# Patient Record
Sex: MALE | Race: WHITE | NOT HISPANIC OR LATINO | Employment: FULL TIME | URBAN - NONMETROPOLITAN AREA
[De-identification: names, ages, dates, MRNs, and addresses within clinical notes are randomized per-mention and may not be internally consistent; named-entity substitution may affect disease eponyms.]

---

## 2017-04-03 ENCOUNTER — OFFICE VISIT (OUTPATIENT)
Dept: URGENT CARE | Facility: PHYSICIAN GROUP | Age: 47
End: 2017-04-03
Payer: MEDICAID

## 2017-04-03 VITALS
HEART RATE: 88 BPM | HEIGHT: 72 IN | WEIGHT: 228 LBS | RESPIRATION RATE: 20 BRPM | TEMPERATURE: 98.8 F | SYSTOLIC BLOOD PRESSURE: 98 MMHG | BODY MASS INDEX: 30.88 KG/M2 | OXYGEN SATURATION: 93 % | DIASTOLIC BLOOD PRESSURE: 54 MMHG

## 2017-04-03 DIAGNOSIS — R06.02 SOB (SHORTNESS OF BREATH): ICD-10-CM

## 2017-04-03 DIAGNOSIS — J31.0 OTHER RHINITIS: ICD-10-CM

## 2017-04-03 DIAGNOSIS — J44.1 COPD EXACERBATION (HCC): ICD-10-CM

## 2017-04-03 DIAGNOSIS — J06.9 URI WITH COUGH AND CONGESTION: ICD-10-CM

## 2017-04-03 PROCEDURE — 99214 OFFICE O/P EST MOD 30 MIN: CPT | Performed by: PHYSICIAN ASSISTANT

## 2017-04-03 RX ORDER — DOXYCYCLINE HYCLATE 100 MG
100 TABLET ORAL 2 TIMES DAILY
Qty: 20 TAB | Refills: 0 | Status: SHIPPED | OUTPATIENT
Start: 2017-04-03 | End: 2017-05-09

## 2017-04-03 RX ORDER — METHYLPREDNISOLONE 4 MG/1
TABLET ORAL
Qty: 21 TAB | Refills: 0 | Status: SHIPPED | OUTPATIENT
Start: 2017-04-03 | End: 2017-05-22

## 2017-04-03 RX ORDER — ALBUTEROL SULFATE 2.5 MG/3ML
2.5 SOLUTION RESPIRATORY (INHALATION) EVERY 4 HOURS PRN
Qty: 75 ML | Refills: 0 | Status: SHIPPED | OUTPATIENT
Start: 2017-04-03 | End: 2017-05-09

## 2017-04-03 RX ORDER — CODEINE PHOSPHATE AND GUAIFENESIN 10; 100 MG/5ML; MG/5ML
5 SOLUTION ORAL NIGHTLY PRN
Qty: 120 ML | Refills: 0 | Status: SHIPPED | OUTPATIENT
Start: 2017-04-03 | End: 2017-05-09

## 2017-04-03 NOTE — MR AVS SNAPSHOT
"        Dayton Luna Sonya   4/3/2017 11:25 AM   Office Visit   MRN: [id number]    Department:  [facility]   Dept Phone:  [phone]    Description:  Male : 1970   Provider:  Maria Fernanda Gandara PA-C           Reason for Visit     Sore Throat fever, headache, cough, congestion X 1 month      Allergies as of 4/3/2017     No Known Allergies      You were diagnosed with     URI with cough and congestion   [5150760]       COPD exacerbation (CMS-[facility])   [[id number]]       SOB (shortness of breath)   [236603]         Vital Signs     Blood Pressure Pulse Temperature Respirations Height Weight    98/54 mmHg 88 37.1 °C (98.8 °F) 20 1.829 m (6' 0.01\") 103.42 kg (228 lb)    Body Mass Index Oxygen Saturation Smoking Status             30.92 kg/m2 93% Current Every Day Smoker         Basic Information     Date Of Birth Sex Race Ethnicity Preferred Language    1970 Male White Non- English      Health Maintenance        Date Due Completion Dates    IMM DTaP/Tdap/Td Vaccine (1 - Tdap) 1987 ---            Current Immunizations     No immunizations on file.      Below and/or attached are the medications your provider expects you to take. Review all of your home medications and newly ordered medications with your provider and/or pharmacist. Follow medication instructions as directed by your provider and/or pharmacist. Please keep your medication list with you and share with your provider. Update the information when medications are discontinued, doses are changed, or new medications (including over-the-counter products) are added; and carry medication information at all times in the event of emergency situations     Allergies:  No Known Allergies          Medications  Valid as of: 2017 - 12:01 PM    Generic Name Brand Name Tablet Size Instructions for use    Albuterol Sulfate (Nebu Soln) PROVENTIL 2.5mg/3ml 3 mL by Nebulization route every four hours as needed for Shortness of Breath.       " Albuterol Sulfate (Aero Soln) albuterol 108 (90 BASE) MCG/ACT Inhale 2 Puffs by mouth every four hours as needed for Shortness of Breath.        Albuterol Sulfate (Nebu Soln) PROVENTIL 2.5mg/3ml 3 mL by Nebulization route every four hours as needed for Shortness of Breath.        Albuterol Sulfate (Aero Soln) albuterol 108 (90 BASE) MCG/ACT Inhale 2 Puffs by mouth every 6 hours as needed for Shortness of Breath.        Albuterol Sulfate (Nebu Soln) PROVENTIL 2.5mg/3ml 3 mL by Nebulization route every four hours as needed for Shortness of Breath.        Doxycycline Hyclate (Tab) VIBRAMYCIN 100 MG Take 1 Tab by mouth 2 times a day.        Fluticasone Propionate (Suspension) FLONASE 50 MCG/ACT Spray 1 Spray in nose 2 times a day.        Guaifenesin-Codeine (Solution) ROBITUSSIN -10 mg/5mL Take 5 mL by mouth at bedtime as needed for Cough.        Guaifenesin-Codeine (Solution) ROBITUSSIN -10 mg/5mL Take 5 mL by mouth at bedtime as needed for Cough.        Ipratropium Bromide (Solution) ATROVENT 0.02 % 1 mL by Nebulization route 2 times a day.        MethylPREDNISolone (Tablet Therapy Pack) MEDROL DOSEPAK 4 MG Use as package directs        .                 Medicines prescribed today were sent to:     [facility] [id number] [place] [address] [address] [place] [address] AT [facility] [address]    [address] [facility] [address]    Phone: [phone] Fax: [phone]    Open 24 Hours?: No      Medication refill instructions:       If your prescription bottle indicates you have medication refills left, it is not necessary to call your provider’s office. Please contact your pharmacy and they will refill your medication.    If your prescription bottle indicates you do not have any refills left, you may request refills at any time through one of the following ways: The online [employer] system (except Urgent Care), by calling your provider’s office, or by asking your pharmacy to contact your provider’s  office with a refill request. Medication refills are processed only during regular business hours and may not be available until the next business day. Your provider may request additional information or to have a follow-up visit with you prior to refilling your medication.   *Please Note: Medication refills are assigned a new Rx number when refilled electronically. Your pharmacy may indicate that no refills were authorized even though a new prescription for the same medication is available at the pharmacy. Please request the medicine by name with the pharmacy before contacting your provider for a refill.        Instructions    Call [facility]  [phone]          IT MOVES IT Access Code: [email or web address]  Expires: 5/3/2017 12:01 PM    Your email address is not on file at [facility].  Email Addresses are required for you to sign up for IT MOVES IT, please contact [phone] to verify your personal information and to provide your email address prior to attempting to register for IT MOVES IT.    [place] C[place] [address] [address]    IT MOVES IT  A secure, online tool to manage your health information     [facility]’s IT MOVES IT® is a secure, online tool that connects you to your personalized health information from the privacy of your home -- day or night - making it very easy for you to manage your healthcare. Once the activation process is completed, you can even access your medical information using the IT MOVES IT chris, which is available for free in the Apple Chris store or Google Play store.     To learn more about IT MOVES IT, visit [email or web address]    There are two levels of access available (as shown below):   My Chart Features  [facility] Primary Care Doctor [facility]  Specialists [facility]  Urgent  Care Non-[facility] Primary Care Doctor   Email your healthcare team securely and privately 24/7 X X X    Manage appointments: schedule your next appointment; view details of past/upcoming  appointments X      Request prescription refills. X      View recent personal medical records, including lab and immunizations X X X X   View health record, including health history, allergies, medications X X X X   Read reports about your outpatient visits, procedures, consult and ER notes X X X X   See your discharge summary, which is a recap of your hospital and/or ER visit that includes your diagnosis, lab results, and care plan X X  X     How to register for [employer]:  Once your e-mail address has been verified, follow the following steps to sign up for [employer].     1. Go to  [email or web address]  2. Click on the Sign Up Now box, which takes you to the New Member Sign Up page. You will need to provide the following information:  a. Enter your [employer] Access Code exactly as it appears at the top of this page. (You will not need to use this code after you’ve completed the sign-up process. If you do not sign up before the expiration date, you must request a new code.)   b. Enter your date of birth.   c. Enter your home email address.   d. Click Submit, and follow the next screen’s instructions.  3. Create a [employer] ID. This will be your [employer] login ID and cannot be changed, so think of one that is secure and easy to remember.  4. Create a [employer] password. You can change your password at any time.  5. Enter your Password Reset Question and Answer. This can be used at a later time if you forget your password.   6. Enter your e-mail address. This allows you to receive e-mail notifications when new information is available in [employer].  7. Click Sign Up. You can now view your health information.    For assistance activating your [employer] account, call [phone]         Quit Tobacco Information     Do you want to quit using tobacco?    Quitting tobacco decreases risks of cancer, heart and lung disease, increases life expectancy, improves sense of taste and smell, and increases spending money, among other  benefits.    If you are thinking about quitting, we can help.  • [facility] Quit Tobacco Program: 876-845-5996  o Program occurs weekly for four weeks and includes pharmacist consultation on products to support quitting smoking or chewing tobacco. A provider referral is needed for pharmacist consultation.  • Tobacco Users Help Hotline: 1-800-QUIT-NOW (262-4951) or https://[place].quitlogix.org/  o Free, confidential telephone and online coaching for [place] residents. Sessions are designed on a schedule that is convenient for you. Eligible clients receive free nicotine replacement therapy.  • Nationally: www.smokefree.gov  o Information and professional assistance to support both immediate and long-term needs as you become, and remain, a non-smoker. Smokefree.gov allows you to choose the help that best fits your needs.

## 2017-04-03 NOTE — PROGRESS NOTES
Chief Complaint   Patient presents with   • Sore Throat     fever, headache, cough, congestion X 1 month       HISTORY OF PRESENT ILLNESS: Patient is a 47 y.o. male who presents today for evaluation of a cough that started approximately one month ago. Patient reports persistent shortness of breath, green sputum production, green drainage from his nose. He reports a subjective fever. He has nebulizer machine at home but he is out of medication. He reports a history of COPD. He has had difficulty sleeping tonight because of a cough. He complains of associated headache. He has been using over-the-counter Flonase with minimal relief of his symptoms.    There are no active problems to display for this patient.      Allergies:Review of patient's allergies indicates no known allergies.    Current Outpatient Prescriptions Ordered in [facility]   Medication Sig Dispense Refill   • guaifenesin-codeine (ROBITUSSIN AC) Solution oral solution Take 5 mL by mouth at bedtime as needed for Cough. 120 mL 0   • albuterol (PROVENTIL) 2.5mg/3ml Nebu Soln solution for nebulization 3 mL by Nebulization route every four hours as needed for Shortness of Breath. 75 mL 0   • doxycycline (VIBRAMYCIN) 100 MG Tab Take 1 Tab by mouth 2 times a day. 20 Tab 0   • MethylPREDNISolone (MEDROL DOSEPAK) 4 MG Tablet Therapy Pack Use as package directs 21 Tab 0   • albuterol (PROVENTIL) 2.5mg/3ml Nebu Soln solution for nebulization 3 mL by Nebulization route every four hours as needed for Shortness of Breath. 75 mL 0   • fluticasone (FLONASE) 50 MCG/ACT nasal spray Spray 1 Spray in nose 2 times a day. 1 Bottle 0   • ipratropium (ATROVENT) 0.02 % Solution 1 mL by Nebulization route 2 times a day. 60 Vial 0   • guaifenesin-codeine (ROBITUSSIN AC) Solution oral solution Take 5 mL by mouth at bedtime as needed for Cough. 120 mL 0   • albuterol (VENTOLIN OR PROVENTIL) 108 (90 BASE) MCG/ACT Aero Soln inhalation aerosol Inhale 2 Puffs by mouth every 6 hours as needed  "for Shortness of Breath. 8.5 g 0   • albuterol (PROVENTIL) 2.5mg/3ml NEBU solution for nebulization 3 mL by Nebulization route every four hours as needed for Shortness of Breath. 75 mL 0   • albuterol (VENTOLIN OR PROVENTIL) 108 (90 BASE) MCG/ACT AERS inhalation aerosol Inhale 2 Puffs by mouth every four hours as needed for Shortness of Breath. 1 Inhaler 1     No current Epic-ordered facility-administered medications on file.       Past Medical History   Diagnosis Date   • Chronic airway obstruction, not elsewhere classified (CMS-HCC)    • Asthma        Social History   Substance Use Topics   • Smoking status: Current Every Day Smoker -- 0.50 packs/day   • Smokeless tobacco: None   • Alcohol Use: No       No family status information on file.   History reviewed. No pertinent family history.    ROS:   Review of Systems   Constitutional: Negative for weight loss and malaise/fatigue.   HENT: Negative for ear pain, nosebleeds, sore throat and neck pain.    Eyes: Negative for blurred vision.   Respiratory: Positive for cough, sputum production, shortness of breath and wheezing.    Cardiovascular: Negative for chest pain, palpitations, orthopnea and leg swelling.   Gastrointestinal: Negative for heartburn, nausea, vomiting and abdominal pain.   Genitourinary: Negative for dysuria, urgency and frequency.       Exam:  Blood pressure 98/54, pulse 88, temperature 37.1 °C (98.8 °F), resp. rate 20, height 1.829 m (6' 0.01\"), weight 103.42 kg (228 lb), SpO2 93 %.  General: Normal appearing. No distress.  HEENT: Conjunctiva clear, lids without ptosis, ears normal shape and contour, canals are clear bilaterally, tympanic membranes are benign, nasal mucosa edematous bilaterally, oropharynx is without erythema, edema or exudates.  Pulmonary: Clear to ausculation and percussion.  Normal effort. No rales, ronchi, or wheezing.   Cardiovascular: Regular rate and rhythm without murmur.   Neurologic: Grossly nonfocal.  Lymph: No cervical " lymphadenopathy noted.  Skin: No obvious lesions.  Psych: Normal mood. Alert and oriented x3. Judgment and insight is normal.    Declines nebulizer treatment    Assessment/Plan:  Take all medication as directed. Discussed appropriate over-the-counter symptomatic medication, and when to return to clinic. Follow-up for worsening or persistent symptoms. Establish and follow up with a primary care provider.  1. URI with cough and congestion  guaifenesin-codeine (ROBITUSSIN AC) Solution oral solution    doxycycline (VIBRAMYCIN) 100 MG Tab   2. COPD exacerbation (CMS-[facility])  albuterol (PROVENTIL) 2.5mg/3ml Nebu Soln solution for nebulization    MethylPREDNISolone (MEDROL DOSEPAK) 4 MG Tablet Therapy Pack   3. SOB (shortness of breath)  albuterol (PROVENTIL) 2.5mg/3ml Nebu Soln solution for nebulization    MethylPREDNISolone (MEDROL DOSEPAK) 4 MG Tablet Therapy Pack   4. Other rhinitis

## 2017-05-09 ENCOUNTER — OFFICE VISIT (OUTPATIENT)
Dept: MEDICAL GROUP | Facility: CLINIC | Age: 47
End: 2017-05-09
Payer: MEDICAID

## 2017-05-09 VITALS
SYSTOLIC BLOOD PRESSURE: 108 MMHG | BODY MASS INDEX: 29.52 KG/M2 | RESPIRATION RATE: 20 BRPM | HEIGHT: 74 IN | TEMPERATURE: 98.6 F | HEART RATE: 114 BPM | DIASTOLIC BLOOD PRESSURE: 64 MMHG | WEIGHT: 230 LBS | OXYGEN SATURATION: 92 %

## 2017-05-09 DIAGNOSIS — R06.02 SOB (SHORTNESS OF BREATH): ICD-10-CM

## 2017-05-09 DIAGNOSIS — J45.909 UNCOMPLICATED ASTHMA, UNSPECIFIED ASTHMA SEVERITY: ICD-10-CM

## 2017-05-09 DIAGNOSIS — M21.941: ICD-10-CM

## 2017-05-09 DIAGNOSIS — Z72.0 TOBACCO ABUSE: ICD-10-CM

## 2017-05-09 DIAGNOSIS — Z12.11 SPECIAL SCREENING FOR MALIGNANT NEOPLASM OF COLON: ICD-10-CM

## 2017-05-09 PROBLEM — M21.949 HAND DEFORMITIES: Status: ACTIVE | Noted: 2017-05-09

## 2017-05-09 PROCEDURE — 99213 OFFICE O/P EST LOW 20 MIN: CPT | Performed by: PHYSICIAN ASSISTANT

## 2017-05-09 PROCEDURE — 99406 BEHAV CHNG SMOKING 3-10 MIN: CPT | Performed by: PHYSICIAN ASSISTANT

## 2017-05-09 RX ORDER — NICOTINE 21-14-7MG
1 KIT TRANSDERMAL DAILY
Qty: 2 KIT | Refills: 0 | Status: SHIPPED | OUTPATIENT
Start: 2017-05-09 | End: 2018-01-08

## 2017-05-09 RX ORDER — BUPROPION HYDROCHLORIDE 100 MG/1
TABLET ORAL
Qty: 30 TAB | Refills: 2 | Status: SHIPPED | OUTPATIENT
Start: 2017-05-09 | End: 2017-05-22 | Stop reason: SDUPTHER

## 2017-05-09 ASSESSMENT — PAIN SCALES - GENERAL: PAINLEVEL: 10=SEVERE PAIN

## 2017-05-09 ASSESSMENT — PATIENT HEALTH QUESTIONNAIRE - PHQ9: CLINICAL INTERPRETATION OF PHQ2 SCORE: 0

## 2017-05-09 NOTE — ASSESSMENT & PLAN NOTE
Patient is currently using his albuterol inhaler 10 or more times daily when he does not have access to his albuterol nebulizer which he is using 5 times daily if he has access and isn't working. He is coughing and waking himself up multiple times per night. He has trouble breathing such that he is finding it diffcult to work. He continues to smoke.
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
## 2017-05-09 NOTE — ASSESSMENT & PLAN NOTE
Patient has been smoking a lot for a very long time but is ready to quit due to his declining breathing. He would like help with this today.

## 2017-05-09 NOTE — ASSESSMENT & PLAN NOTE
The area on his palm above his tendons of his ring and middle fingers of his right hand are hardened and somewhat contracted. He doesn't recall burning himself though he does use this as his dominant hand.

## 2017-05-09 NOTE — MR AVS SNAPSHOT
"Dayton Hassan   2017 3:20 PM   Office Visit   MRN: [id number]    Department:  [facility] Phone:  [phone]    Description:  Male : 1970   Provider:  Jaclyn Harrington PA-C           Reason for Visit     Other new to you    Medication Refill           Allergies as of 2017     No Known Allergies      You were diagnosed with     Tobacco abuse   [139493]       Uncomplicated asthma, unspecified asthma severity   [1701047]       Special screening for malignant neoplasm of colon   [886430]       Hand deformities, right   [[id number]]         Vital Signs     Blood Pressure Pulse Temperature Respirations Height Weight    108/64 mmHg 114 37 °C (98.6 °F) 20 1.88 m (6' 2.02\") 104.327 kg (230 lb)    Body Mass Index Oxygen Saturation Smoking Status             29.52 kg/m2 92% Current Every Day Smoker         Basic Information     Date Of Birth Sex Race Ethnicity Preferred Language    1970 Male White Non- English      Problem List              ICD-10-CM Priority Class Noted - Resolved    Uncomplicated asthma J45.909   2017 - Present    Tobacco abuse Z72.0   2017 - Present    Hand deformities M21.949   2017 - Present      Health Maintenance        Date Due Completion Dates    IMM DTaP/Tdap/Td Vaccine (1 - Tdap) 1987 ---            Current Immunizations     No immunizations on file.      Below and/or attached are the medications your provider expects you to take. Review all of your home medications and newly ordered medications with your provider and/or pharmacist. Follow medication instructions as directed by your provider and/or pharmacist. Please keep your medication list with you and share with your provider. Update the information when medications are discontinued, doses are changed, or new medications (including over-the-counter products) are added; and carry medication information at all times in the event of emergency situations     Allergies:  No Known " Allergies          Medications  Valid as of: May 09, 2017 -  4:00 PM    Generic Name Brand Name Tablet Size Instructions for use    Albuterol Sulfate (Aero Soln) albuterol 108 (90 BASE) MCG/ACT Inhale 2 Puffs by mouth every four hours as needed for Shortness of Breath.        Albuterol Sulfate (Nebu Soln) PROVENTIL 2.5mg/3ml 3 mL by Nebulization route every four hours as needed for Shortness of Breath.        BuPROPion HCl (Tab) WELLBUTRIN 100 MG 50mg PO BID for 2 weeks and then 100mg PO BID thereafter        Fluticasone Propionate (Suspension) FLONASE 50 MCG/ACT Spray 1 Spray in nose 2 times a day.        Ipratropium Bromide (Solution) ATROVENT 0.02 % 1 mL by Nebulization route 2 times a day.        MethylPREDNISolone (Tablet Therapy Pack) MEDROL DOSEPAK 4 MG Use as package directs        Nicotine (Kit) Nicotine 21-14-7 MG/24HR Apply 1 Patch to skin as directed every day.        .                 Medicines prescribed today were sent to:     [facility] - [address] [facility] [address]    Phone: [phone] Fax: [phone]    Open 24 Hours?: No      Medication refill instructions:       If your prescription bottle indicates you have medication refills left, it is not necessary to call your provider’s office. Please contact your pharmacy and they will refill your medication.    If your prescription bottle indicates you do not have any refills left, you may request refills at any time through one of the following ways: The online TrackIF system (except Urgent Care), by calling your provider’s office, or by asking your pharmacy to contact your provider’s office with a refill request. Medication refills are processed only during regular business hours and may not be available until the next business day. Your provider may request additional information or to have a follow-up visit with you prior to refilling your medication.   *Please Note:  Medication refills are assigned a new Rx number when refilled electronically. Your pharmacy may indicate that no refills were authorized even though a new prescription for the same medication is available at the pharmacy. Please request the medicine by name with the pharmacy before contacting your provider for a refill.        Your To Do List     Future Labs/Procedures Complete By Expires    CT-VIRTUAL COLONOSCOPY-SCREEN  As directed 5/9/2018    DX-HAND 3+ RIGHT  As directed 5/9/2018      Referral     A referral request has been sent to our patient care coordination department. Please allow 3-5 business days for us to process this request and contact you either by phone or mail. If you do not hear from us by the 5th business day, please call us at [phone].           Money On Mobile Access Code: [email or web address]  Expires: 6/8/2017  4:00 PM    Your email address is not on file at [facility].  Email Addresses are required for you to sign up for Money On Mobile, please contact [phone] to verify your personal information and to provide your email address prior to attempting to register for Money On Mobile.    Dayton Luna Sell  [address] [place] [address]    Money On Mobile  A secure, online tool to manage your health information     [facility]’s Money On Mobile® is a secure, online tool that connects you to your personalized health information from the privacy of your home -- day or night - making it very easy for you to manage your healthcare. Once the activation process is completed, you can even access your medical information using the Money On Mobile chris, which is available for free in the Apple Chris store or Google Play store.     To learn more about Money On Mobile, visit [email or web address]    There are two levels of access available (as shown below):   My Chart Features  [facility]  Specialists [facility]  Urgent  Care Non-[facility] Doctor   Email your healthcare team securely and privately 24/7 X X X     Manage appointments: schedule your next appointment; view details of past/upcoming appointments X      Request prescription refills. X      View recent personal medical records, including lab and immunizations X X X X   View health record, including health history, allergies, medications X X X X   Read reports about your outpatient visits, procedures, consult and ER notes X X X X   See your discharge summary, which is a recap of your hospital and/or ER visit that includes your diagnosis, lab results, and care plan X X  X     How to register for Responsys:  Once your e-mail address has been verified, follow the following steps to sign up for Responsys.     1. Go to  [email or web address]  2. Click on the Sign Up Now box, which takes you to the New Member Sign Up page. You will need to provide the following information:  a. Enter your Responsys Access Code exactly as it appears at the top of this page. (You will not need to use this code after you’ve completed the sign-up process. If you do not sign up before the expiration date, you must request a new code.)   b. Enter your date of birth.   c. Enter your home email address.   d. Click Submit, and follow the next screen’s instructions.  3. Create a Responsys ID. This will be your Responsys login ID and cannot be changed, so think of one that is secure and easy to remember.  4. Create a Responsys password. You can change your password at any time.  5. Enter your Password Reset Question and Answer. This can be used at a later time if you forget your password.   6. Enter your e-mail address. This allows you to receive e-mail notifications when new information is available in Responsys.  7. Click Sign Up. You can now view your health information.    For assistance activating your Responsys account, call [phone]         Quit Tobacco Information     Do you want to quit using tobacco?    Quitting tobacco decreases risks of cancer, heart and lung disease, increases life  expectancy, improves sense of taste and smell, and increases spending money, among other benefits.    If you are thinking about quitting, we can help.  • [facility] Quit Tobacco Program: 712.123.7731  o Program occurs weekly for four weeks and includes pharmacist consultation on products to support quitting smoking or chewing tobacco. A provider referral is needed for pharmacist consultation.  • Tobacco Users Help Hotline: 9-800-QUIT-NOW (558-4307) or https://[place].quitlogix.org/  o Free, confidential telephone and online coaching for [place] residents. Sessions are designed on a schedule that is convenient for you. Eligible clients receive free nicotine replacement therapy.  • Nationally: www.smokefree.gov  o Information and professional assistance to support both immediate and long-term needs as you become, and remain, a non-smoker. Smokefree.gov allows you to choose the help that best fits your needs.

## 2017-05-10 DIAGNOSIS — F17.200 SMOKING: ICD-10-CM

## 2017-05-10 DIAGNOSIS — J06.9 VIRAL UPPER RESPIRATORY TRACT INFECTION: ICD-10-CM

## 2017-05-10 RX ORDER — IPRATROPIUM BROMIDE AND ALBUTEROL SULFATE 2.5; .5 MG/3ML; MG/3ML
3 SOLUTION RESPIRATORY (INHALATION) EVERY 6 HOURS PRN
Qty: 60 VIAL | Refills: 3 | Status: SHIPPED | OUTPATIENT
Start: 2017-05-10 | End: 2017-12-28 | Stop reason: SDUPTHER

## 2017-05-10 RX ORDER — ALBUTEROL SULFATE 2.5 MG/3ML
2.5 SOLUTION RESPIRATORY (INHALATION) EVERY 4 HOURS PRN
Qty: 75 ML | Refills: 0 | OUTPATIENT
Start: 2017-05-10

## 2017-05-10 RX ORDER — ALBUTEROL SULFATE 90 UG/1
2 AEROSOL, METERED RESPIRATORY (INHALATION) EVERY 4 HOURS PRN
Qty: 1 INHALER | Refills: 3 | Status: SHIPPED | OUTPATIENT
Start: 2017-05-10 | End: 2017-08-28

## 2017-05-15 ENCOUNTER — APPOINTMENT (OUTPATIENT)
Dept: RADIOLOGY | Facility: IMAGING CENTER | Age: 47
End: 2017-05-15
Attending: PHYSICIAN ASSISTANT
Payer: MEDICAID

## 2017-05-15 ENCOUNTER — TELEPHONE (OUTPATIENT)
Dept: MEDICAL GROUP | Facility: PHYSICIAN GROUP | Age: 47
End: 2017-05-15

## 2017-05-15 ENCOUNTER — NON-PROVIDER VISIT (OUTPATIENT)
Dept: MEDICAL GROUP | Facility: CLINIC | Age: 47
End: 2017-05-15
Payer: MEDICAID

## 2017-05-15 ENCOUNTER — NON-PROVIDER VISIT (OUTPATIENT)
Dept: URGENT CARE | Facility: PHYSICIAN GROUP | Age: 47
End: 2017-05-15
Payer: MEDICAID

## 2017-05-15 DIAGNOSIS — M21.941: ICD-10-CM

## 2017-05-15 DIAGNOSIS — Z01.89 ROUTINE LAB DRAW: ICD-10-CM

## 2017-05-15 PROCEDURE — 99000 SPECIMEN HANDLING OFFICE-LAB: CPT | Performed by: PHYSICIAN ASSISTANT

## 2017-05-15 PROCEDURE — 36415 COLL VENOUS BLD VENIPUNCTURE: CPT | Performed by: PHYSICIAN ASSISTANT

## 2017-05-15 PROCEDURE — 73130 X-RAY EXAM OF HAND: CPT | Mod: RT | Performed by: EMERGENCY MEDICINE

## 2017-05-15 NOTE — TELEPHONE ENCOUNTER
----- Message from Jaclyn Harrington PA-C sent at 5/15/2017 10:13 AM PDT -----  I reviewed xray.  Aside from an old injury, everything is within normal limits and looks good. Return for follow up as scheduled.

## 2017-05-16 ENCOUNTER — TELEPHONE (OUTPATIENT)
Dept: MEDICAL GROUP | Facility: CLINIC | Age: 47
End: 2017-05-16

## 2017-05-16 DIAGNOSIS — Z13.6 SCREENING FOR CARDIOVASCULAR CONDITION: ICD-10-CM

## 2017-05-16 DIAGNOSIS — Z72.0 TOBACCO ABUSE: ICD-10-CM

## 2017-05-16 DIAGNOSIS — R53.83 LETHARGY: ICD-10-CM

## 2017-05-16 NOTE — TELEPHONE ENCOUNTER
Pt came into do his labs, but there were no lab orders. He is coming in on 5/22/17 to see you about xray results. Did you want him to get labs done?

## 2017-05-18 DIAGNOSIS — J45.50 UNCOMPLICATED SEVERE PERSISTENT ASTHMA: ICD-10-CM

## 2017-05-18 NOTE — TELEPHONE ENCOUNTER
1. Caller Name: pt.                                         Call Back Number: [phone] (home)         Patient approves a detailed voicemail message: N\A    2. SPECIFIC Action To Be Taken: Labs    5. Is appointment scheduled for requested order/referral: yes - 5/22/17    Patient informed they will receive a return phone call from the office ONLY if there are any questions before processing their request. Advised to call back if they haven't received a call from the referral department in 5 days.  Pt. Contacted office in regards to this. No labs in chart. Informed a call back once Frida ocampo

## 2017-05-19 ENCOUNTER — HOSPITAL ENCOUNTER (OUTPATIENT)
Facility: MEDICAL CENTER | Age: 47
End: 2017-05-19
Attending: PHYSICIAN ASSISTANT
Payer: MEDICAID

## 2017-05-19 ENCOUNTER — NON-PROVIDER VISIT (OUTPATIENT)
Dept: MEDICAL GROUP | Facility: CLINIC | Age: 47
End: 2017-05-19
Payer: MEDICAID

## 2017-05-19 DIAGNOSIS — Z13.6 SCREENING FOR CARDIOVASCULAR CONDITION: ICD-10-CM

## 2017-05-19 DIAGNOSIS — Z13.220 LIPID SCREENING: ICD-10-CM

## 2017-05-19 DIAGNOSIS — J45.909 UNCOMPLICATED ASTHMA, UNSPECIFIED ASTHMA SEVERITY: ICD-10-CM

## 2017-05-19 DIAGNOSIS — R53.83 LETHARGY: ICD-10-CM

## 2017-05-19 LAB
ALBUMIN SERPL BCP-MCNC: 4.3 G/DL (ref 3.2–4.9)
ALBUMIN/GLOB SERPL: 1.3 G/DL
ALP SERPL-CCNC: 66 U/L (ref 30–99)
ALT SERPL-CCNC: 30 U/L (ref 2–50)
ANION GAP SERPL CALC-SCNC: 3 MMOL/L (ref 0–11.9)
AST SERPL-CCNC: 21 U/L (ref 12–45)
BILIRUB SERPL-MCNC: 0.4 MG/DL (ref 0.1–1.5)
BUN SERPL-MCNC: 15 MG/DL (ref 8–22)
CALCIUM SERPL-MCNC: 9.3 MG/DL (ref 8.5–10.5)
CHLORIDE SERPL-SCNC: 108 MMOL/L (ref 96–112)
CHOLEST SERPL-MCNC: 213 MG/DL (ref 100–199)
CO2 SERPL-SCNC: 29 MMOL/L (ref 20–33)
CREAT SERPL-MCNC: 1.12 MG/DL (ref 0.5–1.4)
ERYTHROCYTE [DISTWIDTH] IN BLOOD BY AUTOMATED COUNT: 41.7 FL (ref 35.9–50)
GFR SERPL CREATININE-BSD FRML MDRD: >60 ML/MIN/1.73 M 2
GLOBULIN SER CALC-MCNC: 3.3 G/DL (ref 1.9–3.5)
GLUCOSE SERPL-MCNC: 91 MG/DL (ref 65–99)
HCT VFR BLD AUTO: 47.5 % (ref 42–52)
HDLC SERPL-MCNC: 27 MG/DL
HGB BLD-MCNC: 15.2 G/DL (ref 14–18)
LDLC SERPL CALC-MCNC: 147 MG/DL
MCH RBC QN AUTO: 28.5 PG (ref 27–33)
MCHC RBC AUTO-ENTMCNC: 32 G/DL (ref 33.7–35.3)
MCV RBC AUTO: 89 FL (ref 81.4–97.8)
PLATELET # BLD AUTO: 251 K/UL (ref 164–446)
PMV BLD AUTO: 11 FL (ref 9–12.9)
POTASSIUM SERPL-SCNC: 3.9 MMOL/L (ref 3.6–5.5)
PROT SERPL-MCNC: 7.6 G/DL (ref 6–8.2)
RBC # BLD AUTO: 5.34 M/UL (ref 4.7–6.1)
SODIUM SERPL-SCNC: 140 MMOL/L (ref 135–145)
TRIGL SERPL-MCNC: 196 MG/DL (ref 0–149)
WBC # BLD AUTO: 6.8 K/UL (ref 4.8–10.8)

## 2017-05-19 PROCEDURE — 36415 COLL VENOUS BLD VENIPUNCTURE: CPT | Performed by: NURSE PRACTITIONER

## 2017-05-19 PROCEDURE — 99000 SPECIMEN HANDLING OFFICE-LAB: CPT | Performed by: NURSE PRACTITIONER

## 2017-05-19 PROCEDURE — 85027 COMPLETE CBC AUTOMATED: CPT

## 2017-05-19 PROCEDURE — 80053 COMPREHEN METABOLIC PANEL: CPT

## 2017-05-19 PROCEDURE — 80061 LIPID PANEL: CPT

## 2017-05-22 ENCOUNTER — OFFICE VISIT (OUTPATIENT)
Dept: MEDICAL GROUP | Facility: CLINIC | Age: 47
End: 2017-05-22
Payer: MEDICAID

## 2017-05-22 VITALS
BODY MASS INDEX: 29.52 KG/M2 | HEIGHT: 74 IN | TEMPERATURE: 96.3 F | DIASTOLIC BLOOD PRESSURE: 72 MMHG | SYSTOLIC BLOOD PRESSURE: 110 MMHG | OXYGEN SATURATION: 92 % | HEART RATE: 94 BPM | WEIGHT: 230 LBS

## 2017-05-22 DIAGNOSIS — J30.89 ENVIRONMENTAL AND SEASONAL ALLERGIES: ICD-10-CM

## 2017-05-22 DIAGNOSIS — Z72.0 TOBACCO ABUSE: ICD-10-CM

## 2017-05-22 DIAGNOSIS — E78.2 MIXED HYPERLIPIDEMIA: ICD-10-CM

## 2017-05-22 DIAGNOSIS — M72.0 DUPUYTREN'S CONTRACTURE OF RIGHT HAND: ICD-10-CM

## 2017-05-22 DIAGNOSIS — J45.50 SEVERE PERSISTENT ASTHMA, UNCOMPLICATED: ICD-10-CM

## 2017-05-22 PROBLEM — M21.949 HAND DEFORMITIES: Status: RESOLVED | Noted: 2017-05-09 | Resolved: 2017-05-22

## 2017-05-22 PROCEDURE — 99214 OFFICE O/P EST MOD 30 MIN: CPT | Performed by: PHYSICIAN ASSISTANT

## 2017-05-22 RX ORDER — CETIRIZINE HYDROCHLORIDE 10 MG/1
10 TABLET ORAL DAILY
Qty: 30 TAB | Refills: 2 | Status: SHIPPED | OUTPATIENT
Start: 2017-05-22 | End: 2018-01-08 | Stop reason: SDUPTHER

## 2017-05-22 RX ORDER — BUPROPION HYDROCHLORIDE 100 MG/1
TABLET ORAL
Qty: 60 TAB | Refills: 2 | Status: SHIPPED | OUTPATIENT
Start: 2017-05-22 | End: 2017-05-22 | Stop reason: SDUPTHER

## 2017-05-22 RX ORDER — BUPROPION HYDROCHLORIDE 100 MG/1
100 TABLET ORAL 2 TIMES DAILY
Qty: 60 TAB | Refills: 2 | Status: SHIPPED | OUTPATIENT
Start: 2017-05-22 | End: 2018-01-08 | Stop reason: SDUPTHER

## 2017-05-22 RX ORDER — LOVASTATIN 10 MG/1
20 TABLET ORAL
Qty: 30 TAB | Refills: 1 | Status: SHIPPED | OUTPATIENT
Start: 2017-05-22 | End: 2017-07-17 | Stop reason: SDUPTHER

## 2017-05-22 NOTE — ASSESSMENT & PLAN NOTE
Patient states his father has hyperlipidemia and that he has a personal history of hyperlipidemia but does not recall which statin medication he was treated with. While in snf he was taking fish oil pills but has not taken anything for this in quite some time.

## 2017-05-22 NOTE — ASSESSMENT & PLAN NOTE
Patient has cut down from 1 ppd to 5 cigarettes per day while using the nicotine patch and wellbutrin over the past 2 weeks. He would like to stick with 21mg patches for another 2 weeks before starting the 14 mg patches.

## 2017-05-22 NOTE — PROGRESS NOTES
Chief Complaint   Patient presents with   • Follow-Up     x-ray right hand, labs   • Nasal Congestion     past 1 week,        HISTORY OF PRESENT ILLNESS: Patient is a 47 y.o. male established patient who presents today for evaluation and management of:    Dupuytren's contracture of right hand  Patient noticed this bunching of skin around the palm of his right hand a couple of months ago. He states it is itchy and occasionally painful.     Tobacco abuse  Patient has cut down from 1 ppd to 5 cigarettes per day while using the nicotine patch and wellbutrin over the past 2 weeks. He would like to stick with 21mg patches for another 2 weeks before starting the 14 mg patches.     Environmental and seasonal allergies  Patient has noted increased rhinorrhea with sneezing and postnasal drip. He states he has increased his exposure to hay recently. He has been trying to use his Flonase nasal spray but is unable to get the medication to stay in his nose.    Mixed hyperlipidemia  Patient states his father has hyperlipidemia and that he has a personal history of hyperlipidemia but does not recall which statin medication he was treated with. While in long term he was taking fish oil pills but has not taken anything for this in quite some time.    Severe persistent asthma, uncomplicated  Patient states that using Advair daily increase his cough so he stopped taking it.          Patient Active Problem List    Diagnosis Date Noted   • Environmental and seasonal allergies 05/22/2017   • Mixed hyperlipidemia 05/22/2017   • Dupuytren's contracture of right hand 05/22/2017   • Severe persistent asthma, uncomplicated 05/09/2017   • Tobacco abuse 05/09/2017       Allergies:Review of patient's allergies indicates no known allergies.    Current Outpatient Prescriptions   Medication Sig Dispense Refill   • cetirizine (ZYRTEC) 10 MG Tab Take 1 Tab by mouth every day. 30 Tab 2   • lovastatin (MEVACOR) 10 MG tablet Take 2 Tabs by mouth every  bedtime. 30 Tab 1   • buPROPion (WELLBUTRIN) 100 MG Tab Take 1 Tab by mouth 2 times a day. 50mg PO BID for 2 weeks and then 100mg PO BID thereafter 60 Tab 2   • ipratropium-albuterol (DUONEB) 0.5-2.5 (3) MG/3ML nebulizer solution 3 mL by Nebulization route every 6 hours as needed for Shortness of Breath. 60 Vial 3   • albuterol 108 (90 BASE) MCG/ACT Aero Soln inhalation aerosol Inhale 2 Puffs by mouth every four hours as needed for Shortness of Breath. 1 Inhaler 3   • Nicotine 21-14-7 MG/24HR Kit Apply 1 Patch to skin as directed every day. 2 Kit 0   • fluticasone-salmeterol (ADVAIR) 100-50 MCG/DOSE AEROSOL POWDER, BREATH ACTIVATED Inhale 1 Puff by mouth every 12 hours. 1 Inhaler 2   • ipratropium (ATROVENT) 0.02 % Solution 1 mL by Nebulization route 2 times a day. 60 Vial 0   • fluticasone (FLONASE) 50 MCG/ACT nasal spray Spray 1 Spray in nose 2 times a day. 1 Bottle 0     No current facility-administered medications for this visit.       Social History   Substance Use Topics   • Smoking status: Current Every Day Smoker -- 1.00 packs/day for 40 years     Types: Cigarettes   • Smokeless tobacco: Never Used      Comment: now smoking 5 cigarettes daily working on quitting   • Alcohol Use: No       No family status information on file.     Family History   Problem Relation Age of Onset   • Lung Cancer Father    • Diabetes Father    • Diabetes Maternal Grandfather    • Diabetes Paternal Grandfather    • Diabetes Paternal Grandmother    • Diabetes Maternal Grandmother    • Cancer Father      bone   • Hyperlipidemia Father    • Asthma Mother    • Other Mother      varicose veins       Review of Systems:   Constitutional: Negative for fever, chills, weight loss and malaise/fatigue.   HENT: See HPI above. Negative for ear pain, nosebleeds, sore throat and neck pain.    Respiratory: Positive for continued cough, sputum production, shortness of breath  and wheezing.    Cardiovascular: Negative for chest pain, palpitations and  "leg swelling.   Musculoskeletal: Positive for right hand pain.   Skin: Negative for rash. Positive for bunching of skin and itchiness on right hand  Neurological: Negative for dizziness, tingling, tremors, sensory change, focal weakness and headaches.   Endo/Heme/Allergies: See allergies in HPI above. Does not bruise/bleed easily.   Psychiatric/Behavioral: Patient is doing well at quitting smoking. He is not suicidal or homicidal and does not have insomnia.     Exam:  Blood pressure 110/72, pulse 94, temperature 35.7 °C (96.3 °F), height 1.88 m (6' 2.02\"), weight 104.327 kg (230 lb), SpO2 92 %.  Body mass index is 29.52 kg/(m^2).  General:  Tripoding, well developed, well nourished male who is short of breath at rest.  Head: is grossly normal.  Neck: Supple without masses. Thyroid is not visibly enlarged.  Pulmonary: Wheeze with crackles auscultated in all lung fields bilaterally. Shortness of breath at rest. Patient is tripoding and using accessory muscles to breathe.  Cardiovascular: Faintly audible over wheezes. Regular rate and rhythm without murmur.   Extremities: Clubbing of bilateral fingernails. No  cyanosis, or edema.    Medical decision-making and discussion:  1. Environmental and seasonal allergies  Patient is not currently able to use Flonase due to increase in rhinorrhea.  - cetirizine (ZYRTEC) 10 MG Tab; Take 1 Tab by mouth every day.  Dispense: 30 Tab; Refill: 2    2. Tobacco abuse  Patient is doing well at quitting tobacco use by using nicotine patches. He has started the second week of 21 mg patches. He'll begin 14 mg patches in 2 weeks for one month followed by 7 mg patches for one month.  - buPROPion (WELLBUTRIN) 100 MG Tab; 50mg PO BID for 2 weeks and then 100mg PO BID thereafter  Dispense: 60 Tab; Refill: 2    3. Mixed hyperlipidemia  Patient states his father has this issue to him that he has not been able to modify his cholesterol by diet alone. Patient states he does have a sweet tooth and " has been advised to attempt to curb this.  - lovastatin (MEVACOR) 10 MG tablet; Take 2 Tabs by mouth every bedtime.  Dispense: 30 Tab; Refill: 1  - LIPID PANEL    4. Dupuytren's contracture of right hand  Dr. Cortez was consulted regarding this matter and referral to hand surgery was decided to be the best option to alleviate the patient's symptoms.  - REFERRAL TO HAND SURGERY    5. Severe persistent asthma, uncomplicated  He has a current referral for pulmonology and will be seen next week.      Please note that this dictation was created using voice recognition software. I have made every reasonable attempt to correct obvious errors, but I expect that there are errors of grammar and possibly content that I did not discover before finalizing the note.      Return in about 6 weeks (around 7/3/2017) for hld.

## 2017-05-22 NOTE — ASSESSMENT & PLAN NOTE
Patient has noted increased rhinorrhea with sneezing and postnasal drip. He states he has increased his exposure to hay recently. He has been trying to use his Flonase nasal spray but is unable to get the medication to stay in his nose.

## 2017-05-22 NOTE — ASSESSMENT & PLAN NOTE
Patient noticed this bunching of skin around the palm of his right hand a couple of months ago. He states it is itchy and occasionally painful.

## 2017-05-24 NOTE — PROGRESS NOTES
Quick Note:    I reviewed colonoscopy screening. You have some swelling in your colon that is not currently concerning for cancer but should be watched closely. There is no need to follow up at this time but we should discuss this at your next visit.  ______

## 2017-05-26 ENCOUNTER — TELEPHONE (OUTPATIENT)
Dept: MEDICAL GROUP | Facility: CLINIC | Age: 47
End: 2017-05-26

## 2017-05-26 NOTE — TELEPHONE ENCOUNTER
----- Message from Jaclyn Harrington PA-C sent at 5/24/2017 10:19 AM PDT -----  I reviewed colonoscopy screening. You have some swelling in your colon that is not currently concerning for cancer but should be watched closely. There is no need to follow up at this time but we should discuss this at your next visit.

## 2017-07-17 DIAGNOSIS — E78.2 MIXED HYPERLIPIDEMIA: ICD-10-CM

## 2017-07-18 RX ORDER — LOVASTATIN 10 MG/1
20 TABLET ORAL
Qty: 30 TAB | Refills: 1 | Status: SHIPPED | OUTPATIENT
Start: 2017-07-18 | End: 2018-01-08 | Stop reason: SDUPTHER

## 2017-08-28 ENCOUNTER — OFFICE VISIT (OUTPATIENT)
Dept: URGENT CARE | Facility: PHYSICIAN GROUP | Age: 47
End: 2017-08-28
Payer: MEDICAID

## 2017-08-28 VITALS
OXYGEN SATURATION: 93 % | RESPIRATION RATE: 16 BRPM | TEMPERATURE: 98.5 F | WEIGHT: 237 LBS | DIASTOLIC BLOOD PRESSURE: 62 MMHG | BODY MASS INDEX: 30.42 KG/M2 | HEART RATE: 105 BPM | SYSTOLIC BLOOD PRESSURE: 120 MMHG | HEIGHT: 74 IN

## 2017-08-28 DIAGNOSIS — J44.1 COPD EXACERBATION (HCC): ICD-10-CM

## 2017-08-28 PROCEDURE — 99214 OFFICE O/P EST MOD 30 MIN: CPT | Performed by: PHYSICIAN ASSISTANT

## 2017-08-28 RX ORDER — ALBUTEROL SULFATE 90 UG/1
2 AEROSOL, METERED RESPIRATORY (INHALATION) EVERY 6 HOURS PRN
Qty: 8.5 G | Refills: 1 | Status: SHIPPED | OUTPATIENT
Start: 2017-08-28 | End: 2018-01-08 | Stop reason: SDUPTHER

## 2017-08-28 RX ORDER — AZITHROMYCIN 250 MG/1
TABLET, FILM COATED ORAL
Qty: 6 TAB | Refills: 0 | Status: SHIPPED | OUTPATIENT
Start: 2017-08-28 | End: 2018-01-08

## 2017-08-28 ASSESSMENT — ENCOUNTER SYMPTOMS
WHEEZING: 1
HEMOPTYSIS: 0
SHORTNESS OF BREATH: 1
SORE THROAT: 0
FEVER: 1
RHINORRHEA: 1
COUGH: 1
SPUTUM PRODUCTION: 1

## 2017-08-28 ASSESSMENT — COPD QUESTIONNAIRES: COPD: 1

## 2017-08-28 NOTE — PROGRESS NOTES
Subjective:      Dayton Hassan is a 47 y.o. male who presents with Cough (x1wk chest congestion, L eye redness, itching, pain)            Shortness of breath, wheezing, purulent sputum, intermittent fevers for about one week. Not improving. History of COPD and asthma. He would like antibiotics and refill of albuterol MDI.      Cough   This is a recurrent problem. The current episode started in the past 7 days. The problem has been waxing and waning. The cough is productive of sputum. Associated symptoms include a fever, rhinorrhea, shortness of breath and wheezing. Pertinent negatives include no chest pain, ear pain, hemoptysis or sore throat. Nothing aggravates the symptoms. He has tried a beta-agonist inhaler for the symptoms. The treatment provided mild relief. His past medical history is significant for asthma and COPD.       Review of Systems   Constitutional: Positive for fever.   HENT: Positive for congestion and rhinorrhea. Negative for ear pain and sore throat.    Respiratory: Positive for cough, sputum production, shortness of breath and wheezing. Negative for hemoptysis.    Cardiovascular: Negative for chest pain.     Allergies:Review of patient's allergies indicates no known allergies.    Current Outpatient Prescriptions Ordered in [facility]   Medication Sig Dispense Refill   • azithromycin (ZITHROMAX) 250 MG Tab Take 2 tablets on day one, then take one tablet daily for 4 days 6 Tab 0   • albuterol 108 (90 Base) MCG/ACT Aero Soln inhalation aerosol Inhale 2 Puffs by mouth every 6 hours as needed for Shortness of Breath. 8.5 g 1   • lovastatin (MEVACOR) 10 MG tablet Take 2 Tabs by mouth every bedtime. 30 Tab 1   • cetirizine (ZYRTEC) 10 MG Tab Take 1 Tab by mouth every day. 30 Tab 2   • buPROPion (WELLBUTRIN) 100 MG Tab Take 1 Tab by mouth 2 times a day. 50mg PO BID for 2 weeks and then 100mg PO BID thereafter 60 Tab 2   • fluticasone-salmeterol (ADVAIR) 100-50 MCG/DOSE AEROSOL POWDER, BREATH ACTIVATED  "Inhale 1 Puff by mouth every 12 hours. 1 Inhaler 2   • ipratropium-albuterol (DUONEB) 0.5-2.5 (3) MG/3ML nebulizer solution 3 mL by Nebulization route every 6 hours as needed for Shortness of Breath. 60 Vial 3   • Nicotine 21-14-7 MG/24HR Kit Apply 1 Patch to skin as directed every day. 2 Kit 0   • fluticasone (FLONASE) 50 MCG/ACT nasal spray Spray 1 Spray in nose 2 times a day. 1 Bottle 0   • ipratropium (ATROVENT) 0.02 % Solution 1 mL by Nebulization route 2 times a day. 60 Vial 0     No current Epic-ordered facility-administered medications on file.        Past Medical History:   Diagnosis Date   • Asthma    • Chronic airway obstruction, not elsewhere classified        Social History   Substance Use Topics   • Smoking status: Current Every Day Smoker     Packs/day: 1.00     Years: 40.00     Types: Cigarettes   • Smokeless tobacco: Never Used      Comment: now smoking 5 cigarettes daily working on quitting   • Alcohol use No       Family Status   Relation Status   • Father    • Maternal Grandfather    • Paternal Grandfather    • Paternal Grandmother    • Maternal Grandmother    • Mother      Family History   Problem Relation Age of Onset   • Lung Cancer Father    • Diabetes Father    • Cancer Father      bone   • Hyperlipidemia Father    • Diabetes Maternal Grandfather    • Diabetes Paternal Grandfather    • Diabetes Paternal Grandmother    • Diabetes Maternal Grandmother    • Asthma Mother    • Other Mother      varicose veins          Objective:     /62   Pulse (!) 105   Temp 36.9 °C (98.5 °F)   Resp 16   Ht 1.88 m (6' 2.02\")   Wt 107.5 kg (237 lb)   SpO2 93%   BMI 30.42 kg/m²      Physical Exam   Constitutional: He is oriented to person, place, and time. He appears well-developed and well-nourished. No distress.   Appears uncomfortable   HENT:   Head: Normocephalic and atraumatic.   Right Ear: External ear normal.   Left Ear: External ear normal.   Mouth/Throat: Oropharynx is clear and moist. "   Eyes: Conjunctivae are normal. Right eye exhibits no discharge. Left eye exhibits no discharge.   Neck: Normal range of motion. Neck supple.   Cardiovascular: Regular rhythm.    Tachycardic    Pulmonary/Chest: Effort normal. He has no wheezes. He has no rales.   Breath sounds mildly diminished throughout. Frequent deep cough   Neurological: He is alert and oriented to person, place, and time.   Skin: Skin is warm and dry. He is not diaphoretic.   Psychiatric: He has a normal mood and affect. His behavior is normal. Judgment and thought content normal.   Nursing note and vitals reviewed.              Assessment/Plan:     1. COPD exacerbation ([facility])      Likely exacerbated by recent URI. Shortness of breath, wheezing, purulent sputum. Start azithromycin. Refilled albuterol. Follow-up with PCP.       [facility] Patient Education given: COPD exacerbation, smoking cessation    Please note that this dictation was created using voice recognition software. I have made every reasonable attempt to correct obvious errors, but I expect that there are errors of grammar and possibly content that I did not discover before finalizing the note.

## 2017-08-28 NOTE — PATIENT INSTRUCTIONS
Chronic Obstructive Pulmonary Disease Exacerbation  Chronic obstructive pulmonary disease (COPD) is a common lung condition in which airflow from the lungs is limited. COPD is a general term that can be used to describe many different lung problems that limit airflow, including chronic bronchitis and emphysema. COPD exacerbations are episodes when breathing symptoms become much worse and require extra treatment. Without treatment, COPD exacerbations can be life threatening, and frequent COPD exacerbations can cause further damage to your lungs.  CAUSES  · Respiratory infections.  · Exposure to smoke.  · Exposure to air pollution, chemical fumes, or dust.  Sometimes there is no apparent cause or trigger.  RISK FACTORS  · Smoking cigarettes.  · Older age.  · Frequent prior COPD exacerbations.  SIGNS AND SYMPTOMS  · Increased coughing.  · Increased thick spit (sputum) production.  · Increased wheezing.  · Increased shortness of breath.  · Rapid breathing.  · Chest tightness.  DIAGNOSIS  Your medical history, a physical exam, and tests will help your health care provider make a diagnosis. Tests may include:  · A chest X-ray.  · Basic lab tests.  · Sputum testing.  · An arterial blood gas test.  TREATMENT  Depending on the severity of your COPD exacerbation, you may need to be admitted to a hospital for treatment. Some of the treatments commonly used to treat COPD exacerbations are:   · Antibiotic medicines.  · Bronchodilators. These are drugs that expand the air passages. They may be given with an inhaler or nebulizer. Spacer devices may be needed to help improve drug delivery.  · Corticosteroid medicines.  · Supplemental oxygen therapy.  · Airway clearing techniques, such as noninvasive ventilation (NIV) and positive expiratory pressure (PEP). These provide respiratory support through a mask or other noninvasive device.  HOME CARE INSTRUCTIONS  · Do not smoke. Quitting smoking is very important to prevent COPD from  getting worse and exacerbations from happening as often.  · Avoid exposure to all substances that irritate the airway, especially to tobacco smoke.  · If you were prescribed an antibiotic medicine, finish it all even if you start to feel better.  · Take all medicines as directed by your health care provider. It is important to use correct technique with inhaled medicines.  · Drink enough fluids to keep your urine clear or pale yellow (unless you have a medical condition that requires fluid restriction).  · Use a cool mist vaporizer. This makes it easier to clear your chest when you cough.  · If you have a home nebulizer and oxygen, continue to use them as directed.  · Maintain all necessary vaccinations to prevent infections.  · Exercise regularly.  · Eat a healthy diet.  · Keep all follow-up appointments as directed by your health care provider.  SEEK IMMEDIATE MEDICAL CARE IF:  · You have worsening shortness of breath.  · You have trouble talking.  · You have severe chest pain.  · You have blood in your sputum.  · You have a fever.  · You have weakness, vomit repeatedly, or faint.  · You feel confused.  · You continue to get worse.  MAKE SURE YOU:  · Understand these instructions.  · Will watch your condition.  · Will get help right away if you are not doing well or get worse.     This information is not intended to replace advice given to you by your health care provider. Make sure you discuss any questions you have with your health care provider.     Document Released: 10/14/2008 Document Revised: 01/08/2016 Document Reviewed: 08/22/2014  [employer] Patient Education ©2016 [employer]      Smoking Cessation, Tips for Success  If you are ready to quit smoking, congratulations! You have chosen to help yourself be healthier. Cigarettes bring nicotine, tar, carbon monoxide, and other irritants into your body. Your lungs, heart, and blood vessels will be able to work better without these poisons. There  "are many different ways to quit smoking. Nicotine gum, nicotine patches, a nicotine inhaler, or nicotine nasal spray can help with physical craving. Hypnosis, support groups, and medicines help break the habit of smoking.  WHAT THINGS CAN I DO TO MAKE QUITTING EASIER?   Here are some tips to help you quit for good:  · Pick a date when you will quit smoking completely. Tell all of your friends and family about your plan to quit on that date.  · Do not try to slowly cut down on the number of cigarettes you are smoking. Pick a quit date and quit smoking completely starting on that day.  · Throw away all cigarettes.    · Clean and remove all ashtrays from your home, work, and car.  · On a card, write down your reasons for quitting. Carry the card with you and read it when you get the urge to smoke.  · Cleanse your body of nicotine. Drink enough water and fluids to keep your urine clear or pale yellow. Do this after quitting to flush the nicotine from your body.  · Learn to predict your moods. Do not let a bad situation be your excuse to have a cigarette. Some situations in your life might tempt you into wanting a cigarette.  · Never have \"just one\" cigarette. It leads to wanting another and another. Remind yourself of your decision to quit.  · Change habits associated with smoking. If you smoked while driving or when feeling stressed, try other activities to replace smoking. Stand up when drinking your coffee. Brush your teeth after eating. Sit in a different chair when you read the paper. Avoid alcohol while trying to quit, and try to drink fewer caffeinated beverages. Alcohol and caffeine may urge you to smoke.  · Avoid foods and drinks that can trigger a desire to smoke, such as sugary or spicy foods and alcohol.  · Ask people who smoke not to smoke around you.  · Have something planned to do right after eating or having a cup of coffee. For example, plan to take a walk or exercise.  · Try a relaxation exercise to " "calm you down and decrease your stress. Remember, you may be tense and nervous for the first 2 weeks after you quit, but this will pass.  · Find new activities to keep your hands busy. Play with a pen, coin, or rubber band. Doodle or draw things on paper.  · Brush your teeth right after eating. This will help cut down on the craving for the taste of tobacco after meals. You can also try mouthwash.    · Use oral substitutes in place of cigarettes. Try using lemon drops, carrots, cinnamon sticks, or chewing gum. Keep them handy so they are available when you have the urge to smoke.  · When you have the urge to smoke, try deep breathing.  · Designate your home as a nonsmoking area.  · If you are a heavy smoker, ask your health care provider about a prescription for nicotine chewing gum. It can ease your withdrawal from nicotine.  · Reward yourself. Set aside the cigarette money you save and buy yourself something nice.  · Look for support from others. Join a support group or smoking cessation program. Ask someone at home or at work to help you with your plan to quit smoking.  · Always ask yourself, \"Do I need this cigarette or is this just a reflex?\" Tell yourself, \"Today, I choose not to smoke,\" or \"I do not want to smoke.\" You are reminding yourself of your decision to quit.  · Do not replace cigarette smoking with electronic cigarettes (commonly called e-cigarettes). The safety of e-cigarettes is unknown, and some may contain harmful chemicals.  · If you relapse, do not give up! Plan ahead and think about what you will do the next time you get the urge to smoke.  HOW WILL I FEEL WHEN I QUIT SMOKING?  You may have symptoms of withdrawal because your body is used to nicotine (the addictive substance in cigarettes). You may crave cigarettes, be irritable, feel very hungry, cough often, get headaches, or have difficulty concentrating. The withdrawal symptoms are only temporary. They are strongest when you first quit but " will go away within 10-14 days. When withdrawal symptoms occur, stay in control. Think about your reasons for quitting. Remind yourself that these are signs that your body is healing and getting used to being without cigarettes. Remember that withdrawal symptoms are easier to treat than the major diseases that smoking can cause.   Even after the withdrawal is over, expect periodic urges to smoke. However, these cravings are generally short lived and will go away whether you smoke or not. Do not smoke!  WHAT RESOURCES ARE AVAILABLE TO HELP ME QUIT SMOKING?  Your health care provider can direct you to community resources or hospitals for support, which may include:  · Group support.  · Education.  · Hypnosis.  · Therapy.     This information is not intended to replace advice given to you by your health care provider. Make sure you discuss any questions you have with your health care provider.     Document Released: 09/15/2005 Document Revised: 01/08/2016 Document Reviewed: 06/05/2014  [employer] Patient Education ©2016 [employer]

## 2017-12-28 RX ORDER — IPRATROPIUM BROMIDE AND ALBUTEROL SULFATE 2.5; .5 MG/3ML; MG/3ML
3 SOLUTION RESPIRATORY (INHALATION) EVERY 6 HOURS PRN
Qty: 60 VIAL | Refills: 3 | Status: SHIPPED | OUTPATIENT
Start: 2017-12-28 | End: 2018-01-08

## 2018-01-08 ENCOUNTER — OFFICE VISIT (OUTPATIENT)
Dept: URGENT CARE | Facility: PHYSICIAN GROUP | Age: 48
End: 2018-01-08
Payer: MEDICAID

## 2018-01-08 VITALS
DIASTOLIC BLOOD PRESSURE: 78 MMHG | OXYGEN SATURATION: 93 % | SYSTOLIC BLOOD PRESSURE: 122 MMHG | HEART RATE: 104 BPM | HEIGHT: 74 IN | WEIGHT: 250 LBS | RESPIRATION RATE: 16 BRPM | BODY MASS INDEX: 32.08 KG/M2 | TEMPERATURE: 98.5 F

## 2018-01-08 DIAGNOSIS — J06.9 URI WITH COUGH AND CONGESTION: ICD-10-CM

## 2018-01-08 DIAGNOSIS — J30.89 ENVIRONMENTAL AND SEASONAL ALLERGIES: ICD-10-CM

## 2018-01-08 DIAGNOSIS — Z72.0 TOBACCO ABUSE: ICD-10-CM

## 2018-01-08 DIAGNOSIS — E78.2 MIXED HYPERLIPIDEMIA: ICD-10-CM

## 2018-01-08 DIAGNOSIS — R06.02 SOB (SHORTNESS OF BREATH): ICD-10-CM

## 2018-01-08 DIAGNOSIS — J30.0 ACUTE VASOMOTOR RHINITIS: ICD-10-CM

## 2018-01-08 DIAGNOSIS — R06.2 WHEEZES: ICD-10-CM

## 2018-01-08 DIAGNOSIS — E66.9 OBESITY (BMI 30-39.9): ICD-10-CM

## 2018-01-08 PROCEDURE — 99214 OFFICE O/P EST MOD 30 MIN: CPT | Performed by: PHYSICIAN ASSISTANT

## 2018-01-08 RX ORDER — CETIRIZINE HYDROCHLORIDE 10 MG/1
10 TABLET ORAL DAILY
Qty: 30 TAB | Refills: 0 | Status: SHIPPED | OUTPATIENT
Start: 2018-01-08 | End: 2018-03-01 | Stop reason: SDUPTHER

## 2018-01-08 RX ORDER — METHYLPREDNISOLONE 4 MG/1
TABLET ORAL
Qty: 21 TAB | Refills: 0 | Status: SHIPPED | OUTPATIENT
Start: 2018-01-08 | End: 2018-08-29

## 2018-01-08 RX ORDER — ALBUTEROL SULFATE 90 UG/1
2 AEROSOL, METERED RESPIRATORY (INHALATION) EVERY 6 HOURS PRN
Qty: 8.5 G | Refills: 1 | Status: SHIPPED | OUTPATIENT
Start: 2018-01-08 | End: 2018-08-21 | Stop reason: SDUPTHER

## 2018-01-08 RX ORDER — ALBUTEROL SULFATE 2.5 MG/3ML
2.5 SOLUTION RESPIRATORY (INHALATION) EVERY 4 HOURS PRN
Qty: 75 BULLET | Refills: 0 | Status: SHIPPED | OUTPATIENT
Start: 2018-01-08 | End: 2018-07-16 | Stop reason: SDUPTHER

## 2018-01-08 RX ORDER — BUPROPION HYDROCHLORIDE 100 MG/1
100 TABLET ORAL 2 TIMES DAILY
Qty: 60 TAB | Refills: 0 | Status: SHIPPED | OUTPATIENT
Start: 2018-01-08 | End: 2018-03-01 | Stop reason: SDUPTHER

## 2018-01-08 RX ORDER — DOXYCYCLINE HYCLATE 100 MG
100 TABLET ORAL 2 TIMES DAILY
Qty: 20 TAB | Refills: 0 | Status: SHIPPED | OUTPATIENT
Start: 2018-01-08 | End: 2018-07-19

## 2018-01-08 RX ORDER — CODEINE PHOSPHATE AND GUAIFENESIN 10; 100 MG/5ML; MG/5ML
5 SOLUTION ORAL EVERY 12 HOURS PRN
Qty: 100 ML | Refills: 0 | Status: SHIPPED | OUTPATIENT
Start: 2018-01-08 | End: 2018-01-18

## 2018-01-08 RX ORDER — FLUTICASONE PROPIONATE 50 MCG
1 SPRAY, SUSPENSION (ML) NASAL 2 TIMES DAILY
Qty: 1 BOTTLE | Refills: 0 | Status: SHIPPED | OUTPATIENT
Start: 2018-01-08 | End: 2022-04-02

## 2018-01-08 RX ORDER — LOVASTATIN 10 MG/1
20 TABLET ORAL
Qty: 30 TAB | Refills: 0 | Status: SHIPPED | OUTPATIENT
Start: 2018-01-08 | End: 2018-03-01 | Stop reason: SDUPTHER

## 2018-01-08 NOTE — PROGRESS NOTES
Chief Complaint   Patient presents with   • Medication Refill     Albuterol       HISTORY OF PRESENT ILLNESS: Patient is a 47 y.o. male who presents today for the following:    Cough x 10-14 days  + sputum (green), chills/sweats, nasal congestion, ST, SOB, h/o asthma, not sleeping due to cough  Denies ear pain  Flu shot: no  OTC meds tried: cough drops    Needs med refills:  Out of lovastatin x 3 weeks  Missed PCP appt due to  school  Understands he needs to follow up with PCP     Patient Active Problem List    Diagnosis Date Noted   • Obesity (BMI 30-39.9) 01/08/2018   • Environmental and seasonal allergies 05/22/2017   • Mixed hyperlipidemia 05/22/2017   • Dupuytren's contracture of right hand 05/22/2017   • Severe persistent asthma, uncomplicated 05/09/2017   • Tobacco abuse 05/09/2017       Allergies:Patient has no known allergies.    Current Outpatient Prescriptions Ordered in [facility]   Medication Sig Dispense Refill   • albuterol 108 (90 Base) MCG/ACT Aero Soln inhalation aerosol Inhale 2 Puffs by mouth every 6 hours as needed for Shortness of Breath. 8.5 g 1   • lovastatin (MEVACOR) 10 MG tablet Take 2 Tabs by mouth every bedtime. 30 Tab 0   • cetirizine (ZYRTEC) 10 MG Tab Take 1 Tab by mouth every day. 30 Tab 0   • buPROPion (WELLBUTRIN) 100 MG Tab Take 1 Tab by mouth 2 times a day. 50mg PO BID for 2 weeks and then 100mg PO BID thereafter 60 Tab 0   • fluticasone-salmeterol (ADVAIR) 100-50 MCG/DOSE AEROSOL POWDER, BREATH ACTIVATED Inhale 1 Puff by mouth every 12 hours. 1 Inhaler 0   • fluticasone (FLONASE) 50 MCG/ACT nasal spray Spray 1 Spray in nose 2 times a day. 1 Bottle 0   • albuterol (PROVENTIL) 2.5mg/3ml Nebu Soln solution for nebulization 3 mL by Nebulization route every four hours as needed for Shortness of Breath. 75 Bullet 0   • MethylPREDNISolone (MEDROL DOSEPAK) 4 MG Tablet Therapy Pack Use as package directs 21 Tab 0   • doxycycline (VIBRAMYCIN) 100 MG Tab Take 1 Tab by mouth 2 times  "a day. 20 Tab 0   • guaifenesin-codeine (ROBITUSSIN AC) Solution oral solution Take 5 mL by mouth every 12 hours as needed for Cough for up to 10 days. 100 mL 0     No current Epic-ordered facility-administered medications on file.        Past Medical History:   Diagnosis Date   • Asthma    • Chronic airway obstruction, not elsewhere classified        Social History   Substance Use Topics   • Smoking status: Current Every Day Smoker     Packs/day: 1.00     Years: 40.00     Types: Cigarettes   • Smokeless tobacco: Never Used      Comment: now smoking 5 cigarettes daily working on quitting   • Alcohol use No       Family Status   Relation Status   • Father    • Maternal Grandfather    • Paternal Grandfather    • Paternal Grandmother    • Maternal Grandmother    • Mother      Family History   Problem Relation Age of Onset   • Lung Cancer Father    • Diabetes Father    • Cancer Father      bone   • Hyperlipidemia Father    • Diabetes Maternal Grandfather    • Diabetes Paternal Grandfather    • Diabetes Paternal Grandmother    • Diabetes Maternal Grandmother    • Asthma Mother    • Other Mother      varicose veins       ROS:    Review of Systems   Constitutional: Negative for fever, weight loss and malaise/fatigue.   HENT: Negative for ear pain, nosebleeds, and neck pain.    Eyes: Negative for blurred vision.   Respiratory:Positive for cough, sputum production, shortness of breath and wheezing.    Cardiovascular: Negative for chest pain, palpitations, orthopnea and leg swelling.   Gastrointestinal: Negative for heartburn, nausea, vomiting and abdominal pain.   Genitourinary: Negative for dysuria, urgency and frequency.       Exam:  Blood pressure 122/78, pulse (!) 104, temperature 36.9 °C (98.5 °F), resp. rate 16, height 1.88 m (6' 2.02\"), weight 113.4 kg (250 lb), SpO2 93 %.  General: Well developed, well nourished. No distress.  HEENT: Conjunctiva clear, lids without ptosis, PERRL/EOMI. Ears normal shape and contour, " canals are clear bilaterally, tympanic membranes are benign. Nasal mucosa benign. Oropharynx is without erythema, edema or exudates.   Pulmonary: Diffuse inspiratory and expiratory wheezes.  Cardiovascular: Regular rate and rhythm without murmur. No edema.   Neurologic: Grossly nonfocal.  Lymph: No cervical lymphadenopathy noted.  Skin: Warm, dry, good turgor. No rashes in visible areas.   Psych: Normal mood. Alert and oriented x3. Judgment and insight is normal.    Declines breathing treatment.    Assessment/Plan:  Take all medication as directed. Follow up for worsening or persistent symptoms.  1. URI with cough and congestion  doxycycline (VIBRAMYCIN) 100 MG Tab    guaifenesin-codeine (ROBITUSSIN AC) Solution oral solution    Use all medication as directed. Follow up for worsening or persistent symptoms.   2. Mixed hyperlipidemia  lovastatin (MEVACOR) 10 MG tablet    Refill lovastatin. Follow-up with primary care provider.   3. Environmental and seasonal allergies  cetirizine (ZYRTEC) 10 MG Tab   4. Tobacco abuse  buPROPion (WELLBUTRIN) 100 MG Tab    Patient is trying to quit smoking.   5. Acute vasomotor rhinitis  fluticasone (FLONASE) 50 MCG/ACT nasal spray    Continue Flonase as directed.   6. SOB (shortness of breath)  albuterol 108 (90 Base) MCG/ACT Aero Soln inhalation aerosol    albuterol (PROVENTIL) 2.5mg/3ml Nebu Soln solution for nebulization    MethylPREDNISolone (MEDROL DOSEPAK) 4 MG Tablet Therapy Pack    Use Medrol Dosepak as directed. Continue inhalers as directed.   7. Wheezes  albuterol 108 (90 Base) MCG/ACT Aero Soln inhalation aerosol    albuterol (PROVENTIL) 2.5mg/3ml Nebu Soln solution for nebulization    MethylPREDNISolone (MEDROL DOSEPAK) 4 MG Tablet Therapy Pack    Use Medrol Dosepak as directed. Continue inhalers as directed.   8. Obesity (BMI 30-39.9)  Patient identified as having weight management issue.  Appropriate orders and counseling given.

## 2018-03-01 DIAGNOSIS — J30.89 ENVIRONMENTAL AND SEASONAL ALLERGIES: ICD-10-CM

## 2018-03-01 DIAGNOSIS — E78.2 MIXED HYPERLIPIDEMIA: ICD-10-CM

## 2018-03-01 DIAGNOSIS — Z72.0 TOBACCO ABUSE: ICD-10-CM

## 2018-03-01 RX ORDER — CETIRIZINE HYDROCHLORIDE 10 MG/1
10 TABLET ORAL DAILY
Qty: 15 TAB | Refills: 0 | Status: SHIPPED | OUTPATIENT
Start: 2018-03-01 | End: 2018-07-19 | Stop reason: SDUPTHER

## 2018-03-01 RX ORDER — BUPROPION HYDROCHLORIDE 100 MG/1
100 TABLET ORAL 2 TIMES DAILY
Qty: 30 TAB | Refills: 0 | Status: SHIPPED | OUTPATIENT
Start: 2018-03-01 | End: 2018-07-19

## 2018-03-01 RX ORDER — LOVASTATIN 10 MG/1
20 TABLET ORAL
Qty: 30 TAB | Refills: 0 | Status: SHIPPED | OUTPATIENT
Start: 2018-03-01 | End: 2018-08-21

## 2018-03-01 NOTE — TELEPHONE ENCOUNTER
Called pt lvm that he needs to schedule appt before further refills.    Was the patient seen in the last year in this department? Yes     Does patient have an active prescription for medications requested? Yes     Received Request Via: Pharmacy

## 2018-05-14 ENCOUNTER — NON-PROVIDER VISIT (OUTPATIENT)
Dept: URGENT CARE | Facility: PHYSICIAN GROUP | Age: 48
End: 2018-05-14

## 2018-05-14 DIAGNOSIS — Z02.1 PRE-EMPLOYMENT DRUG SCREENING: ICD-10-CM

## 2018-05-14 PROCEDURE — 80305 DRUG TEST PRSMV DIR OPT OBS: CPT | Performed by: PHYSICIAN ASSISTANT

## 2018-06-17 ENCOUNTER — APPOINTMENT (OUTPATIENT)
Dept: RADIOLOGY | Facility: MEDICAL CENTER | Age: 48
DRG: 200 | End: 2018-06-17
Attending: SURGERY
Payer: OTHER MISCELLANEOUS

## 2018-06-17 ENCOUNTER — HOSPITAL ENCOUNTER (INPATIENT)
Facility: MEDICAL CENTER | Age: 48
LOS: 7 days | DRG: 200 | End: 2018-06-24
Attending: EMERGENCY MEDICINE | Admitting: SURGERY
Payer: OTHER MISCELLANEOUS

## 2018-06-17 ENCOUNTER — APPOINTMENT (OUTPATIENT)
Dept: RADIOLOGY | Facility: MEDICAL CENTER | Age: 48
DRG: 200 | End: 2018-06-17
Attending: INTERNAL MEDICINE
Payer: OTHER MISCELLANEOUS

## 2018-06-17 ENCOUNTER — APPOINTMENT (OUTPATIENT)
Dept: RADIOLOGY | Facility: MEDICAL CENTER | Age: 48
DRG: 200 | End: 2018-06-17
Attending: EMERGENCY MEDICINE
Payer: OTHER MISCELLANEOUS

## 2018-06-17 ENCOUNTER — NON-PROVIDER VISIT (OUTPATIENT)
Dept: OCCUPATIONAL MEDICINE | Facility: CLINIC | Age: 48
End: 2018-06-17
Payer: COMMERCIAL

## 2018-06-17 DIAGNOSIS — S29.9XXA CHEST INJURY, INITIAL ENCOUNTER: ICD-10-CM

## 2018-06-17 DIAGNOSIS — Z02.83 ENCOUNTER FOR DRUG SCREENING: ICD-10-CM

## 2018-06-17 DIAGNOSIS — S59.912A INJURY OF LEFT FOREARM, INITIAL ENCOUNTER: ICD-10-CM
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
## 2018-06-17 DIAGNOSIS — S22.49XA CLOSED FRACTURE OF MULTIPLE RIBS, UNSPECIFIED LATERALITY, INITIAL ENCOUNTER: ICD-10-CM

## 2018-06-17 PROBLEM — S27.0XXA TRAUMATIC PNEUMOTHORAX: Status: ACTIVE | Noted: 2018-06-17

## 2018-06-17 PROBLEM — E66.9 OBESITY (BMI 30-39.9): Status: ACTIVE | Noted: 2018-06-17

## 2018-06-17 PROBLEM — Z78.9 NO CONTRAINDICATION TO DEEP VEIN THROMBOSIS (DVT) PROPHYLAXIS: Status: ACTIVE | Noted: 2018-06-17

## 2018-06-17 PROBLEM — F17.200 CURRENTLY SMOKES TOBACCO: Status: ACTIVE | Noted: 2018-06-17

## 2018-06-17 PROBLEM — J44.9 COPD (CHRONIC OBSTRUCTIVE PULMONARY DISEASE) (HCC): Status: ACTIVE | Noted: 2018-06-17

## 2018-06-17 PROBLEM — S22.42XA CLOSED FRACTURE OF MULTIPLE RIBS OF LEFT SIDE: Status: ACTIVE | Noted: 2018-06-17
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
## 2018-06-17 PROBLEM — J45.909 MODERATE ASTHMA: Status: ACTIVE | Noted: 2018-06-17

## 2018-06-17 PROBLEM — S27.321A CONTUSION OF LEFT LUNG: Status: ACTIVE | Noted: 2018-06-17

## 2018-06-17 LAB
ABO GROUP BLD: NORMAL
ABO GROUP BLD: NORMAL
ALBUMIN SERPL BCP-MCNC: 4.5 G/DL (ref 3.2–4.9)
ALBUMIN/GLOB SERPL: 1.6 G/DL
ALP SERPL-CCNC: 57 U/L (ref 30–99)
ALT SERPL-CCNC: 31 U/L (ref 2–50)
ANION GAP SERPL CALC-SCNC: 7 MMOL/L (ref 0–11.9)
APTT PPP: 26.1 SEC (ref 24.7–36)
AST SERPL-CCNC: 22 U/L (ref 12–45)
BILIRUB SERPL-MCNC: 0.4 MG/DL (ref 0.1–1.5)
BLD GP AB SCN SERPL QL: NORMAL
BUN SERPL-MCNC: 9 MG/DL (ref 8–22)
CALCIUM SERPL-MCNC: 8.9 MG/DL (ref 8.5–10.5)
CHLORIDE SERPL-SCNC: 106 MMOL/L (ref 96–112)
CO2 SERPL-SCNC: 25 MMOL/L (ref 20–33)
CREAT SERPL-MCNC: 0.81 MG/DL (ref 0.5–1.4)
ERYTHROCYTE [DISTWIDTH] IN BLOOD BY AUTOMATED COUNT: 42.4 FL (ref 35.9–50)
ETHANOL BLD-MCNC: 0 G/DL
GLOBULIN SER CALC-MCNC: 2.8 G/DL (ref 1.9–3.5)
GLUCOSE SERPL-MCNC: 124 MG/DL (ref 65–99)
HCT VFR BLD AUTO: 44.4 % (ref 42–52)
HGB BLD-MCNC: 14.5 G/DL (ref 14–18)
INR PPP: 1.02 (ref 0.87–1.13)
MCH RBC QN AUTO: 29.2 PG (ref 27–33)
MCHC RBC AUTO-ENTMCNC: 32.7 G/DL (ref 33.7–35.3)
MCV RBC AUTO: 89.3 FL (ref 81.4–97.8)
PLATELET # BLD AUTO: 234 K/UL (ref 164–446)
PMV BLD AUTO: 10.3 FL (ref 9–12.9)
POTASSIUM SERPL-SCNC: 4 MMOL/L (ref 3.6–5.5)
PROT SERPL-MCNC: 7.3 G/DL (ref 6–8.2)
PROTHROMBIN TIME: 13.1 SEC (ref 12–14.6)
RBC # BLD AUTO: 4.97 M/UL (ref 4.7–6.1)
RH BLD: NORMAL
RH BLD: NORMAL
SODIUM SERPL-SCNC: 138 MMOL/L (ref 135–145)
WBC # BLD AUTO: 9.7 K/UL (ref 4.8–10.8)

## 2018-06-17 PROCEDURE — 71045 X-RAY EXAM CHEST 1 VIEW: CPT

## 2018-06-17 PROCEDURE — 71260 CT THORAX DX C+: CPT

## 2018-06-17 PROCEDURE — 700111 HCHG RX REV CODE 636 W/ 250 OVERRIDE (IP): Performed by: EMERGENCY MEDICINE

## 2018-06-17 PROCEDURE — 700101 HCHG RX REV CODE 250: Performed by: SURGERY

## 2018-06-17 PROCEDURE — 80307 DRUG TEST PRSMV CHEM ANLYZR: CPT

## 2018-06-17 PROCEDURE — 94640 AIRWAY INHALATION TREATMENT: CPT

## 2018-06-17 PROCEDURE — 96374 THER/PROPH/DIAG INJ IV PUSH: CPT

## 2018-06-17 PROCEDURE — 99285 EMERGENCY DEPT VISIT HI MDM: CPT

## 2018-06-17 PROCEDURE — 82075 ASSAY OF BREATH ETHANOL: CPT | Performed by: INTERNAL MEDICINE

## 2018-06-17 PROCEDURE — 72131 CT LUMBAR SPINE W/O DYE: CPT

## 2018-06-17 PROCEDURE — 73090 X-RAY EXAM OF FOREARM: CPT | Mod: LT

## 2018-06-17 PROCEDURE — 96375 TX/PRO/DX INJ NEW DRUG ADDON: CPT

## 2018-06-17 PROCEDURE — 86900 BLOOD TYPING SEROLOGIC ABO: CPT

## 2018-06-17 PROCEDURE — 72128 CT CHEST SPINE W/O DYE: CPT

## 2018-06-17 PROCEDURE — 86850 RBC ANTIBODY SCREEN: CPT

## 2018-06-17 PROCEDURE — 36415 COLL VENOUS BLD VENIPUNCTURE: CPT

## 2018-06-17 PROCEDURE — 85730 THROMBOPLASTIN TIME PARTIAL: CPT

## 2018-06-17 PROCEDURE — 94760 N-INVAS EAR/PLS OXIMETRY 1: CPT

## 2018-06-17 PROCEDURE — 700111 HCHG RX REV CODE 636 W/ 250 OVERRIDE (IP): Performed by: SURGERY

## 2018-06-17 PROCEDURE — 305948 HCHG GREEN TRAUMA ACT PRE-NOTIFY NO CC

## 2018-06-17 PROCEDURE — A9270 NON-COVERED ITEM OR SERVICE: HCPCS | Performed by: SURGERY

## 2018-06-17 PROCEDURE — 85610 PROTHROMBIN TIME: CPT

## 2018-06-17 PROCEDURE — 80305 DRUG TEST PRSMV DIR OPT OBS: CPT | Performed by: INTERNAL MEDICINE

## 2018-06-17 PROCEDURE — 80053 COMPREHEN METABOLIC PANEL: CPT

## 2018-06-17 PROCEDURE — 86901 BLOOD TYPING SEROLOGIC RH(D): CPT

## 2018-06-17 PROCEDURE — 700102 HCHG RX REV CODE 250 W/ 637 OVERRIDE(OP): Performed by: SURGERY

## 2018-06-17 PROCEDURE — 770006 HCHG ROOM/CARE - MED/SURG/GYN SEMI*

## 2018-06-17 PROCEDURE — 85027 COMPLETE CBC AUTOMATED: CPT

## 2018-06-17 PROCEDURE — 73060 X-RAY EXAM OF HUMERUS: CPT | Mod: LT

## 2018-06-17 PROCEDURE — 304561 HCHG STAT O2

## 2018-06-17 RX ORDER — LOVASTATIN 20 MG/1
20 TABLET ORAL NIGHTLY
Status: DISCONTINUED | OUTPATIENT
Start: 2018-06-17 | End: 2018-06-24 | Stop reason: HOSPADM

## 2018-06-17 RX ORDER — BUPROPION HYDROCHLORIDE 100 MG/1
100 TABLET ORAL 2 TIMES DAILY
COMMUNITY
End: 2018-07-02

## 2018-06-17 RX ORDER — KETOROLAC TROMETHAMINE 30 MG/ML
30 INJECTION, SOLUTION INTRAMUSCULAR; INTRAVENOUS EVERY 6 HOURS
Status: COMPLETED | OUTPATIENT
Start: 2018-06-17 | End: 2018-06-20

## 2018-06-17 RX ORDER — IPRATROPIUM BROMIDE AND ALBUTEROL SULFATE 2.5; .5 MG/3ML; MG/3ML
3 SOLUTION RESPIRATORY (INHALATION) EVERY 6 HOURS PRN
Status: ON HOLD | COMMUNITY
End: 2018-06-24

## 2018-06-17 RX ORDER — LOVASTATIN 20 MG/1
20 TABLET ORAL NIGHTLY
COMMUNITY
End: 2018-07-02

## 2018-06-17 RX ORDER — OXYCODONE HYDROCHLORIDE 10 MG/1
10 TABLET ORAL
Status: DISCONTINUED | OUTPATIENT
Start: 2018-06-17 | End: 2018-06-24 | Stop reason: HOSPADM

## 2018-06-17 RX ORDER — CETIRIZINE HYDROCHLORIDE 10 MG/1
10 TABLET ORAL DAILY
COMMUNITY
End: 2022-04-02

## 2018-06-17 RX ORDER — MORPHINE SULFATE 15 MG/1
15 TABLET, FILM COATED, EXTENDED RELEASE ORAL EVERY 12 HOURS
Status: DISCONTINUED | OUTPATIENT
Start: 2018-06-17 | End: 2018-06-22

## 2018-06-17 RX ORDER — DOCUSATE SODIUM 100 MG/1
100 CAPSULE, LIQUID FILLED ORAL 2 TIMES DAILY
Status: DISCONTINUED | OUTPATIENT
Start: 2018-06-17 | End: 2018-06-24 | Stop reason: HOSPADM

## 2018-06-17 RX ORDER — POLYETHYLENE GLYCOL 3350 17 G/17G
1 POWDER, FOR SOLUTION ORAL 2 TIMES DAILY
Status: DISCONTINUED | OUTPATIENT
Start: 2018-06-17 | End: 2018-06-24 | Stop reason: HOSPADM

## 2018-06-17 RX ORDER — AMOXICILLIN 250 MG
1 CAPSULE ORAL
Status: DISCONTINUED | OUTPATIENT
Start: 2018-06-17 | End: 2018-06-24 | Stop reason: HOSPADM

## 2018-06-17 RX ORDER — LIDOCAINE 50 MG/G
2 PATCH TOPICAL EVERY 24 HOURS
Status: DISCONTINUED | OUTPATIENT
Start: 2018-06-17 | End: 2018-06-24 | Stop reason: HOSPADM

## 2018-06-17 RX ORDER — OXYCODONE HYDROCHLORIDE 5 MG/1
5 TABLET ORAL
Status: DISCONTINUED | OUTPATIENT
Start: 2018-06-17 | End: 2018-06-24 | Stop reason: HOSPADM

## 2018-06-17 RX ORDER — MORPHINE SULFATE 4 MG/ML
INJECTION, SOLUTION INTRAMUSCULAR; INTRAVENOUS
Status: COMPLETED | OUTPATIENT
Start: 2018-06-17 | End: 2018-06-17

## 2018-06-17 RX ORDER — IPRATROPIUM BROMIDE AND ALBUTEROL SULFATE 2.5; .5 MG/3ML; MG/3ML
3 SOLUTION RESPIRATORY (INHALATION)
Status: COMPLETED | OUTPATIENT
Start: 2018-06-17 | End: 2018-06-17

## 2018-06-17 RX ORDER — AMOXICILLIN 250 MG
1 CAPSULE ORAL NIGHTLY
Status: DISCONTINUED | OUTPATIENT
Start: 2018-06-17 | End: 2018-06-24 | Stop reason: HOSPADM

## 2018-06-17 RX ORDER — ONDANSETRON 2 MG/ML
INJECTION INTRAMUSCULAR; INTRAVENOUS
Status: COMPLETED | OUTPATIENT
Start: 2018-06-17 | End: 2018-06-17

## 2018-06-17 RX ORDER — ACETAMINOPHEN 500 MG
1000 TABLET ORAL EVERY 6 HOURS
Status: DISCONTINUED | OUTPATIENT
Start: 2018-06-17 | End: 2018-06-21

## 2018-06-17 RX ORDER — IPRATROPIUM BROMIDE AND ALBUTEROL SULFATE 2.5; .5 MG/3ML; MG/3ML
3 SOLUTION RESPIRATORY (INHALATION)
Status: DISCONTINUED | OUTPATIENT
Start: 2018-06-17 | End: 2018-06-18

## 2018-06-17 RX ORDER — ALBUTEROL SULFATE 90 UG/1
2 AEROSOL, METERED RESPIRATORY (INHALATION) EVERY 4 HOURS PRN
Status: DISCONTINUED | OUTPATIENT
Start: 2018-06-17 | End: 2018-06-24 | Stop reason: HOSPADM

## 2018-06-17 RX ORDER — BISACODYL 10 MG
10 SUPPOSITORY, RECTAL RECTAL
Status: DISCONTINUED | OUTPATIENT
Start: 2018-06-17 | End: 2018-06-24 | Stop reason: HOSPADM

## 2018-06-17 RX ORDER — SODIUM CHLORIDE, SODIUM LACTATE, POTASSIUM CHLORIDE, CALCIUM CHLORIDE 600; 310; 30; 20 MG/100ML; MG/100ML; MG/100ML; MG/100ML
INJECTION, SOLUTION INTRAVENOUS CONTINUOUS
Status: DISCONTINUED | OUTPATIENT
Start: 2018-06-17 | End: 2018-06-18

## 2018-06-17 RX ORDER — BACITRACIN ZINC AND POLYMYXIN B SULFATE 500; 1000 [USP'U]/G; [USP'U]/G
OINTMENT TOPICAL 3 TIMES DAILY
Status: DISCONTINUED | OUTPATIENT
Start: 2018-06-17 | End: 2018-06-24 | Stop reason: HOSPADM

## 2018-06-17 RX ORDER — GABAPENTIN 100 MG/1
100 CAPSULE ORAL 3 TIMES DAILY
Status: DISCONTINUED | OUTPATIENT
Start: 2018-06-17 | End: 2018-06-18

## 2018-06-17 RX ORDER — ALBUTEROL SULFATE 90 UG/1
2 AEROSOL, METERED RESPIRATORY (INHALATION) EVERY 4 HOURS PRN
Status: ON HOLD | COMMUNITY
End: 2018-06-24

## 2018-06-17 RX ORDER — ENEMA 19; 7 G/133ML; G/133ML
1 ENEMA RECTAL
Status: DISCONTINUED | OUTPATIENT
Start: 2018-06-17 | End: 2018-06-24 | Stop reason: HOSPADM

## 2018-06-17 RX ORDER — ONDANSETRON 2 MG/ML
4 INJECTION INTRAMUSCULAR; INTRAVENOUS EVERY 4 HOURS PRN
Status: DISCONTINUED | OUTPATIENT
Start: 2018-06-17 | End: 2018-06-24 | Stop reason: HOSPADM

## 2018-06-17 RX ADMIN — ALBUTEROL SULFATE 2.5 MG: 2.5 SOLUTION RESPIRATORY (INHALATION) at 23:26

## 2018-06-17 RX ADMIN — MORPHINE SULFATE 15 MG: 15 TABLET, EXTENDED RELEASE ORAL at 20:36

## 2018-06-17 RX ADMIN — LOVASTATIN 20 MG: 20 TABLET ORAL at 20:35

## 2018-06-17 RX ADMIN — OXYCODONE HYDROCHLORIDE 10 MG: 10 TABLET ORAL at 20:36

## 2018-06-17 RX ADMIN — KETOROLAC TROMETHAMINE 30 MG: 30 INJECTION, SOLUTION INTRAMUSCULAR at 17:30

## 2018-06-17 RX ADMIN — GABAPENTIN 100 MG: 100 CAPSULE ORAL at 17:57

## 2018-06-17 RX ADMIN — Medication 1 EACH: at 18:05

## 2018-06-17 RX ADMIN — ONDANSETRON HYDROCHLORIDE 4 MG: 2 INJECTION, SOLUTION INTRAMUSCULAR; INTRAVENOUS at 14:33

## 2018-06-17 RX ADMIN — OXYCODONE HYDROCHLORIDE 10 MG: 10 TABLET ORAL at 17:30

## 2018-06-17 RX ADMIN — MORPHINE SULFATE 4 MG: 4 INJECTION INTRAVENOUS at 14:43

## 2018-06-17 RX ADMIN — GABAPENTIN 100 MG: 100 CAPSULE ORAL at 20:36

## 2018-06-17 RX ADMIN — MORPHINE SULFATE 4 MG: 4 INJECTION INTRAVENOUS at 14:33

## 2018-06-17 RX ADMIN — LIDOCAINE 2 PATCH: 50 PATCH TOPICAL at 18:04

## 2018-06-17 RX ADMIN — IPRATROPIUM BROMIDE AND ALBUTEROL SULFATE 3 ML: .5; 3 SOLUTION RESPIRATORY (INHALATION) at 17:04

## 2018-06-17 RX ADMIN — ACETAMINOPHEN 1000 MG: 500 TABLET ORAL at 17:57

## 2018-06-17 ASSESSMENT — PATIENT HEALTH QUESTIONNAIRE - PHQ9
SUM OF ALL RESPONSES TO PHQ9 QUESTIONS 1 AND 2: 0
1. LITTLE INTEREST OR PLEASURE IN DOING THINGS: NOT AT ALL
2. FEELING DOWN, DEPRESSED, IRRITABLE, OR HOPELESS: NOT AT ALL

## 2018-06-17 ASSESSMENT — COPD QUESTIONNAIRES
HAVE YOU SMOKED AT LEAST 100 CIGARETTES IN YOUR ENTIRE LIFE: YES
COPD SCREENING SCORE: 3
IN THE PAST 12 MONTHS DO YOU DO LESS THAN YOU USED TO BECAUSE OF YOUR BREATHING PROBLEMS: DISAGREE/UNSURE
HAVE YOU SMOKED AT LEAST 100 CIGARETTES IN YOUR ENTIRE LIFE: YES
DURING THE PAST 4 WEEKS HOW MUCH DID YOU FEEL SHORT OF BREATH: SOME OF THE TIME
DURING THE PAST 4 WEEKS HOW MUCH DID YOU FEEL SHORT OF BREATH: NONE/LITTLE OF THE TIME
DO YOU EVER COUGH UP ANY MUCUS OR PHLEGM?: NO/ONLY WITH OCCASIONAL COLDS OR INFECTIONS
DO YOU EVER COUGH UP ANY MUCUS OR PHLEGM?: YES, EVERY DAY
COPD SCREENING SCORE: 6

## 2018-06-17 ASSESSMENT — PAIN SCALES - GENERAL
PAINLEVEL_OUTOF10: 8
PAINLEVEL_OUTOF10: 8
PAINLEVEL_OUTOF10: 9
PAINLEVEL_OUTOF10: 10
PAINLEVEL_OUTOF10: 8

## 2018-06-17 ASSESSMENT — LIFESTYLE VARIABLES
EVER_SMOKED: YES
ALCOHOL_USE: NO
EVER_SMOKED: YES
DO YOU DRINK ALCOHOL: NO
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
## 2018-06-17 NOTE — ED NOTES
Med rec updated and complete.  Allergies reviewed.   Pt denies antibiotic use  In last   30 days. All AM doses taken.  Pt denies over the counter medications.
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
## 2018-06-18 ENCOUNTER — APPOINTMENT (OUTPATIENT)
Dept: RADIOLOGY | Facility: MEDICAL CENTER | Age: 48
DRG: 200 | End: 2018-06-18
Attending: SURGERY
Payer: OTHER MISCELLANEOUS

## 2018-06-18 ENCOUNTER — APPOINTMENT (OUTPATIENT)
Dept: RADIOLOGY | Facility: MEDICAL CENTER | Age: 48
DRG: 200 | End: 2018-06-18
Attending: INTERNAL MEDICINE
Payer: OTHER MISCELLANEOUS

## 2018-06-18 LAB
ALBUMIN SERPL BCP-MCNC: 4 G/DL (ref 3.2–4.9)
ALBUMIN/GLOB SERPL: 2.1 G/DL
ALP SERPL-CCNC: 52 U/L (ref 30–99)
ALT SERPL-CCNC: 26 U/L (ref 2–50)
ANION GAP SERPL CALC-SCNC: 9 MMOL/L (ref 0–11.9)
AST SERPL-CCNC: 20 U/L (ref 12–45)
BASOPHILS # BLD AUTO: 0.4 % (ref 0–1.8)
BASOPHILS # BLD: 0.03 K/UL (ref 0–0.12)
BILIRUB SERPL-MCNC: 0.6 MG/DL (ref 0.1–1.5)
BUN SERPL-MCNC: 14 MG/DL (ref 8–22)
CALCIUM SERPL-MCNC: 8.6 MG/DL (ref 8.5–10.5)
CHLORIDE SERPL-SCNC: 106 MMOL/L (ref 96–112)
CO2 SERPL-SCNC: 25 MMOL/L (ref 20–33)
CREAT SERPL-MCNC: 0.89 MG/DL (ref 0.5–1.4)
EOSINOPHIL # BLD AUTO: 0.19 K/UL (ref 0–0.51)
EOSINOPHIL NFR BLD: 2.2 % (ref 0–6.9)
ERYTHROCYTE [DISTWIDTH] IN BLOOD BY AUTOMATED COUNT: 43.2 FL (ref 35.9–50)
GLOBULIN SER CALC-MCNC: 1.9 G/DL (ref 1.9–3.5)
GLUCOSE SERPL-MCNC: 89 MG/DL (ref 65–99)
HCT VFR BLD AUTO: 39.7 % (ref 42–52)
HGB BLD-MCNC: 12.9 G/DL (ref 14–18)
IMM GRANULOCYTES # BLD AUTO: 0.08 K/UL (ref 0–0.11)
IMM GRANULOCYTES NFR BLD AUTO: 0.9 % (ref 0–0.9)
LYMPHOCYTES # BLD AUTO: 2.8 K/UL (ref 1–4.8)
LYMPHOCYTES NFR BLD: 32.8 % (ref 22–41)
MAGNESIUM SERPL-MCNC: 1.9 MG/DL (ref 1.5–2.5)
MCH RBC QN AUTO: 28.9 PG (ref 27–33)
MCHC RBC AUTO-ENTMCNC: 32.5 G/DL (ref 33.7–35.3)
MCV RBC AUTO: 89 FL (ref 81.4–97.8)
MONOCYTES # BLD AUTO: 1 K/UL (ref 0–0.85)
MONOCYTES NFR BLD AUTO: 11.7 % (ref 0–13.4)
NEUTROPHILS # BLD AUTO: 4.44 K/UL (ref 1.82–7.42)
NEUTROPHILS NFR BLD: 52 % (ref 44–72)
NRBC # BLD AUTO: 0 K/UL
NRBC BLD-RTO: 0 /100 WBC
PHOSPHATE SERPL-MCNC: 4.9 MG/DL (ref 2.5–4.5)
PLATELET # BLD AUTO: 207 K/UL (ref 164–446)
PMV BLD AUTO: 11 FL (ref 9–12.9)
POTASSIUM SERPL-SCNC: 3.9 MMOL/L (ref 3.6–5.5)
PROT SERPL-MCNC: 5.9 G/DL (ref 6–8.2)
RBC # BLD AUTO: 4.46 M/UL (ref 4.7–6.1)
SODIUM SERPL-SCNC: 140 MMOL/L (ref 135–145)
WBC # BLD AUTO: 8.5 K/UL (ref 4.8–10.8)

## 2018-06-18 PROCEDURE — 80053 COMPREHEN METABOLIC PANEL: CPT

## 2018-06-18 PROCEDURE — 71045 X-RAY EXAM CHEST 1 VIEW: CPT

## 2018-06-18 PROCEDURE — 700102 HCHG RX REV CODE 250 W/ 637 OVERRIDE(OP): Performed by: NURSE PRACTITIONER

## 2018-06-18 PROCEDURE — 770006 HCHG ROOM/CARE - MED/SURG/GYN SEMI*

## 2018-06-18 PROCEDURE — 85025 COMPLETE CBC W/AUTO DIFF WBC: CPT

## 2018-06-18 PROCEDURE — 94760 N-INVAS EAR/PLS OXIMETRY 1: CPT

## 2018-06-18 PROCEDURE — 700111 HCHG RX REV CODE 636 W/ 250 OVERRIDE (IP): Performed by: SURGERY

## 2018-06-18 PROCEDURE — 36415 COLL VENOUS BLD VENIPUNCTURE: CPT

## 2018-06-18 PROCEDURE — 84100 ASSAY OF PHOSPHORUS: CPT

## 2018-06-18 PROCEDURE — 83735 ASSAY OF MAGNESIUM: CPT

## 2018-06-18 PROCEDURE — A9270 NON-COVERED ITEM OR SERVICE: HCPCS | Performed by: NURSE PRACTITIONER

## 2018-06-18 PROCEDURE — 700112 HCHG RX REV CODE 229: Performed by: SURGERY

## 2018-06-18 PROCEDURE — 700101 HCHG RX REV CODE 250: Performed by: SURGERY

## 2018-06-18 PROCEDURE — 94640 AIRWAY INHALATION TREATMENT: CPT

## 2018-06-18 PROCEDURE — A9270 NON-COVERED ITEM OR SERVICE: HCPCS | Performed by: SURGERY

## 2018-06-18 PROCEDURE — 94668 MNPJ CHEST WALL SBSQ: CPT

## 2018-06-18 PROCEDURE — 700102 HCHG RX REV CODE 250 W/ 637 OVERRIDE(OP): Performed by: SURGERY

## 2018-06-18 PROCEDURE — 700101 HCHG RX REV CODE 250

## 2018-06-18 RX ORDER — METHOCARBAMOL 500 MG/1
500 TABLET, FILM COATED ORAL 4 TIMES DAILY
Status: DISCONTINUED | OUTPATIENT
Start: 2018-06-18 | End: 2018-06-20

## 2018-06-18 RX ORDER — GABAPENTIN 100 MG/1
200 CAPSULE ORAL 3 TIMES DAILY
Status: DISCONTINUED | OUTPATIENT
Start: 2018-06-18 | End: 2018-06-20

## 2018-06-18 RX ORDER — IPRATROPIUM BROMIDE AND ALBUTEROL SULFATE 2.5; .5 MG/3ML; MG/3ML
3 SOLUTION RESPIRATORY (INHALATION)
Status: DISCONTINUED | OUTPATIENT
Start: 2018-06-18 | End: 2018-06-24 | Stop reason: HOSPADM

## 2018-06-18 RX ADMIN — KETOROLAC TROMETHAMINE 30 MG: 30 INJECTION, SOLUTION INTRAMUSCULAR at 17:40

## 2018-06-18 RX ADMIN — KETOROLAC TROMETHAMINE 30 MG: 30 INJECTION, SOLUTION INTRAMUSCULAR at 12:28

## 2018-06-18 RX ADMIN — OXYCODONE HYDROCHLORIDE 10 MG: 10 TABLET ORAL at 15:09

## 2018-06-18 RX ADMIN — ALBUTEROL SULFATE 2.5 MG: 2.5 SOLUTION RESPIRATORY (INHALATION) at 07:52

## 2018-06-18 RX ADMIN — POLYETHYLENE GLYCOL 3350 1 PACKET: 17 POWDER, FOR SOLUTION ORAL at 21:26

## 2018-06-18 RX ADMIN — Medication 2.5 MG: at 14:59

## 2018-06-18 RX ADMIN — MORPHINE SULFATE 15 MG: 15 TABLET, EXTENDED RELEASE ORAL at 09:00

## 2018-06-18 RX ADMIN — METHOCARBAMOL 500 MG: 500 TABLET ORAL at 21:26

## 2018-06-18 RX ADMIN — DOCUSATE SODIUM AND SENNOSIDES 1 TABLET: 8.6; 5 TABLET, FILM COATED ORAL at 21:26

## 2018-06-18 RX ADMIN — ACETAMINOPHEN 1000 MG: 500 TABLET ORAL at 00:13

## 2018-06-18 RX ADMIN — KETOROLAC TROMETHAMINE 30 MG: 30 INJECTION, SOLUTION INTRAMUSCULAR at 05:39

## 2018-06-18 RX ADMIN — POLYETHYLENE GLYCOL 3350 1 PACKET: 17 POWDER, FOR SOLUTION ORAL at 09:02

## 2018-06-18 RX ADMIN — DOCUSATE SODIUM 100 MG: 100 CAPSULE ORAL at 09:01

## 2018-06-18 RX ADMIN — MORPHINE SULFATE 15 MG: 15 TABLET, EXTENDED RELEASE ORAL at 21:27

## 2018-06-18 RX ADMIN — Medication 1 EACH: at 09:02

## 2018-06-18 RX ADMIN — MAGNESIUM HYDROXIDE 30 ML: 400 SUSPENSION ORAL at 09:01

## 2018-06-18 RX ADMIN — GABAPENTIN 100 MG: 100 CAPSULE ORAL at 09:01

## 2018-06-18 RX ADMIN — OXYCODONE HYDROCHLORIDE 10 MG: 10 TABLET ORAL at 05:39

## 2018-06-18 RX ADMIN — ACETAMINOPHEN 1000 MG: 500 TABLET ORAL at 05:39

## 2018-06-18 RX ADMIN — OXYCODONE HYDROCHLORIDE 10 MG: 10 TABLET ORAL at 18:13

## 2018-06-18 RX ADMIN — METHOCARBAMOL 500 MG: 500 TABLET ORAL at 11:16

## 2018-06-18 RX ADMIN — ALBUTEROL SULFATE 2.5 MG: 2.5 SOLUTION RESPIRATORY (INHALATION) at 03:10

## 2018-06-18 RX ADMIN — ACETAMINOPHEN 1000 MG: 500 TABLET ORAL at 17:40

## 2018-06-18 RX ADMIN — LIDOCAINE 2 PATCH: 50 PATCH TOPICAL at 18:52

## 2018-06-18 RX ADMIN — ENOXAPARIN SODIUM 30 MG: 100 INJECTION SUBCUTANEOUS at 09:01

## 2018-06-18 RX ADMIN — METHOCARBAMOL 500 MG: 500 TABLET ORAL at 15:01

## 2018-06-18 RX ADMIN — KETOROLAC TROMETHAMINE 30 MG: 30 INJECTION, SOLUTION INTRAMUSCULAR at 00:13

## 2018-06-18 RX ADMIN — ALBUTEROL SULFATE 2 PUFF: 90 AEROSOL, METERED RESPIRATORY (INHALATION) at 22:24

## 2018-06-18 RX ADMIN — ENOXAPARIN SODIUM 30 MG: 100 INJECTION SUBCUTANEOUS at 21:29

## 2018-06-18 RX ADMIN — DOCUSATE SODIUM 100 MG: 100 CAPSULE ORAL at 21:26

## 2018-06-18 RX ADMIN — METHOCARBAMOL 500 MG: 500 TABLET ORAL at 18:09

## 2018-06-18 RX ADMIN — OXYCODONE HYDROCHLORIDE 10 MG: 10 TABLET ORAL at 00:13

## 2018-06-18 RX ADMIN — ACETAMINOPHEN 1000 MG: 500 TABLET ORAL at 12:28

## 2018-06-18 RX ADMIN — LOVASTATIN 20 MG: 20 TABLET ORAL at 21:27

## 2018-06-18 RX ADMIN — ALBUTEROL SULFATE 2.5 MG: 2.5 SOLUTION RESPIRATORY (INHALATION) at 14:59

## 2018-06-18 RX ADMIN — GABAPENTIN 200 MG: 100 CAPSULE ORAL at 21:26

## 2018-06-18 RX ADMIN — OXYCODONE HYDROCHLORIDE 5 MG: 5 TABLET ORAL at 21:26

## 2018-06-18 RX ADMIN — IPRATROPIUM BROMIDE AND ALBUTEROL SULFATE 3 ML: .5; 3 SOLUTION RESPIRATORY (INHALATION) at 15:46

## 2018-06-18 RX ADMIN — GABAPENTIN 200 MG: 100 CAPSULE ORAL at 15:07

## 2018-06-18 ASSESSMENT — ENCOUNTER SYMPTOMS
SPUTUM PRODUCTION: 1
BLURRED VISION: 0
WHEEZING: 0
DOUBLE VISION: 0
BACK PAIN: 0
HEADACHES: 0
TINGLING: 0
NECK PAIN: 0
VOMITING: 0
SHORTNESS OF BREATH: 1
PALPITATIONS: 0
ABDOMINAL PAIN: 0
HEMOPTYSIS: 0
SPEECH CHANGE: 0
NAUSEA: 0
MYALGIAS: 1
FEVER: 0
FOCAL WEAKNESS: 0
DIZZINESS: 0
SENSORY CHANGE: 0
COUGH: 1
CHILLS: 0
ROS GI COMMENTS: BM PRIOR TO ARRIVAL

## 2018-06-18 ASSESSMENT — PAIN SCALES - GENERAL
PAINLEVEL_OUTOF10: 7
PAINLEVEL_OUTOF10: 7
PAINLEVEL_OUTOF10: 8
PAINLEVEL_OUTOF10: 5
PAINLEVEL_OUTOF10: 5
PAINLEVEL_OUTOF10: 8
PAINLEVEL_OUTOF10: 10
PAINLEVEL_OUTOF10: 7
PAINLEVEL_OUTOF10: 7

## 2018-06-18 ASSESSMENT — LIFESTYLE VARIABLES: SUBSTANCE_ABUSE: 0

## 2018-06-18 NOTE — PROGRESS NOTES
Chest X-Ray results called in to Giuseppe WYATT. Starting patient on aggressive pulmonary hygiene. Flutter valve, IS use and ambulation.

## 2018-06-18 NOTE — CARE PLAN
Problem: Communication  Goal: The ability to communicate needs accurately and effectively will improve  Outcome: PROGRESSING AS EXPECTED  Pt able to effectively communicate all needs    Problem: Safety  Goal: Will remain free from injury  Outcome: PROGRESSING AS EXPECTED  Pt utilizes call light appropriately.

## 2018-06-18 NOTE — PROGRESS NOTES
"RT was assessing pt and pt was found to have diminished O2 Sats, crackles and elevated O2 needs. VS assessed.   /72   Pulse 85   Temp 36.3 °C (97.3 °F)   Resp (!) 24   Ht 1.88 m (6' 2.02\")   Wt 109.9 kg (242 lb 4.6 oz)   SpO2 90%   BMI 31.09 kg/m²    Pt on 15L O2 by mask.   Stat Chest X-Ray ordered and Trauma team, Bret, notified.   Medicated patient for pain and will await X-ray results.     "

## 2018-06-18 NOTE — RESPIRATORY CARE
Around 1500Pt had increased WOB, Tachypnea, SOB and insp/exp. wheezing. That got worse after albuterol neb and pep therapy.  O2 demands went up requiring 15 lpm oxymask to keep sats at 90%, from .5LNC.  Chest xray ordered,  Duo neb TX given also.        At 1555 Pt is now on 4LNC with Spo2 at 91%.    1700 Pt still requiring 4LNC to maintain Spo2 at 91%.

## 2018-06-18 NOTE — PROGRESS NOTES
Pt aox4. No visible signs of distress. VSS.  Pt reports pain in left ribs, medicated per MAR.  No complaints of SOB.  Tolerating regular diet without n/v.  Bed locked and in low position.  Call light within reach and able to make al needs be known.  Will continue to monitor.

## 2018-06-18 NOTE — PROGRESS NOTES
Report received from NOC shift RN.   A/O X 4. 1L O2 by cannula. O2 Sat at 87%. Turned O2 up to 2L O2.   Pt Hypotensive overnight.   Labs reviewed.   Patient reports 5/10 pain level from left flank.  Breathing is shallow.   BS hypoactive X 4.   -BM. -flatus.   +void.   Patient has not ambulated d/t pain.   PIV on left AC running LR at 50mL/hr.   Educated patient on IS use, mobility and pneumonia.   Call light and family at bedside.

## 2018-06-18 NOTE — PROGRESS NOTES
"Contacted by nursing for increased oxygen demands.  Pt seen and examined     (S)   - Ambulated outside this AM  - 10/10 rib fracture pain and muscle spasms this afternoon.   - Feels wheezy on right side    (O)   Blood pressure (!) 122/7, pulse 81, temperature 36.3 °C (97.3 °F), resp. rate 20, height 1.88 m (6' 2.02\"), weight 109.9 kg (242 lb 4.6 oz), SpO2 91 %.    - Constitutional: Sitting up in bed. Talking in complete sentences. Appears in pain.     - Respiratory: Decreased oxygen requirements from 15 liter mask to 4 liters nasal cannula post pain  and muscle spasm medication administration. Diminished breath sounds right lower lobe and audible inspiratory and expiratory wheezes in right upper lobe.    - Cardiovascular: Normal heart rate. Blood pressure stable.    (A)    - Smoker with history of COPD and Asthma  - Multiple rib fractures  - Traumatic pneumothorax on admission imaging only. Not visualized on followup CXR.  - On Lovenox since admission and patient is ambulatory.  - Pt fell behind on multimodal pain and muscle spasm medications.    (P)  - Stat CXR  - Continue current multimodal pain/muscle spasm regimen   - Pt counseled regarding pain management   - Add flutter valve  - Continue aggressive pulmonary hygiene for traumatic injuries and history of asthma and COPD  - Pt reviewed with respiratory therapist and bedside RN   - Improving at this time. Continue care on dougherty.    Patient data reviewed and discussed.  Agree with assessment and plan.  TED    "

## 2018-06-18 NOTE — PROGRESS NOTES
Pt arrived to the floor via gurney, ambulated to bed with steady gait and SBA. Pt is on 3L NC with O2 saturations at 96%, pt has chest pain to the L chest wall at a 10/10, Lidocaine patches applied, pillow given for splinting purposes. IVF's running at bedside, water given, pt tolerating at this time, denies any N/V. Admit complete, POC discussed, all questions and concerns have been discussed. Bed is in locked/lowest position call light and personal items within reach. Pt educated on the use of the call, pt calls appropriately.    2 RN skin check complete, small abrasions noted to BLE, and redness/abrasions/bruising to L head. All other skin intact.
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
## 2018-06-19 ENCOUNTER — APPOINTMENT (OUTPATIENT)
Dept: RADIOLOGY | Facility: MEDICAL CENTER | Age: 48
DRG: 200 | End: 2018-06-19
Attending: SURGERY
Payer: OTHER MISCELLANEOUS

## 2018-06-19 LAB
ANION GAP SERPL CALC-SCNC: 7 MMOL/L (ref 0–11.9)
BASOPHILS # BLD AUTO: 0.2 % (ref 0–1.8)
BASOPHILS # BLD: 0.02 K/UL (ref 0–0.12)
BUN SERPL-MCNC: 15 MG/DL (ref 8–22)
CALCIUM SERPL-MCNC: 8.7 MG/DL (ref 8.5–10.5)
CHLORIDE SERPL-SCNC: 105 MMOL/L (ref 96–112)
CO2 SERPL-SCNC: 24 MMOL/L (ref 20–33)
CREAT SERPL-MCNC: 1 MG/DL (ref 0.5–1.4)
EOSINOPHIL # BLD AUTO: 0.13 K/UL (ref 0–0.51)
EOSINOPHIL NFR BLD: 1.5 % (ref 0–6.9)
ERYTHROCYTE [DISTWIDTH] IN BLOOD BY AUTOMATED COUNT: 43.2 FL (ref 35.9–50)
GLUCOSE SERPL-MCNC: 160 MG/DL (ref 65–99)
HCT VFR BLD AUTO: 38.7 % (ref 42–52)
HGB BLD-MCNC: 12.6 G/DL (ref 14–18)
IMM GRANULOCYTES # BLD AUTO: 0.08 K/UL (ref 0–0.11)
IMM GRANULOCYTES NFR BLD AUTO: 0.9 % (ref 0–0.9)
LYMPHOCYTES # BLD AUTO: 1.54 K/UL (ref 1–4.8)
LYMPHOCYTES NFR BLD: 18 % (ref 22–41)
MCH RBC QN AUTO: 29.2 PG (ref 27–33)
MCHC RBC AUTO-ENTMCNC: 32.6 G/DL (ref 33.7–35.3)
MCV RBC AUTO: 89.8 FL (ref 81.4–97.8)
MONOCYTES # BLD AUTO: 0.77 K/UL (ref 0–0.85)
MONOCYTES NFR BLD AUTO: 9 % (ref 0–13.4)
NEUTROPHILS # BLD AUTO: 6.03 K/UL (ref 1.82–7.42)
NEUTROPHILS NFR BLD: 70.4 % (ref 44–72)
NRBC # BLD AUTO: 0 K/UL
NRBC BLD-RTO: 0 /100 WBC
PLATELET # BLD AUTO: 175 K/UL (ref 164–446)
PMV BLD AUTO: 10.5 FL (ref 9–12.9)
POTASSIUM SERPL-SCNC: 4 MMOL/L (ref 3.6–5.5)
RBC # BLD AUTO: 4.31 M/UL (ref 4.7–6.1)
SODIUM SERPL-SCNC: 136 MMOL/L (ref 135–145)
WBC # BLD AUTO: 8.6 K/UL (ref 4.8–10.8)

## 2018-06-19 PROCEDURE — A9270 NON-COVERED ITEM OR SERVICE: HCPCS | Performed by: NURSE PRACTITIONER

## 2018-06-19 PROCEDURE — 700101 HCHG RX REV CODE 250: Performed by: SURGERY

## 2018-06-19 PROCEDURE — 700102 HCHG RX REV CODE 250 W/ 637 OVERRIDE(OP): Performed by: SURGERY

## 2018-06-19 PROCEDURE — A9270 NON-COVERED ITEM OR SERVICE: HCPCS | Performed by: SURGERY

## 2018-06-19 PROCEDURE — 71045 X-RAY EXAM CHEST 1 VIEW: CPT

## 2018-06-19 PROCEDURE — 770006 HCHG ROOM/CARE - MED/SURG/GYN SEMI*

## 2018-06-19 PROCEDURE — 80048 BASIC METABOLIC PNL TOTAL CA: CPT

## 2018-06-19 PROCEDURE — 94640 AIRWAY INHALATION TREATMENT: CPT

## 2018-06-19 PROCEDURE — 36415 COLL VENOUS BLD VENIPUNCTURE: CPT

## 2018-06-19 PROCEDURE — 700111 HCHG RX REV CODE 636 W/ 250 OVERRIDE (IP): Performed by: SURGERY

## 2018-06-19 PROCEDURE — 700102 HCHG RX REV CODE 250 W/ 637 OVERRIDE(OP): Performed by: NURSE PRACTITIONER

## 2018-06-19 PROCEDURE — 700112 HCHG RX REV CODE 229: Performed by: SURGERY

## 2018-06-19 PROCEDURE — 85025 COMPLETE CBC W/AUTO DIFF WBC: CPT

## 2018-06-19 RX ADMIN — MORPHINE SULFATE 15 MG: 15 TABLET, EXTENDED RELEASE ORAL at 09:03

## 2018-06-19 RX ADMIN — METHOCARBAMOL 500 MG: 500 TABLET ORAL at 20:36

## 2018-06-19 RX ADMIN — MAGNESIUM HYDROXIDE 30 ML: 400 SUSPENSION ORAL at 09:02

## 2018-06-19 RX ADMIN — ACETAMINOPHEN 1000 MG: 500 TABLET ORAL at 06:10

## 2018-06-19 RX ADMIN — ACETAMINOPHEN 1000 MG: 500 TABLET ORAL at 17:18

## 2018-06-19 RX ADMIN — METHOCARBAMOL 500 MG: 500 TABLET ORAL at 09:02

## 2018-06-19 RX ADMIN — METHOCARBAMOL 500 MG: 500 TABLET ORAL at 12:04

## 2018-06-19 RX ADMIN — LIDOCAINE 2 PATCH: 50 PATCH TOPICAL at 17:56

## 2018-06-19 RX ADMIN — POLYETHYLENE GLYCOL 3350 1 PACKET: 17 POWDER, FOR SOLUTION ORAL at 09:02

## 2018-06-19 RX ADMIN — METHOCARBAMOL 500 MG: 500 TABLET ORAL at 16:53

## 2018-06-19 RX ADMIN — OXYCODONE HYDROCHLORIDE 10 MG: 10 TABLET ORAL at 20:36

## 2018-06-19 RX ADMIN — OXYCODONE HYDROCHLORIDE 10 MG: 10 TABLET ORAL at 15:22

## 2018-06-19 RX ADMIN — KETOROLAC TROMETHAMINE 30 MG: 30 INJECTION, SOLUTION INTRAMUSCULAR at 17:19

## 2018-06-19 RX ADMIN — IPRATROPIUM BROMIDE AND ALBUTEROL SULFATE 3 ML: .5; 3 SOLUTION RESPIRATORY (INHALATION) at 00:50

## 2018-06-19 RX ADMIN — OXYCODONE HYDROCHLORIDE 10 MG: 10 TABLET ORAL at 06:10

## 2018-06-19 RX ADMIN — MORPHINE SULFATE 15 MG: 15 TABLET, EXTENDED RELEASE ORAL at 20:36

## 2018-06-19 RX ADMIN — KETOROLAC TROMETHAMINE 30 MG: 30 INJECTION, SOLUTION INTRAMUSCULAR at 00:32

## 2018-06-19 RX ADMIN — POLYETHYLENE GLYCOL 3350 1 PACKET: 17 POWDER, FOR SOLUTION ORAL at 20:36

## 2018-06-19 RX ADMIN — ALBUTEROL SULFATE 2 PUFF: 90 AEROSOL, METERED RESPIRATORY (INHALATION) at 16:53

## 2018-06-19 RX ADMIN — GABAPENTIN 200 MG: 100 CAPSULE ORAL at 15:22

## 2018-06-19 RX ADMIN — ACETAMINOPHEN 1000 MG: 500 TABLET ORAL at 00:32

## 2018-06-19 RX ADMIN — OXYCODONE HYDROCHLORIDE 10 MG: 10 TABLET ORAL at 11:21

## 2018-06-19 RX ADMIN — ACETAMINOPHEN 1000 MG: 500 TABLET ORAL at 12:04

## 2018-06-19 RX ADMIN — DOCUSATE SODIUM 100 MG: 100 CAPSULE ORAL at 09:03

## 2018-06-19 RX ADMIN — DOCUSATE SODIUM AND SENNOSIDES 1 TABLET: 8.6; 5 TABLET, FILM COATED ORAL at 20:36

## 2018-06-19 RX ADMIN — OXYCODONE HYDROCHLORIDE 10 MG: 10 TABLET ORAL at 00:36

## 2018-06-19 RX ADMIN — IPRATROPIUM BROMIDE AND ALBUTEROL SULFATE 3 ML: .5; 3 SOLUTION RESPIRATORY (INHALATION) at 16:59

## 2018-06-19 RX ADMIN — ENOXAPARIN SODIUM 30 MG: 100 INJECTION SUBCUTANEOUS at 09:02

## 2018-06-19 RX ADMIN — GABAPENTIN 200 MG: 100 CAPSULE ORAL at 20:35

## 2018-06-19 RX ADMIN — DOCUSATE SODIUM 100 MG: 100 CAPSULE ORAL at 20:36

## 2018-06-19 RX ADMIN — KETOROLAC TROMETHAMINE 30 MG: 30 INJECTION, SOLUTION INTRAMUSCULAR at 11:21

## 2018-06-19 RX ADMIN — ENOXAPARIN SODIUM 30 MG: 100 INJECTION SUBCUTANEOUS at 20:36

## 2018-06-19 RX ADMIN — KETOROLAC TROMETHAMINE 30 MG: 30 INJECTION, SOLUTION INTRAMUSCULAR at 06:10

## 2018-06-19 RX ADMIN — LOVASTATIN 20 MG: 20 TABLET ORAL at 20:36

## 2018-06-19 RX ADMIN — GABAPENTIN 200 MG: 100 CAPSULE ORAL at 09:03

## 2018-06-19 ASSESSMENT — ENCOUNTER SYMPTOMS
DOUBLE VISION: 0
HEADACHES: 0
BACK PAIN: 0
SENSORY CHANGE: 0
VOMITING: 0
COUGH: 1
NECK PAIN: 0
FOCAL WEAKNESS: 0
NAUSEA: 0
SPUTUM PRODUCTION: 0
FEVER: 0
SHORTNESS OF BREATH: 1
SPEECH CHANGE: 0
CHILLS: 0
ABDOMINAL PAIN: 0

## 2018-06-19 ASSESSMENT — PAIN SCALES - GENERAL
PAINLEVEL_OUTOF10: 3
PAINLEVEL_OUTOF10: 5
PAINLEVEL_OUTOF10: 9
PAINLEVEL_OUTOF10: 5
PAINLEVEL_OUTOF10: 5
PAINLEVEL_OUTOF10: 3
PAINLEVEL_OUTOF10: 5
PAINLEVEL_OUTOF10: 6
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
## 2018-06-19 NOTE — RESPIRATORY CARE
COPD EDUCATION by COPD CLINICAL EDUCATOR  6/19/2018 at 6:51 AM by Sravanthi Alanis     Patient reviewed by COPD education team. Patient does not qualify for COPD program.

## 2018-06-19 NOTE — PROGRESS NOTES
Pt aox4. No visible signs of distress. VSS.  Pt reporting elevated pain in L flank, medicated per MAR.  One episode of SOB associated with pain, respiratory called, symptoms relieved.  No complaints of N/V.  Bed locked and in low position.  Hourly rounding in place.  Call light within reach and able to make all needs be known.   Will continue to monitor.

## 2018-06-19 NOTE — CARE PLAN
Problem: Pain Management  Goal: Pain level will decrease to patient's comfort goal  Outcome: PROGRESSING AS EXPECTED  Scheduled/ PRN pain medication on MAR. Pain well controlled with oral pain meds per pt. Ice and heat packs offered. Extra pillows in use.     Problem: Respiratory:  Goal: Respiratory status will improve  Outcome: PROGRESSING AS EXPECTED   in use. IS within reach. Max pull of 2000. Pt out of bed for meals. Encouraging deep breathing and coughing.

## 2018-06-19 NOTE — PROGRESS NOTES
Pt A&O x 4.  Reporting 5/10 pain. Pt medicated for pain per MAR.   Pt up self to bathroom. +void. +BM. Tolerating regular diet.  Expiratory wheezes heard upon auscultation.  Encouraging use of IS. Pt with max pull of 2000.   Pt on RA sating 91% at this time.  Right flank/chest bruising noted. Two small abrasions noted on knees.  POC discussed with pt. All needs addressed at this time.

## 2018-06-19 NOTE — PROGRESS NOTES
"Pt requesting nebulizer treatment. RT notified. Pt upset stating, \"I've been waiting almost an hour.\"  RT paged again. Albuterol inhaler provided until nebulizer can be given. RT to come see pt.   "

## 2018-06-20 ENCOUNTER — APPOINTMENT (OUTPATIENT)
Dept: RADIOLOGY | Facility: MEDICAL CENTER | Age: 48
DRG: 200 | End: 2018-06-20
Attending: SURGERY
Payer: OTHER MISCELLANEOUS

## 2018-06-20 LAB
ANION GAP SERPL CALC-SCNC: 7 MMOL/L (ref 0–11.9)
BASOPHILS # BLD AUTO: 0.1 % (ref 0–1.8)
BASOPHILS # BLD: 0.01 K/UL (ref 0–0.12)
BUN SERPL-MCNC: 15 MG/DL (ref 8–22)
CALCIUM SERPL-MCNC: 9.1 MG/DL (ref 8.5–10.5)
CHLORIDE SERPL-SCNC: 102 MMOL/L (ref 96–112)
CO2 SERPL-SCNC: 27 MMOL/L (ref 20–33)
CREAT SERPL-MCNC: 1.01 MG/DL (ref 0.5–1.4)
EOSINOPHIL # BLD AUTO: 0.09 K/UL (ref 0–0.51)
EOSINOPHIL NFR BLD: 1.1 % (ref 0–6.9)
ERYTHROCYTE [DISTWIDTH] IN BLOOD BY AUTOMATED COUNT: 42.5 FL (ref 35.9–50)
GLUCOSE SERPL-MCNC: 129 MG/DL (ref 65–99)
HCT VFR BLD AUTO: 40 % (ref 42–52)
HGB BLD-MCNC: 13.1 G/DL (ref 14–18)
IMM GRANULOCYTES # BLD AUTO: 0.17 K/UL (ref 0–0.11)
IMM GRANULOCYTES NFR BLD AUTO: 2 % (ref 0–0.9)
LYMPHOCYTES # BLD AUTO: 1.18 K/UL (ref 1–4.8)
LYMPHOCYTES NFR BLD: 13.8 % (ref 22–41)
MCH RBC QN AUTO: 29.2 PG (ref 27–33)
MCHC RBC AUTO-ENTMCNC: 32.8 G/DL (ref 33.7–35.3)
MCV RBC AUTO: 89.3 FL (ref 81.4–97.8)
MONOCYTES # BLD AUTO: 0.9 K/UL (ref 0–0.85)
MONOCYTES NFR BLD AUTO: 10.6 % (ref 0–13.4)
NEUTROPHILS # BLD AUTO: 6.18 K/UL (ref 1.82–7.42)
NEUTROPHILS NFR BLD: 72.4 % (ref 44–72)
NRBC # BLD AUTO: 0 K/UL
NRBC BLD-RTO: 0 /100 WBC
PLATELET # BLD AUTO: 194 K/UL (ref 164–446)
PMV BLD AUTO: 10.9 FL (ref 9–12.9)
POTASSIUM SERPL-SCNC: 4.5 MMOL/L (ref 3.6–5.5)
RBC # BLD AUTO: 4.48 M/UL (ref 4.7–6.1)
SODIUM SERPL-SCNC: 136 MMOL/L (ref 135–145)
WBC # BLD AUTO: 8.5 K/UL (ref 4.8–10.8)

## 2018-06-20 PROCEDURE — 700102 HCHG RX REV CODE 250 W/ 637 OVERRIDE(OP): Performed by: NURSE PRACTITIONER

## 2018-06-20 PROCEDURE — 94640 AIRWAY INHALATION TREATMENT: CPT

## 2018-06-20 PROCEDURE — A9270 NON-COVERED ITEM OR SERVICE: HCPCS | Performed by: NURSE PRACTITIONER

## 2018-06-20 PROCEDURE — 770006 HCHG ROOM/CARE - MED/SURG/GYN SEMI*

## 2018-06-20 PROCEDURE — A9270 NON-COVERED ITEM OR SERVICE: HCPCS | Performed by: SURGERY

## 2018-06-20 PROCEDURE — 80048 BASIC METABOLIC PNL TOTAL CA: CPT

## 2018-06-20 PROCEDURE — 700102 HCHG RX REV CODE 250 W/ 637 OVERRIDE(OP): Performed by: SURGERY

## 2018-06-20 PROCEDURE — 700101 HCHG RX REV CODE 250: Performed by: SURGERY

## 2018-06-20 PROCEDURE — 71045 X-RAY EXAM CHEST 1 VIEW: CPT

## 2018-06-20 PROCEDURE — 700111 HCHG RX REV CODE 636 W/ 250 OVERRIDE (IP): Performed by: SURGERY

## 2018-06-20 PROCEDURE — 36415 COLL VENOUS BLD VENIPUNCTURE: CPT

## 2018-06-20 PROCEDURE — 85025 COMPLETE CBC W/AUTO DIFF WBC: CPT

## 2018-06-20 RX ORDER — GABAPENTIN 300 MG/1
300 CAPSULE ORAL 3 TIMES DAILY
Status: DISCONTINUED | OUTPATIENT
Start: 2018-06-20 | End: 2018-06-24 | Stop reason: HOSPADM

## 2018-06-20 RX ORDER — METHOCARBAMOL 750 MG/1
750 TABLET, FILM COATED ORAL 4 TIMES DAILY
Status: DISCONTINUED | OUTPATIENT
Start: 2018-06-20 | End: 2018-06-24 | Stop reason: HOSPADM

## 2018-06-20 RX ADMIN — LOVASTATIN 20 MG: 20 TABLET ORAL at 21:13

## 2018-06-20 RX ADMIN — OXYCODONE HYDROCHLORIDE 10 MG: 10 TABLET ORAL at 12:51

## 2018-06-20 RX ADMIN — OXYCODONE HYDROCHLORIDE 10 MG: 10 TABLET ORAL at 21:14

## 2018-06-20 RX ADMIN — IPRATROPIUM BROMIDE AND ALBUTEROL SULFATE 3 ML: .5; 3 SOLUTION RESPIRATORY (INHALATION) at 21:08

## 2018-06-20 RX ADMIN — ACETAMINOPHEN 1000 MG: 500 TABLET ORAL at 05:53

## 2018-06-20 RX ADMIN — GABAPENTIN 300 MG: 300 CAPSULE ORAL at 14:28

## 2018-06-20 RX ADMIN — GABAPENTIN 200 MG: 100 CAPSULE ORAL at 08:34

## 2018-06-20 RX ADMIN — ACETAMINOPHEN 1000 MG: 500 TABLET ORAL at 00:52

## 2018-06-20 RX ADMIN — KETOROLAC TROMETHAMINE 30 MG: 30 INJECTION, SOLUTION INTRAMUSCULAR at 05:53

## 2018-06-20 RX ADMIN — METHOCARBAMOL 750 MG: 750 TABLET ORAL at 15:56

## 2018-06-20 RX ADMIN — OXYCODONE HYDROCHLORIDE 10 MG: 10 TABLET ORAL at 05:53

## 2018-06-20 RX ADMIN — OXYCODONE HYDROCHLORIDE 10 MG: 10 TABLET ORAL at 00:53

## 2018-06-20 RX ADMIN — METHOCARBAMOL 750 MG: 750 TABLET ORAL at 21:13

## 2018-06-20 RX ADMIN — LIDOCAINE 2 PATCH: 50 PATCH TOPICAL at 17:02

## 2018-06-20 RX ADMIN — METHOCARBAMOL 500 MG: 500 TABLET ORAL at 08:36

## 2018-06-20 RX ADMIN — MORPHINE SULFATE 15 MG: 15 TABLET, EXTENDED RELEASE ORAL at 21:13

## 2018-06-20 RX ADMIN — ACETAMINOPHEN 1000 MG: 500 TABLET ORAL at 17:01

## 2018-06-20 RX ADMIN — ACETAMINOPHEN 1000 MG: 500 TABLET ORAL at 11:20

## 2018-06-20 RX ADMIN — ALBUTEROL SULFATE 2 PUFF: 90 AEROSOL, METERED RESPIRATORY (INHALATION) at 20:45

## 2018-06-20 RX ADMIN — ENOXAPARIN SODIUM 30 MG: 100 INJECTION SUBCUTANEOUS at 08:35

## 2018-06-20 RX ADMIN — IPRATROPIUM BROMIDE AND ALBUTEROL SULFATE 3 ML: .5; 3 SOLUTION RESPIRATORY (INHALATION) at 01:47

## 2018-06-20 RX ADMIN — KETOROLAC TROMETHAMINE 30 MG: 30 INJECTION, SOLUTION INTRAMUSCULAR at 11:20

## 2018-06-20 RX ADMIN — KETOROLAC TROMETHAMINE 30 MG: 30 INJECTION, SOLUTION INTRAMUSCULAR at 00:52

## 2018-06-20 RX ADMIN — METHOCARBAMOL 750 MG: 750 TABLET ORAL at 12:51

## 2018-06-20 RX ADMIN — MORPHINE SULFATE 15 MG: 15 TABLET, EXTENDED RELEASE ORAL at 08:34

## 2018-06-20 RX ADMIN — GABAPENTIN 300 MG: 300 CAPSULE ORAL at 21:14

## 2018-06-20 RX ADMIN — Medication 1 EACH: at 08:34

## 2018-06-20 RX ADMIN — ENOXAPARIN SODIUM 30 MG: 100 INJECTION SUBCUTANEOUS at 21:14

## 2018-06-20 ASSESSMENT — ENCOUNTER SYMPTOMS
SHORTNESS OF BREATH: 1
GASTROINTESTINAL NEGATIVE: 1
MYALGIAS: 1
NEUROLOGICAL NEGATIVE: 1
ROS GI COMMENTS: BM 6/19
CARDIOVASCULAR NEGATIVE: 1
SPUTUM PRODUCTION: 0
PSYCHIATRIC NEGATIVE: 1
COUGH: 1
CONSTITUTIONAL NEGATIVE: 1
EYES NEGATIVE: 1

## 2018-06-20 ASSESSMENT — PAIN SCALES - GENERAL
PAINLEVEL_OUTOF10: 5
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
## 2018-06-20 NOTE — PROGRESS NOTES
Pt aox4. No visible signs of distress. VSS.  Pt tolerating medication regimen without any signs or symptoms of adverse reactions.  Pt reporting pain in left flank, medicated per MAR.  Expiratory wheezes throughout. Respiratory notified.  No complaints of n/v.  Bed locked and in low position.  Call light within reach and able to make all needs be known.  Will continue to monitor.

## 2018-06-20 NOTE — PROGRESS NOTES
Pt is A&O x's 4, VSS, pt on RA with O2 saturations WNL, pt pulling 2000 on IS. Pt has chest pain to the L chest wall at an 8/10,pillow given for splinting purposes. Pt having frequent loose BM's, stool softeners held, pt tolerating diet at this time, denies N/V.  POC discussed, all questions and concerns have been discussed. Bed is in locked/lowest position call light and personal items within reach. Pt educated on the use of the call, pt calls appropriately.

## 2018-06-20 NOTE — CARE PLAN
Problem: Pain Management  Goal: Pain level will decrease to patient's comfort goal  Outcome: PROGRESSING AS EXPECTED  Scheduled/ PRN pain medication on MAR. Pain well controlled with oral pain meds per pt. Ice and heat packs offered. Extra pillows for splinting utilized      Problem: Respiratory:  Goal: Respiratory status will improve  Outcome: PROGRESSING AS EXPECTED  Pt utilizing IS, pull of 2000. Pt out of bed for meals, ambulating frequently,deep breathing and ambulation encouraged.

## 2018-06-21 ENCOUNTER — APPOINTMENT (OUTPATIENT)
Dept: RADIOLOGY | Facility: MEDICAL CENTER | Age: 48
DRG: 200 | End: 2018-06-21
Attending: SURGERY
Payer: OTHER MISCELLANEOUS

## 2018-06-21 LAB
ANION GAP SERPL CALC-SCNC: 7 MMOL/L (ref 0–11.9)
BASOPHILS # BLD AUTO: 0 % (ref 0–1.8)
BASOPHILS # BLD: 0 K/UL (ref 0–0.12)
BUN SERPL-MCNC: 15 MG/DL (ref 8–22)
CALCIUM SERPL-MCNC: 8.6 MG/DL (ref 8.5–10.5)
CHLORIDE SERPL-SCNC: 104 MMOL/L (ref 96–112)
CO2 SERPL-SCNC: 25 MMOL/L (ref 20–33)
CREAT SERPL-MCNC: 1.12 MG/DL (ref 0.5–1.4)
EOSINOPHIL # BLD AUTO: 0 K/UL (ref 0–0.51)
EOSINOPHIL NFR BLD: 0 % (ref 0–6.9)
ERYTHROCYTE [DISTWIDTH] IN BLOOD BY AUTOMATED COUNT: 42.4 FL (ref 35.9–50)
GLUCOSE SERPL-MCNC: 118 MG/DL (ref 65–99)
HCT VFR BLD AUTO: 38.5 % (ref 42–52)
HGB BLD-MCNC: 12.4 G/DL (ref 14–18)
LYMPHOCYTES # BLD AUTO: 0.84 K/UL (ref 1–4.8)
LYMPHOCYTES NFR BLD: 17.4 % (ref 22–41)
MAGNESIUM SERPL-MCNC: 1.9 MG/DL (ref 1.5–2.5)
MANUAL DIFF BLD: NORMAL
MCH RBC QN AUTO: 28.8 PG (ref 27–33)
MCHC RBC AUTO-ENTMCNC: 32.2 G/DL (ref 33.7–35.3)
MCV RBC AUTO: 89.5 FL (ref 81.4–97.8)
MONOCYTES # BLD AUTO: 0.54 K/UL (ref 0–0.85)
MONOCYTES NFR BLD AUTO: 11.3 % (ref 0–13.4)
MORPHOLOGY BLD-IMP: NORMAL
NEUTROPHILS # BLD AUTO: 3.42 K/UL (ref 1.82–7.42)
NEUTROPHILS NFR BLD: 71.3 % (ref 44–72)
NRBC # BLD AUTO: 0 K/UL
NRBC BLD-RTO: 0 /100 WBC
PHOSPHATE SERPL-MCNC: 2.6 MG/DL (ref 2.5–4.5)
PLATELET # BLD AUTO: 174 K/UL (ref 164–446)
PLATELET BLD QL SMEAR: NORMAL
PMV BLD AUTO: 10.5 FL (ref 9–12.9)
POTASSIUM SERPL-SCNC: 4.7 MMOL/L (ref 3.6–5.5)
RBC # BLD AUTO: 4.3 M/UL (ref 4.7–6.1)
RBC BLD AUTO: NORMAL
SODIUM SERPL-SCNC: 136 MMOL/L (ref 135–145)
WBC # BLD AUTO: 4.8 K/UL (ref 4.8–10.8)

## 2018-06-21 PROCEDURE — 85027 COMPLETE CBC AUTOMATED: CPT

## 2018-06-21 PROCEDURE — A9270 NON-COVERED ITEM OR SERVICE: HCPCS | Performed by: SURGERY

## 2018-06-21 PROCEDURE — 80048 BASIC METABOLIC PNL TOTAL CA: CPT

## 2018-06-21 PROCEDURE — 700101 HCHG RX REV CODE 250: Performed by: SURGERY

## 2018-06-21 PROCEDURE — 84100 ASSAY OF PHOSPHORUS: CPT

## 2018-06-21 PROCEDURE — 94640 AIRWAY INHALATION TREATMENT: CPT

## 2018-06-21 PROCEDURE — 770006 HCHG ROOM/CARE - MED/SURG/GYN SEMI*

## 2018-06-21 PROCEDURE — 700111 HCHG RX REV CODE 636 W/ 250 OVERRIDE (IP): Performed by: SURGERY

## 2018-06-21 PROCEDURE — 700102 HCHG RX REV CODE 250 W/ 637 OVERRIDE(OP): Performed by: SURGERY

## 2018-06-21 PROCEDURE — A9270 NON-COVERED ITEM OR SERVICE: HCPCS | Performed by: NURSE PRACTITIONER

## 2018-06-21 PROCEDURE — 85007 BL SMEAR W/DIFF WBC COUNT: CPT

## 2018-06-21 PROCEDURE — 83735 ASSAY OF MAGNESIUM: CPT

## 2018-06-21 PROCEDURE — 36415 COLL VENOUS BLD VENIPUNCTURE: CPT

## 2018-06-21 PROCEDURE — 71045 X-RAY EXAM CHEST 1 VIEW: CPT

## 2018-06-21 PROCEDURE — 700102 HCHG RX REV CODE 250 W/ 637 OVERRIDE(OP): Performed by: NURSE PRACTITIONER

## 2018-06-21 RX ORDER — BUTALBITAL, ACETAMINOPHEN AND CAFFEINE 50; 325; 40 MG/1; MG/1; MG/1
1 TABLET ORAL EVERY 6 HOURS PRN
Status: DISCONTINUED | OUTPATIENT
Start: 2018-06-21 | End: 2018-06-24 | Stop reason: HOSPADM

## 2018-06-21 RX ORDER — CELECOXIB 200 MG/1
200 CAPSULE ORAL 2 TIMES DAILY
Status: DISCONTINUED | OUTPATIENT
Start: 2018-06-21 | End: 2018-06-21

## 2018-06-21 RX ORDER — CELECOXIB 200 MG/1
200 CAPSULE ORAL 2 TIMES DAILY
Status: DISCONTINUED | OUTPATIENT
Start: 2018-06-21 | End: 2018-06-24 | Stop reason: HOSPADM

## 2018-06-21 RX ORDER — ACETAMINOPHEN 500 MG
1000 TABLET ORAL EVERY 6 HOURS PRN
Status: DISCONTINUED | OUTPATIENT
Start: 2018-06-21 | End: 2018-06-24 | Stop reason: HOSPADM

## 2018-06-21 RX ORDER — IPRATROPIUM BROMIDE AND ALBUTEROL SULFATE 2.5; .5 MG/3ML; MG/3ML
3 SOLUTION RESPIRATORY (INHALATION)
Status: DISCONTINUED | OUTPATIENT
Start: 2018-06-21 | End: 2018-06-24

## 2018-06-21 RX ADMIN — OXYCODONE HYDROCHLORIDE 10 MG: 10 TABLET ORAL at 00:22

## 2018-06-21 RX ADMIN — GABAPENTIN 300 MG: 300 CAPSULE ORAL at 14:40

## 2018-06-21 RX ADMIN — IPRATROPIUM BROMIDE AND ALBUTEROL SULFATE 3 ML: .5; 3 SOLUTION RESPIRATORY (INHALATION) at 23:12

## 2018-06-21 RX ADMIN — GABAPENTIN 300 MG: 300 CAPSULE ORAL at 08:11

## 2018-06-21 RX ADMIN — METHOCARBAMOL 750 MG: 750 TABLET ORAL at 08:11

## 2018-06-21 RX ADMIN — ACETAMINOPHEN 1000 MG: 500 TABLET ORAL at 06:00

## 2018-06-21 RX ADMIN — IPRATROPIUM BROMIDE AND ALBUTEROL SULFATE 3 ML: .5; 3 SOLUTION RESPIRATORY (INHALATION) at 17:01

## 2018-06-21 RX ADMIN — OXYCODONE HYDROCHLORIDE 10 MG: 10 TABLET ORAL at 03:57

## 2018-06-21 RX ADMIN — OXYCODONE HYDROCHLORIDE 10 MG: 10 TABLET ORAL at 14:40

## 2018-06-21 RX ADMIN — OXYCODONE HYDROCHLORIDE 10 MG: 10 TABLET ORAL at 11:32

## 2018-06-21 RX ADMIN — ENOXAPARIN SODIUM 30 MG: 100 INJECTION SUBCUTANEOUS at 21:55

## 2018-06-21 RX ADMIN — ENOXAPARIN SODIUM 30 MG: 100 INJECTION SUBCUTANEOUS at 08:10

## 2018-06-21 RX ADMIN — ACETAMINOPHEN 1000 MG: 500 TABLET ORAL at 00:22

## 2018-06-21 RX ADMIN — GABAPENTIN 300 MG: 300 CAPSULE ORAL at 22:05

## 2018-06-21 RX ADMIN — OXYCODONE HYDROCHLORIDE 10 MG: 10 TABLET ORAL at 17:51

## 2018-06-21 RX ADMIN — METHOCARBAMOL 750 MG: 750 TABLET ORAL at 13:27

## 2018-06-21 RX ADMIN — MORPHINE SULFATE 15 MG: 15 TABLET, EXTENDED RELEASE ORAL at 21:55

## 2018-06-21 RX ADMIN — METHOCARBAMOL 750 MG: 750 TABLET ORAL at 16:47

## 2018-06-21 RX ADMIN — LOVASTATIN 20 MG: 20 TABLET ORAL at 21:55

## 2018-06-21 RX ADMIN — OXYCODONE HYDROCHLORIDE 10 MG: 10 TABLET ORAL at 21:55

## 2018-06-21 RX ADMIN — CELECOXIB 200 MG: 200 CAPSULE ORAL at 16:47

## 2018-06-21 RX ADMIN — LIDOCAINE 2 PATCH: 50 PATCH TOPICAL at 17:51

## 2018-06-21 RX ADMIN — IPRATROPIUM BROMIDE AND ALBUTEROL SULFATE 3 ML: .5; 3 SOLUTION RESPIRATORY (INHALATION) at 09:29

## 2018-06-21 RX ADMIN — MORPHINE SULFATE 15 MG: 15 TABLET, EXTENDED RELEASE ORAL at 08:11

## 2018-06-21 RX ADMIN — METHOCARBAMOL 750 MG: 750 TABLET ORAL at 21:55

## 2018-06-21 ASSESSMENT — ENCOUNTER SYMPTOMS
HEADACHES: 1
CONSTITUTIONAL NEGATIVE: 1
SHORTNESS OF BREATH: 1
GASTROINTESTINAL NEGATIVE: 1
COUGH: 1
EYES NEGATIVE: 1
MYALGIAS: 1
CARDIOVASCULAR NEGATIVE: 1
SPUTUM PRODUCTION: 1
ROS GI COMMENTS: BM 6/20
PSYCHIATRIC NEGATIVE: 1

## 2018-06-21 ASSESSMENT — PAIN SCALES - GENERAL
PAINLEVEL_OUTOF10: 8
PAINLEVEL_OUTOF10: 8
PAINLEVEL_OUTOF10: 5
PAINLEVEL_OUTOF10: 7
PAINLEVEL_OUTOF10: 5

## 2018-06-21 NOTE — PROGRESS NOTES
Pt aox4. No visible signs of distress. VSS.  Tolerating medication regimen without any signs or symptoms of adverse reactions.  Pt reports elevated L flank pain, multiple PRN analgesics administered.  No complaints of n/v. Tolerating diet.  On room air. Wheezing throughout. Respiratory notified x1 this shift.  Bed locked and in low position.  Will continue to monitor.

## 2018-06-21 NOTE — PROGRESS NOTES
Pt is A&O x's 4, VSS, pt on RA with O2 saturations WNL, pt pulling 2000 on IS. Pt has chest pain to the L chest wall at an 8/10,pillow given for splinting purposes. Pt having loose BM's, stool softeners held, pt tolerating diet at this time, denies N/V. Pt more fatigued than yesterday, was febrile and tachy this am but has resolved. Will continue to monitior labs and vitals. POC discussed, all questions and concerns have been discussed. Bed is in locked/lowest position call light and personal items within reach. Pt educated on the use of the call, pt calls appropriately.

## 2018-06-21 NOTE — CARE PLAN
Problem: Pain Management  Goal: Pain level will decrease to patient's comfort goal  Outcome: PROGRESSING AS EXPECTED  Scheduled/ PRN pain medication on MAR. Pain well controlled with oral pain meds per pt. Ice and heat packs offered. Extra pillows for splinting utilized      Problem: Respiratory:  Goal: Respiratory status will improve  Outcome: PROGRESSING AS EXPECTED  Pt has coarse lung sounds, pt utilizing IS, pulling 2000. Pt out of bed for meals, ambulating frequently,deep breathing and ambulation encouraged.
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
## 2018-06-22 ENCOUNTER — APPOINTMENT (OUTPATIENT)
Dept: RADIOLOGY | Facility: MEDICAL CENTER | Age: 48
DRG: 200 | End: 2018-06-22
Attending: SURGERY
Payer: OTHER MISCELLANEOUS

## 2018-06-22 LAB
ANION GAP SERPL CALC-SCNC: 5 MMOL/L (ref 0–11.9)
BASOPHILS # BLD AUTO: 0.7 % (ref 0–1.8)
BASOPHILS # BLD: 0.03 K/UL (ref 0–0.12)
BUN SERPL-MCNC: 11 MG/DL (ref 8–22)
CALCIUM SERPL-MCNC: 8.5 MG/DL (ref 8.5–10.5)
CHLORIDE SERPL-SCNC: 106 MMOL/L (ref 96–112)
CO2 SERPL-SCNC: 26 MMOL/L (ref 20–33)
CREAT SERPL-MCNC: 1.01 MG/DL (ref 0.5–1.4)
EOSINOPHIL # BLD AUTO: 0.09 K/UL (ref 0–0.51)
EOSINOPHIL NFR BLD: 2 % (ref 0–6.9)
ERYTHROCYTE [DISTWIDTH] IN BLOOD BY AUTOMATED COUNT: 40.8 FL (ref 35.9–50)
GLUCOSE SERPL-MCNC: 133 MG/DL (ref 65–99)
HCT VFR BLD AUTO: 35.5 % (ref 42–52)
HGB BLD-MCNC: 11.7 G/DL (ref 14–18)
IMM GRANULOCYTES # BLD AUTO: 0.07 K/UL (ref 0–0.11)
IMM GRANULOCYTES NFR BLD AUTO: 1.5 % (ref 0–0.9)
LYMPHOCYTES # BLD AUTO: 1.32 K/UL (ref 1–4.8)
LYMPHOCYTES NFR BLD: 29.2 % (ref 22–41)
MCH RBC QN AUTO: 28.8 PG (ref 27–33)
MCHC RBC AUTO-ENTMCNC: 33 G/DL (ref 33.7–35.3)
MCV RBC AUTO: 87.4 FL (ref 81.4–97.8)
MONOCYTES # BLD AUTO: 0.88 K/UL (ref 0–0.85)
MONOCYTES NFR BLD AUTO: 19.5 % (ref 0–13.4)
NEUTROPHILS # BLD AUTO: 2.13 K/UL (ref 1.82–7.42)
NEUTROPHILS NFR BLD: 47.1 % (ref 44–72)
NRBC # BLD AUTO: 0 K/UL
NRBC BLD-RTO: 0 /100 WBC
PLATELET # BLD AUTO: 169 K/UL (ref 164–446)
PMV BLD AUTO: 10.4 FL (ref 9–12.9)
POTASSIUM SERPL-SCNC: 4.1 MMOL/L (ref 3.6–5.5)
RBC # BLD AUTO: 4.06 M/UL (ref 4.7–6.1)
SODIUM SERPL-SCNC: 137 MMOL/L (ref 135–145)
WBC # BLD AUTO: 4.5 K/UL (ref 4.8–10.8)

## 2018-06-22 PROCEDURE — 94640 AIRWAY INHALATION TREATMENT: CPT

## 2018-06-22 PROCEDURE — 770006 HCHG ROOM/CARE - MED/SURG/GYN SEMI*

## 2018-06-22 PROCEDURE — A9270 NON-COVERED ITEM OR SERVICE: HCPCS | Performed by: SURGERY

## 2018-06-22 PROCEDURE — 700102 HCHG RX REV CODE 250 W/ 637 OVERRIDE(OP): Performed by: NURSE PRACTITIONER

## 2018-06-22 PROCEDURE — 36415 COLL VENOUS BLD VENIPUNCTURE: CPT

## 2018-06-22 PROCEDURE — 94760 N-INVAS EAR/PLS OXIMETRY 1: CPT

## 2018-06-22 PROCEDURE — 94668 MNPJ CHEST WALL SBSQ: CPT

## 2018-06-22 PROCEDURE — A9270 NON-COVERED ITEM OR SERVICE: HCPCS | Performed by: NURSE PRACTITIONER

## 2018-06-22 PROCEDURE — 71045 X-RAY EXAM CHEST 1 VIEW: CPT

## 2018-06-22 PROCEDURE — 700111 HCHG RX REV CODE 636 W/ 250 OVERRIDE (IP): Performed by: SURGERY

## 2018-06-22 PROCEDURE — 700101 HCHG RX REV CODE 250: Performed by: SURGERY

## 2018-06-22 PROCEDURE — 80048 BASIC METABOLIC PNL TOTAL CA: CPT

## 2018-06-22 PROCEDURE — 700102 HCHG RX REV CODE 250 W/ 637 OVERRIDE(OP): Performed by: SURGERY

## 2018-06-22 PROCEDURE — 85025 COMPLETE CBC W/AUTO DIFF WBC: CPT

## 2018-06-22 RX ORDER — MORPHINE SULFATE 15 MG/1
30 TABLET, FILM COATED, EXTENDED RELEASE ORAL EVERY 12 HOURS
Status: DISCONTINUED | OUTPATIENT
Start: 2018-06-22 | End: 2018-06-24 | Stop reason: HOSPADM

## 2018-06-22 RX ORDER — BUDESONIDE AND FORMOTEROL FUMARATE DIHYDRATE 80; 4.5 UG/1; UG/1
2 AEROSOL RESPIRATORY (INHALATION)
Status: DISCONTINUED | OUTPATIENT
Start: 2018-06-22 | End: 2018-06-24

## 2018-06-22 RX ORDER — MORPHINE SULFATE 15 MG/1
15 TABLET, FILM COATED, EXTENDED RELEASE ORAL ONCE
Status: COMPLETED | OUTPATIENT
Start: 2018-06-22 | End: 2018-06-22

## 2018-06-22 RX ADMIN — IPRATROPIUM BROMIDE AND ALBUTEROL SULFATE 3 ML: .5; 3 SOLUTION RESPIRATORY (INHALATION) at 08:53

## 2018-06-22 RX ADMIN — MORPHINE SULFATE 15 MG: 15 TABLET, EXTENDED RELEASE ORAL at 08:01

## 2018-06-22 RX ADMIN — GABAPENTIN 300 MG: 300 CAPSULE ORAL at 14:37

## 2018-06-22 RX ADMIN — BUDESONIDE AND FORMOTEROL FUMARATE DIHYDRATE 2 PUFF: 80; 4.5 AEROSOL RESPIRATORY (INHALATION) at 10:11

## 2018-06-22 RX ADMIN — METHOCARBAMOL 750 MG: 750 TABLET ORAL at 12:21

## 2018-06-22 RX ADMIN — IPRATROPIUM BROMIDE AND ALBUTEROL SULFATE 3 ML: .5; 3 SOLUTION RESPIRATORY (INHALATION) at 19:51

## 2018-06-22 RX ADMIN — ENOXAPARIN SODIUM 30 MG: 100 INJECTION SUBCUTANEOUS at 08:01

## 2018-06-22 RX ADMIN — IPRATROPIUM BROMIDE AND ALBUTEROL SULFATE 3 ML: .5; 3 SOLUTION RESPIRATORY (INHALATION) at 14:23

## 2018-06-22 RX ADMIN — LOVASTATIN 20 MG: 20 TABLET ORAL at 21:54

## 2018-06-22 RX ADMIN — MORPHINE SULFATE 15 MG: 15 TABLET, FILM COATED, EXTENDED RELEASE ORAL at 09:10

## 2018-06-22 RX ADMIN — METHOCARBAMOL 750 MG: 750 TABLET ORAL at 17:34

## 2018-06-22 RX ADMIN — GABAPENTIN 300 MG: 300 CAPSULE ORAL at 08:01

## 2018-06-22 RX ADMIN — MORPHINE SULFATE 30 MG: 15 TABLET, FILM COATED, EXTENDED RELEASE ORAL at 21:49

## 2018-06-22 RX ADMIN — CELECOXIB 200 MG: 200 CAPSULE ORAL at 21:54

## 2018-06-22 RX ADMIN — METHOCARBAMOL 750 MG: 750 TABLET ORAL at 08:00

## 2018-06-22 RX ADMIN — OXYCODONE HYDROCHLORIDE 10 MG: 10 TABLET ORAL at 17:35

## 2018-06-22 RX ADMIN — OXYCODONE HYDROCHLORIDE 10 MG: 10 TABLET ORAL at 10:10

## 2018-06-22 RX ADMIN — CELECOXIB 200 MG: 200 CAPSULE ORAL at 08:00

## 2018-06-22 RX ADMIN — LIDOCAINE 2 PATCH: 50 PATCH TOPICAL at 19:04

## 2018-06-22 RX ADMIN — BUDESONIDE AND FORMOTEROL FUMARATE DIHYDRATE 2 PUFF: 80; 4.5 AEROSOL RESPIRATORY (INHALATION) at 19:54

## 2018-06-22 RX ADMIN — OXYCODONE HYDROCHLORIDE 10 MG: 10 TABLET ORAL at 06:09

## 2018-06-22 RX ADMIN — METHOCARBAMOL 750 MG: 750 TABLET ORAL at 21:49

## 2018-06-22 RX ADMIN — ENOXAPARIN SODIUM 30 MG: 100 INJECTION SUBCUTANEOUS at 21:49

## 2018-06-22 RX ADMIN — GABAPENTIN 300 MG: 300 CAPSULE ORAL at 21:49

## 2018-06-22 RX ADMIN — OXYCODONE HYDROCHLORIDE 10 MG: 10 TABLET ORAL at 14:39

## 2018-06-22 ASSESSMENT — PAIN SCALES - GENERAL
PAINLEVEL_OUTOF10: 4
PAINLEVEL_OUTOF10: 5
PAINLEVEL_OUTOF10: 5
PAINLEVEL_OUTOF10: 4
PAINLEVEL_OUTOF10: 8
PAINLEVEL_OUTOF10: 4
PAINLEVEL_OUTOF10: 4
PAINLEVEL_OUTOF10: 8

## 2018-06-22 ASSESSMENT — ENCOUNTER SYMPTOMS
HEADACHES: 0
MYALGIAS: 1
WHEEZING: 1
PSYCHIATRIC NEGATIVE: 1
CONSTITUTIONAL NEGATIVE: 1
SPUTUM PRODUCTION: 1
GASTROINTESTINAL NEGATIVE: 1
COUGH: 1
SHORTNESS OF BREATH: 1
ROS GI COMMENTS: BM 6/21
CARDIOVASCULAR NEGATIVE: 1
EYES NEGATIVE: 1

## 2018-06-22 NOTE — CARE PLAN
Problem: Pain Management  Goal: Pain level will decrease to patient's comfort goal  Outcome: PROGRESSING SLOWER THAN EXPECTED  Scheduled/ PRN pain medication on MAR. Pain not well controlled with oral pain meds per pt. Ice and heat packs offered. Extra pillows for splinting utilized      Problem: Respiratory:  Goal: Respiratory status will improve  Outcome: PROGRESSING SLOWER THAN EXPECTED  Pt has exp/ins wheezing, pt utilizing IS, pulling 2000. Pt out of bed for meals, ambulating frequently,deep breathing and ambulation encouraged.
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
## 2018-06-22 NOTE — PROGRESS NOTES
Pt is A&O x's 4, VSS, pt on RA with O2 saturations WNL, pt pulling 2000 on IS. Pt has chest pain to the L chest wall at an 8/10, antiinflamitories added to POC, pillow given for splinting purposes. Pt having + BM's, stool softeners held, pt tolerating diet at this time, denies N/V.  POC discussed, all questions and concerns have been discussed. Bed is in locked/lowest position call light and personal items within reach. Pt educated on the use of the call, pt calls appropriately.

## 2018-06-22 NOTE — PROGRESS NOTES
Assumed care of Mr. Hassan at 1900.  Pt alert and oriented x4.   Pain rated at a 5/10, declined pain intervention. Pain medication schedule and non-pharmacologic interventions discussed.  Denies nausea.   Lung sounds coarse with diminished bases. RT contacted for PRN breathing treatment. Pt on continuous pulse oximetry with O2 saturation in the high 90's on 2 L NC.  Bowel sounds audible and active in all quadrants, no tenderness. Pt refuses any stool softeners/laxatives.  Skin intact with bruising to left flank and around lower abdomen. Small abrasion on left leg, pt refuses bacitracin ointment.   Pt up independently and encouraged to call for assistance if needed.  Care plan discussed with patient.  Call light within reach.  All needs met at this time.

## 2018-06-22 NOTE — CARE PLAN
Problem: Pain Management  Goal: Pain level will decrease to patient's comfort goal  Outcome: PROGRESSING AS EXPECTED  Scheduled and PRN pain medication schedule discussed with patient as well as non-pharmacologic interventions.     Problem: Infection  Goal: Will remain free from infection  Outcome: PROGRESSING AS EXPECTED  Monitoring lab work, vital signs, chest x rays, and clinical symptoms for signs and symptoms of infection.

## 2018-06-23 ENCOUNTER — APPOINTMENT (OUTPATIENT)
Dept: RADIOLOGY | Facility: MEDICAL CENTER | Age: 48
DRG: 200 | End: 2018-06-23
Attending: SURGERY
Payer: OTHER MISCELLANEOUS

## 2018-06-23 LAB
ANION GAP SERPL CALC-SCNC: 8 MMOL/L (ref 0–11.9)
BASOPHILS # BLD AUTO: 0.9 % (ref 0–1.8)
BASOPHILS # BLD: 0.05 K/UL (ref 0–0.12)
BUN SERPL-MCNC: 12 MG/DL (ref 8–22)
CALCIUM SERPL-MCNC: 8.9 MG/DL (ref 8.5–10.5)
CHLORIDE SERPL-SCNC: 103 MMOL/L (ref 96–112)
CO2 SERPL-SCNC: 27 MMOL/L (ref 20–33)
CREAT SERPL-MCNC: 0.81 MG/DL (ref 0.5–1.4)
EOSINOPHIL # BLD AUTO: 0.05 K/UL (ref 0–0.51)
EOSINOPHIL NFR BLD: 0.9 % (ref 0–6.9)
ERYTHROCYTE [DISTWIDTH] IN BLOOD BY AUTOMATED COUNT: 41.1 FL (ref 35.9–50)
GLUCOSE SERPL-MCNC: 107 MG/DL (ref 65–99)
HCT VFR BLD AUTO: 36 % (ref 42–52)
HGB BLD-MCNC: 11.8 G/DL (ref 14–18)
LYMPHOCYTES # BLD AUTO: 1.57 K/UL (ref 1–4.8)
LYMPHOCYTES NFR BLD: 30.1 % (ref 22–41)
MANUAL DIFF BLD: NORMAL
MCH RBC QN AUTO: 28.6 PG (ref 27–33)
MCHC RBC AUTO-ENTMCNC: 32.8 G/DL (ref 33.7–35.3)
MCV RBC AUTO: 87.2 FL (ref 81.4–97.8)
MONOCYTES # BLD AUTO: 0.55 K/UL (ref 0–0.85)
MONOCYTES NFR BLD AUTO: 10.6 % (ref 0–13.4)
MORPHOLOGY BLD-IMP: NORMAL
NEUTROPHILS # BLD AUTO: 2.99 K/UL (ref 1.82–7.42)
NEUTROPHILS NFR BLD: 57.5 % (ref 44–72)
NRBC # BLD AUTO: 0 K/UL
NRBC BLD-RTO: 0 /100 WBC
PLATELET # BLD AUTO: 177 K/UL (ref 164–446)
PLATELET BLD QL SMEAR: NORMAL
PMV BLD AUTO: 10.3 FL (ref 9–12.9)
POTASSIUM SERPL-SCNC: 4.3 MMOL/L (ref 3.6–5.5)
RBC # BLD AUTO: 4.13 M/UL (ref 4.7–6.1)
RBC BLD AUTO: NORMAL
SODIUM SERPL-SCNC: 138 MMOL/L (ref 135–145)
WBC # BLD AUTO: 5.2 K/UL (ref 4.8–10.8)

## 2018-06-23 PROCEDURE — 770006 HCHG ROOM/CARE - MED/SURG/GYN SEMI*

## 2018-06-23 PROCEDURE — 80048 BASIC METABOLIC PNL TOTAL CA: CPT

## 2018-06-23 PROCEDURE — 94668 MNPJ CHEST WALL SBSQ: CPT

## 2018-06-23 PROCEDURE — 85007 BL SMEAR W/DIFF WBC COUNT: CPT

## 2018-06-23 PROCEDURE — 700102 HCHG RX REV CODE 250 W/ 637 OVERRIDE(OP): Performed by: NURSE PRACTITIONER

## 2018-06-23 PROCEDURE — 36415 COLL VENOUS BLD VENIPUNCTURE: CPT

## 2018-06-23 PROCEDURE — A9270 NON-COVERED ITEM OR SERVICE: HCPCS | Performed by: SURGERY

## 2018-06-23 PROCEDURE — 700102 HCHG RX REV CODE 250 W/ 637 OVERRIDE(OP): Performed by: SURGERY

## 2018-06-23 PROCEDURE — 85027 COMPLETE CBC AUTOMATED: CPT

## 2018-06-23 PROCEDURE — 94640 AIRWAY INHALATION TREATMENT: CPT

## 2018-06-23 PROCEDURE — 700101 HCHG RX REV CODE 250: Performed by: SURGERY

## 2018-06-23 PROCEDURE — 71045 X-RAY EXAM CHEST 1 VIEW: CPT

## 2018-06-23 PROCEDURE — A9270 NON-COVERED ITEM OR SERVICE: HCPCS | Performed by: NURSE PRACTITIONER

## 2018-06-23 PROCEDURE — 700111 HCHG RX REV CODE 636 W/ 250 OVERRIDE (IP): Performed by: SURGERY

## 2018-06-23 RX ADMIN — METHOCARBAMOL 750 MG: 750 TABLET ORAL at 16:32

## 2018-06-23 RX ADMIN — OXYCODONE HYDROCHLORIDE 5 MG: 5 TABLET ORAL at 03:57

## 2018-06-23 RX ADMIN — OXYCODONE HYDROCHLORIDE 10 MG: 10 TABLET ORAL at 16:32

## 2018-06-23 RX ADMIN — METHOCARBAMOL 750 MG: 750 TABLET ORAL at 13:05

## 2018-06-23 RX ADMIN — OXYCODONE HYDROCHLORIDE 10 MG: 10 TABLET ORAL at 13:05

## 2018-06-23 RX ADMIN — GABAPENTIN 300 MG: 300 CAPSULE ORAL at 21:15

## 2018-06-23 RX ADMIN — GABAPENTIN 300 MG: 300 CAPSULE ORAL at 08:09

## 2018-06-23 RX ADMIN — MORPHINE SULFATE 30 MG: 15 TABLET, FILM COATED, EXTENDED RELEASE ORAL at 08:09

## 2018-06-23 RX ADMIN — GABAPENTIN 300 MG: 300 CAPSULE ORAL at 16:32

## 2018-06-23 RX ADMIN — LOVASTATIN 20 MG: 20 TABLET ORAL at 21:15

## 2018-06-23 RX ADMIN — METHOCARBAMOL 750 MG: 750 TABLET ORAL at 08:10

## 2018-06-23 RX ADMIN — BUDESONIDE AND FORMOTEROL FUMARATE DIHYDRATE 2 PUFF: 80; 4.5 AEROSOL RESPIRATORY (INHALATION) at 08:17

## 2018-06-23 RX ADMIN — MORPHINE SULFATE 30 MG: 15 TABLET, FILM COATED, EXTENDED RELEASE ORAL at 21:15

## 2018-06-23 RX ADMIN — IPRATROPIUM BROMIDE AND ALBUTEROL SULFATE 3 ML: .5; 3 SOLUTION RESPIRATORY (INHALATION) at 19:36

## 2018-06-23 RX ADMIN — BUDESONIDE AND FORMOTEROL FUMARATE DIHYDRATE 2 PUFF: 80; 4.5 AEROSOL RESPIRATORY (INHALATION) at 19:36

## 2018-06-23 RX ADMIN — CELECOXIB 200 MG: 200 CAPSULE ORAL at 08:10

## 2018-06-23 RX ADMIN — LIDOCAINE 2 PATCH: 50 PATCH TOPICAL at 16:33

## 2018-06-23 RX ADMIN — IPRATROPIUM BROMIDE AND ALBUTEROL SULFATE 3 ML: .5; 3 SOLUTION RESPIRATORY (INHALATION) at 08:29

## 2018-06-23 RX ADMIN — METHOCARBAMOL 750 MG: 750 TABLET ORAL at 21:15

## 2018-06-23 RX ADMIN — ENOXAPARIN SODIUM 30 MG: 100 INJECTION SUBCUTANEOUS at 21:15

## 2018-06-23 RX ADMIN — CELECOXIB 200 MG: 200 CAPSULE ORAL at 21:15

## 2018-06-23 RX ADMIN — ENOXAPARIN SODIUM 30 MG: 100 INJECTION SUBCUTANEOUS at 08:10

## 2018-06-23 ASSESSMENT — ENCOUNTER SYMPTOMS
SHORTNESS OF BREATH: 1
EYES NEGATIVE: 1
CARDIOVASCULAR NEGATIVE: 1
SPUTUM PRODUCTION: 1
GASTROINTESTINAL NEGATIVE: 1
COUGH: 1
ROS GI COMMENTS: BM 6/22
PSYCHIATRIC NEGATIVE: 1
HEADACHES: 0
CONSTITUTIONAL NEGATIVE: 1
WHEEZING: 1
MYALGIAS: 1

## 2018-06-23 ASSESSMENT — PAIN SCALES - GENERAL
PAINLEVEL_OUTOF10: 4
PAINLEVEL_OUTOF10: 5
PAINLEVEL_OUTOF10: 4
PAINLEVEL_OUTOF10: 5
PAINLEVEL_OUTOF10: 4
PAINLEVEL_OUTOF10: 4

## 2018-06-23 ASSESSMENT — LIFESTYLE VARIABLES: EVER_SMOKED: YES

## 2018-06-23 ASSESSMENT — PATIENT HEALTH QUESTIONNAIRE - PHQ9
SUM OF ALL RESPONSES TO PHQ9 QUESTIONS 1 AND 2: 0
2. FEELING DOWN, DEPRESSED, IRRITABLE, OR HOPELESS: NOT AT ALL
1. LITTLE INTEREST OR PLEASURE IN DOING THINGS: NOT AT ALL
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
## 2018-06-23 NOTE — CARE PLAN
Problem: Venous Thromboembolism (VTW)/Deep Vein Thrombosis (DVT) Prevention:  Goal: Patient will participate in Venous Thrombosis (VTE)/Deep Vein Thrombosis (DVT)Prevention Measures  Outcome: PROGRESSING AS EXPECTED  Pt receiving lovenox for VTE and ambulating multiple times a shift

## 2018-06-23 NOTE — PROGRESS NOTES
Assumed care of patient at 1900.    Pt is A&O x4.  Pain 4/10 and improved. Pt states new pain regimen has helped keep his pain at a more tolerable level.  Nausea denied  Tolerating regular diet  Skin intact with bruising to left flank and across lower abdomen  Voiding without difficulty, +gas, + BM  Up Self and ambulates independently  SCD's refused, pt on lovenox  Bed in lowest position and locked.  Pt resting comfortably now.  Review plan of care with patient  Call light within reach  Hourly rounds in place  All needs met at this time

## 2018-06-24 ENCOUNTER — APPOINTMENT (OUTPATIENT)
Dept: RADIOLOGY | Facility: MEDICAL CENTER | Age: 48
DRG: 200 | End: 2018-06-24
Attending: SURGERY
Payer: OTHER MISCELLANEOUS

## 2018-06-24 VITALS
HEIGHT: 74 IN | OXYGEN SATURATION: 91 % | HEART RATE: 76 BPM | TEMPERATURE: 97.1 F | BODY MASS INDEX: 31.09 KG/M2 | DIASTOLIC BLOOD PRESSURE: 67 MMHG | RESPIRATION RATE: 18 BRPM | SYSTOLIC BLOOD PRESSURE: 99 MMHG | WEIGHT: 242.29 LBS

## 2018-06-24 LAB
ANION GAP SERPL CALC-SCNC: 6 MMOL/L (ref 0–11.9)
BASOPHILS # BLD AUTO: 0.5 % (ref 0–1.8)
BASOPHILS # BLD: 0.03 K/UL (ref 0–0.12)
BUN SERPL-MCNC: 14 MG/DL (ref 8–22)
CALCIUM SERPL-MCNC: 8.7 MG/DL (ref 8.5–10.5)
CHLORIDE SERPL-SCNC: 105 MMOL/L (ref 96–112)
CO2 SERPL-SCNC: 27 MMOL/L (ref 20–33)
CREAT SERPL-MCNC: 0.81 MG/DL (ref 0.5–1.4)
EOSINOPHIL # BLD AUTO: 0.17 K/UL (ref 0–0.51)
EOSINOPHIL NFR BLD: 3.1 % (ref 0–6.9)
ERYTHROCYTE [DISTWIDTH] IN BLOOD BY AUTOMATED COUNT: 41.4 FL (ref 35.9–50)
GLUCOSE SERPL-MCNC: 107 MG/DL (ref 65–99)
HCT VFR BLD AUTO: 36 % (ref 42–52)
HGB BLD-MCNC: 11.8 G/DL (ref 14–18)
IMM GRANULOCYTES # BLD AUTO: 0.06 K/UL (ref 0–0.11)
IMM GRANULOCYTES NFR BLD AUTO: 1.1 % (ref 0–0.9)
LYMPHOCYTES # BLD AUTO: 2.05 K/UL (ref 1–4.8)
LYMPHOCYTES NFR BLD: 37.5 % (ref 22–41)
MCH RBC QN AUTO: 28.6 PG (ref 27–33)
MCHC RBC AUTO-ENTMCNC: 32.8 G/DL (ref 33.7–35.3)
MCV RBC AUTO: 87.4 FL (ref 81.4–97.8)
MONOCYTES # BLD AUTO: 0.84 K/UL (ref 0–0.85)
MONOCYTES NFR BLD AUTO: 15.4 % (ref 0–13.4)
NEUTROPHILS # BLD AUTO: 2.31 K/UL (ref 1.82–7.42)
NEUTROPHILS NFR BLD: 42.4 % (ref 44–72)
NRBC # BLD AUTO: 0 K/UL
NRBC BLD-RTO: 0 /100 WBC
PLATELET # BLD AUTO: 200 K/UL (ref 164–446)
PMV BLD AUTO: 10.3 FL (ref 9–12.9)
POTASSIUM SERPL-SCNC: 4.7 MMOL/L (ref 3.6–5.5)
RBC # BLD AUTO: 4.12 M/UL (ref 4.7–6.1)
SODIUM SERPL-SCNC: 138 MMOL/L (ref 135–145)
WBC # BLD AUTO: 5.5 K/UL (ref 4.8–10.8)

## 2018-06-24 PROCEDURE — 36415 COLL VENOUS BLD VENIPUNCTURE: CPT

## 2018-06-24 PROCEDURE — 700102 HCHG RX REV CODE 250 W/ 637 OVERRIDE(OP): Performed by: NURSE PRACTITIONER

## 2018-06-24 PROCEDURE — 94668 MNPJ CHEST WALL SBSQ: CPT

## 2018-06-24 PROCEDURE — 94640 AIRWAY INHALATION TREATMENT: CPT

## 2018-06-24 PROCEDURE — A9270 NON-COVERED ITEM OR SERVICE: HCPCS | Performed by: NURSE PRACTITIONER

## 2018-06-24 PROCEDURE — 700102 HCHG RX REV CODE 250 W/ 637 OVERRIDE(OP): Performed by: SURGERY

## 2018-06-24 PROCEDURE — 71045 X-RAY EXAM CHEST 1 VIEW: CPT

## 2018-06-24 PROCEDURE — 85025 COMPLETE CBC W/AUTO DIFF WBC: CPT

## 2018-06-24 PROCEDURE — A9270 NON-COVERED ITEM OR SERVICE: HCPCS | Performed by: SURGERY

## 2018-06-24 PROCEDURE — 80048 BASIC METABOLIC PNL TOTAL CA: CPT

## 2018-06-24 PROCEDURE — 700111 HCHG RX REV CODE 636 W/ 250 OVERRIDE (IP): Performed by: SURGERY

## 2018-06-24 PROCEDURE — 700101 HCHG RX REV CODE 250: Performed by: SURGERY

## 2018-06-24 RX ORDER — METHOCARBAMOL 750 MG/1
750 TABLET, FILM COATED ORAL 4 TIMES DAILY PRN
Qty: 60 TAB | Refills: 0 | Status: SHIPPED | OUTPATIENT
Start: 2018-06-24 | End: 2018-08-29

## 2018-06-24 RX ORDER — BUDESONIDE AND FORMOTEROL FUMARATE DIHYDRATE 80; 4.5 UG/1; UG/1
2 AEROSOL RESPIRATORY (INHALATION) 2 TIMES DAILY
Status: DISCONTINUED | OUTPATIENT
Start: 2018-06-24 | End: 2018-06-24 | Stop reason: HOSPADM

## 2018-06-24 RX ORDER — OXYCODONE HYDROCHLORIDE 5 MG/1
5 TABLET ORAL EVERY 4 HOURS PRN
Qty: 42 TAB | Refills: 0 | Status: SHIPPED | OUTPATIENT
Start: 2018-06-24 | End: 2018-07-01

## 2018-06-24 RX ORDER — IPRATROPIUM BROMIDE AND ALBUTEROL SULFATE 2.5; .5 MG/3ML; MG/3ML
3 SOLUTION RESPIRATORY (INHALATION)
Status: DISCONTINUED | OUTPATIENT
Start: 2018-06-24 | End: 2018-06-24 | Stop reason: HOSPADM

## 2018-06-24 RX ORDER — ALBUTEROL SULFATE 90 UG/1
2 AEROSOL, METERED RESPIRATORY (INHALATION) EVERY 4 HOURS PRN
Qty: 8.5 G | Refills: 0 | Status: SHIPPED | OUTPATIENT
Start: 2018-06-24 | End: 2020-08-05

## 2018-06-24 RX ORDER — IPRATROPIUM BROMIDE AND ALBUTEROL SULFATE 2.5; .5 MG/3ML; MG/3ML
3 SOLUTION RESPIRATORY (INHALATION) EVERY 6 HOURS PRN
Qty: 30 BULLET | Refills: 0 | Status: SHIPPED | OUTPATIENT
Start: 2018-06-24 | End: 2020-08-05 | Stop reason: SDUPTHER

## 2018-06-24 RX ORDER — MORPHINE SULFATE 30 MG/1
30 TABLET, FILM COATED, EXTENDED RELEASE ORAL EVERY 12 HOURS
Qty: 14 TAB | Refills: 0 | Status: SHIPPED | OUTPATIENT
Start: 2018-06-24 | End: 2018-07-01

## 2018-06-24 RX ORDER — GABAPENTIN 300 MG/1
300 CAPSULE ORAL 3 TIMES DAILY
Qty: 90 CAP | Refills: 0 | Status: SHIPPED | OUTPATIENT
Start: 2018-06-24 | End: 2022-04-02

## 2018-06-24 RX ADMIN — GABAPENTIN 300 MG: 300 CAPSULE ORAL at 10:35

## 2018-06-24 RX ADMIN — CELECOXIB 200 MG: 200 CAPSULE ORAL at 10:40

## 2018-06-24 RX ADMIN — METHOCARBAMOL 750 MG: 750 TABLET ORAL at 13:00

## 2018-06-24 RX ADMIN — ENOXAPARIN SODIUM 30 MG: 100 INJECTION SUBCUTANEOUS at 10:36

## 2018-06-24 RX ADMIN — METHOCARBAMOL 750 MG: 750 TABLET ORAL at 09:00

## 2018-06-24 RX ADMIN — OXYCODONE HYDROCHLORIDE 5 MG: 5 TABLET ORAL at 05:50

## 2018-06-24 RX ADMIN — BUDESONIDE AND FORMOTEROL FUMARATE DIHYDRATE 2 PUFF: 80; 4.5 AEROSOL RESPIRATORY (INHALATION) at 08:32

## 2018-06-24 RX ADMIN — MORPHINE SULFATE 30 MG: 15 TABLET, FILM COATED, EXTENDED RELEASE ORAL at 10:35

## 2018-06-24 RX ADMIN — IPRATROPIUM BROMIDE AND ALBUTEROL SULFATE 3 ML: .5; 3 SOLUTION RESPIRATORY (INHALATION) at 08:32

## 2018-06-24 ASSESSMENT — ENCOUNTER SYMPTOMS
NEUROLOGICAL NEGATIVE: 1
GASTROINTESTINAL NEGATIVE: 1
MYALGIAS: 1
COUGH: 1
CONSTITUTIONAL NEGATIVE: 1
SPUTUM PRODUCTION: 1
SHORTNESS OF BREATH: 1

## 2018-06-24 ASSESSMENT — COPD QUESTIONNAIRES
COPD SCREENING SCORE: 6
DO YOU EVER COUGH UP ANY MUCUS OR PHLEGM?: YES, EVERY DAY
DURING THE PAST 4 WEEKS HOW MUCH DID YOU FEEL SHORT OF BREATH: SOME OF THE TIME
HAVE YOU SMOKED AT LEAST 100 CIGARETTES IN YOUR ENTIRE LIFE: YES

## 2018-06-24 ASSESSMENT — PAIN SCALES - GENERAL
PAINLEVEL_OUTOF10: 5
PAINLEVEL_OUTOF10: 5

## 2018-06-24 ASSESSMENT — LIFESTYLE VARIABLES: EVER_SMOKED: YES

## 2018-06-24 NOTE — PROGRESS NOTES
Report received from NOC shift RN.   A/O X 4. Room air.   Pt hypotensive this am.   Labs reviewed.   +BM. +void.   Patient ambulating independently.   PIV on right FA, SL. Flushed and patent.   Call light at bedside.   Pain level is 4/10 on left flank.
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
## 2018-06-24 NOTE — CARE PLAN
Problem: Pain Management  Goal: Pain level will decrease to patient's comfort goal  Outcome: PROGRESSING AS EXPECTED  Pain medication schedule discussed with patient.     Problem: Communication  Goal: The ability to communicate needs accurately and effectively will improve  Outcome: PROGRESSING AS EXPECTED  Call light at bedside
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
## 2018-06-24 NOTE — DISCHARGE INSTRUCTIONS
Discharge Instructions    Discharged to home by car with relative. Discharged via wheelchair, hospital escort: Yes.  Special equipment needed: Not Applicable    Be sure to schedule a follow-up appointment with your primary care doctor or any specialists as instructed.     Discharge Plan:     - Call or seek medical attention for questions or concerns   - Follow up with Dr. Barraza in 1-2 weeks time   - Follow up with primary care provider within one weeks time   - Resume regular diet   - May take over the counter acetaminophen or ibuprofen as needed for pain   - Continue daily over the counter stool softener while on narcotics   - No operation of machinery or motorized vehicles while under the influence of narcotics   - No alcohol use while under the influence of narcotics   - No swimming, hot tubs, baths or wound submersion until cleared by outpatient provider. May shower   - No contact sports, strenuous activities, or heavy lifting until cleared by outpatient provider   - If respiratory decompensation, change in condition or worsening condition, or signs or symptoms of infection occur seek medical attention   - Stop smoking   - Continue incentive spirometer, cough and deep breathing      Diet Plan: Discussed  Activity Level: Discussed  Smoking Cessation Offered: Patient Counseled  Confirmed Follow up Appointment: Patient to Call and Schedule Appointment  Confirmed Symptoms Management: Discussed  Medication Reconciliation Updated: Yes  Influenza Vaccine Indication: Indicated: 9 to 64 years of age    I understand that a diet low in cholesterol, fat, and sodium is recommended for good health. Unless I have been given specific instructions below for another diet, I accept this instruction as my diet prescription.   Other diet: Regular    Special Instructions: None    · Is patient discharged on Warfarin / Coumadin?   No       Rib Fracture  A rib fracture is a break or crack in one of the bones of the ribs. The ribs are  like a cage that goes around your upper chest. A broken or cracked rib is often painful, but most do not cause other problems. Most rib fractures heal on their own in 1-3 months.  Follow theseinstructions at home:  · Avoid activities that cause pain to the injured area. Protect your injured area.  · Slowly increase activity as told by your doctor.  · Take medicine as told by your doctor.  · Put ice on the injured area for the first 1-2 days after you have been treated or as told by your doctor.  ¨ Put ice in a plastic bag.  ¨ Place a towel between your skin and the bag.  ¨ Leave the ice on for 15-20 minutes at a time, every 2 hours while you are awake.  · Do deep breathing as told by your doctor. You may be told to:  ¨ Take deep breaths many times a day.  ¨ Cough many times a day while hugging a pillow.  ¨ Use a device (incentive spirometer) to perform deep breathing many times a day.  · Drink enough fluids to keep your pee (urine) clear or pale yellow.  · Do not wear a rib belt or binder. These do not allow you to breathe deeply.  Get help right away if:  · You have a fever.  · You have trouble breathing.  · You cannot stop coughing.  · You cough up thick or bloody spit (mucus).  · You feel sick to your stomach (nauseous), throw up (vomit), or have belly (abdominal) pain.  · Your pain gets worse and medicine does not help.  This information is not intended to replace advice given to you by your health care provider. Make sure you discuss any questions you have with your health care provider.  Document Released: 09/26/2009 Document Revised: 05/25/2017 Document Reviewed: 02/19/2014  [employer] Patient Education © 2017 [employer]      Depression / Suicide Risk    As you are [facility], it is important to learn how to keep safe from harming yourself.    Recognize the warning signs:  · Abrupt changes in personality, positive or negative- including increase in energy    · Giving away possessions  · Change in eating patterns- significant weight changes-  positive or negative  · Change in sleeping patterns- unable to sleep or sleeping all the time   · Unwillingness or inability to communicate  · Depression  · Unusual sadness, discouragement and loneliness  · Talk of wanting to die  · Neglect of personal appearance   · Rebelliousness- reckless behavior  · Withdrawal from people/activities they love  · Confusion- inability to concentrate     If you or a loved one observes any of these behaviors or has concerns about self-harm, here's what you can do:  · Talk about it- your feelings and reasons for harming yourself  · Remove any means that you might use to hurt yourself (examples: pills, rope, extension cords, firearm)  · Get professional help from the community (Mental Health, Substance Abuse, psychological counseling)  · Do not be alone:Call your Safe Contact- someone whom you trust who will be there for you.  · Call your local CRISIS HOTLINE 684-2514 or 967-356-3218  · Call your local Children's Mobile Crisis Response Team [place] (672) 754-3733 or www.[employer]  · Call the toll free National Suicide Prevention Hotlines   · National Suicide Prevention Lifeline 301-375-RDRU (4356)  · National Hope Line Network 800-SUICIDE (931-8811)

## 2018-06-24 NOTE — PROGRESS NOTES
Discharge and follow up instructions discussed with patient. PIV and ID band removed.   Discharge scripts given to patient. Wheelchair acquired for transport to vehicle.

## 2018-06-24 NOTE — PROGRESS NOTES
Assumed care of patient at 1900.    Pt is A&O x4  Pain 4/10 and improved. Pain medication schedule discussed.    Denies nausea and tolerating Regular diet.  Bruising to left flank and lower abdomen. Abrasion on LLE scabbed and absent of signs of infection.  Pt refuses any stool softeners and related medications. Refuses bacitracin.   Up Self   SCD's refused, pt on lovenox.  Bed in lowest position and locked.  Pt resting comfortably now.  Review plan of care with patient  Call light within reach, Pt calls appropriately.  All needs met at this time

## 2018-07-02 ENCOUNTER — OCCUPATIONAL MEDICINE (OUTPATIENT)
Dept: URGENT CARE | Facility: PHYSICIAN GROUP | Age: 48
End: 2018-07-02
Payer: COMMERCIAL

## 2018-07-02 VITALS
DIASTOLIC BLOOD PRESSURE: 70 MMHG | SYSTOLIC BLOOD PRESSURE: 106 MMHG | OXYGEN SATURATION: 94 % | BODY MASS INDEX: 31.44 KG/M2 | TEMPERATURE: 98.7 F | HEIGHT: 74 IN | WEIGHT: 245 LBS | RESPIRATION RATE: 16 BRPM | HEART RATE: 92 BPM

## 2018-07-02 DIAGNOSIS — S22.42XD CLOSED FRACTURE OF MULTIPLE RIBS OF LEFT SIDE WITH ROUTINE HEALING, SUBSEQUENT ENCOUNTER: ICD-10-CM

## 2018-07-02 DIAGNOSIS — T40.2X5A CONSTIPATION DUE TO OPIOID THERAPY: ICD-10-CM

## 2018-07-02 DIAGNOSIS — R20.0 BILATERAL LEG NUMBNESS: ICD-10-CM

## 2018-07-02 DIAGNOSIS — K59.03 CONSTIPATION DUE TO OPIOID THERAPY: ICD-10-CM

## 2018-07-02 PROCEDURE — 99204 OFFICE O/P NEW MOD 45 MIN: CPT | Mod: 29 | Performed by: PHYSICIAN ASSISTANT

## 2018-07-02 RX ORDER — OXYCODONE HYDROCHLORIDE AND ACETAMINOPHEN 5; 325 MG/1; MG/1
1-2 TABLET ORAL EVERY 4 HOURS PRN
COMMUNITY
End: 2018-07-02

## 2018-07-02 RX ORDER — MORPHINE SULFATE 30 MG/1
30 TABLET ORAL EVERY 4 HOURS PRN
COMMUNITY
End: 2018-07-02

## 2018-07-02 RX ORDER — POLYETHYLENE GLYCOL 3350 17 G/17G
17 POWDER, FOR SOLUTION ORAL DAILY
Qty: 1 BOTTLE | Refills: 0 | Status: SHIPPED | OUTPATIENT
Start: 2018-07-02 | End: 2020-08-05

## 2018-07-02 RX ORDER — IBUPROFEN 800 MG/1
800 TABLET ORAL EVERY 8 HOURS PRN
Qty: 90 TAB | Refills: 0 | Status: SHIPPED | OUTPATIENT
Start: 2018-07-02 | End: 2018-08-06

## 2018-07-02 RX ORDER — OXYCODONE AND ACETAMINOPHEN 10; 325 MG/1; MG/1
.5-1 TABLET ORAL EVERY 6 HOURS PRN
Qty: 32 TAB | Refills: 0 | Status: SHIPPED | OUTPATIENT
Start: 2018-07-02 | End: 2018-07-09 | Stop reason: SDUPTHER
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
## 2018-07-06 LAB
BREATH ALCOHOL COMMENT: NORMAL
POC BREATHALIZER: 0 PERCENT (ref 0–0.01)

## 2018-07-09 ENCOUNTER — OCCUPATIONAL MEDICINE (OUTPATIENT)
Dept: URGENT CARE | Facility: PHYSICIAN GROUP | Age: 48
End: 2018-07-09
Payer: COMMERCIAL

## 2018-07-09 VITALS
WEIGHT: 247 LBS | DIASTOLIC BLOOD PRESSURE: 80 MMHG | TEMPERATURE: 97.9 F | RESPIRATION RATE: 20 BRPM | BODY MASS INDEX: 31.7 KG/M2 | HEART RATE: 95 BPM | OXYGEN SATURATION: 97 % | HEIGHT: 74 IN | SYSTOLIC BLOOD PRESSURE: 120 MMHG

## 2018-07-09 DIAGNOSIS — S22.42XD CLOSED FRACTURE OF MULTIPLE RIBS OF LEFT SIDE WITH ROUTINE HEALING, SUBSEQUENT ENCOUNTER: ICD-10-CM

## 2018-07-09 PROCEDURE — 99214 OFFICE O/P EST MOD 30 MIN: CPT | Mod: 29 | Performed by: PHYSICIAN ASSISTANT

## 2018-07-09 RX ORDER — OXYCODONE AND ACETAMINOPHEN 10; 325 MG/1; MG/1
.5-1 TABLET ORAL EVERY 6 HOURS PRN
Qty: 32 TAB | Refills: 0 | Status: SHIPPED | OUTPATIENT
Start: 2018-07-09 | End: 2018-07-17

## 2018-07-09 NOTE — LETTER
[address] [address]  Phone:  [phone] - Fax:  [phone]   [facility] Progress Report and Disability Certification  Date of Service: 7/9/2018   No Show:  No  Date / Time of Next Visit:     Claim Information   Patient Name: Dayton Hassan  Claim Number:     Employer: [employer] *** Date of Injury: 6/17/2018     Insurer / TPA: Misc Workers Comp *** ID / SSN:     Occupation:  *** Diagnosis: The encounter diagnosis was Closed fracture of multiple ribs of left side with routine healing, subsequent encounter.    Medical Information   Related to Industrial Injury?   ***   Subjective Complaints:      Objective Findings:     Pre-Existing Condition(s):     Assessment:        Status:    Permanent Disability:     Plan:      Diagnostics:      Comments:       Disability Information   Status:      From:     Through:   Restrictions are:     Physical Restrictions   Sitting:    Standing:    Stooping:    Bending:      Squatting:    Walking:    Climbing:    Pushing:      Pulling:    Other:    Reaching Above Shoulder (L):   Reaching Above Shoulder (R):       Reaching Below Shoulder (L):    Reaching Below Shoulder (R):      Not to exceed Weight Limits   Carrying(hrs):   Weight Limit(lb):   Lifting(hrs):   Weight  Limit(lb):     Comments:      Repetitive Actions   Hands: i.e. Fine Manipulations from Grasping:     Feet: i.e. Operating Foot Controls:     Driving / Operate Machinery:     Physician Name: Maria Fernanda Gandara P.A.-C. Physician Signature:   e-Signature:  , Medical Director   Clinic Name / Location: [address] [address] Clinic Phone Number: Dept: [phone]   Appointment Time: 10:30 Am Visit Start Time: 9:25 AM   Check-In Time:  9:20 Am Visit Discharge Time:  ***   Original-Treating Physician or Chiropractor    Page 2-Insurer/TPA    Page 3-Employer    Page 4-Employee
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
## 2018-07-10 ENCOUNTER — TELEPHONE (OUTPATIENT)
Dept: MEDICAL GROUP | Facility: PHYSICIAN GROUP | Age: 48
End: 2018-07-10

## 2018-07-10 NOTE — TELEPHONE ENCOUNTER
MEDICATION PRIOR AUTHORIZATION NEEDED:    1. Name of Medication: Percocet denied by worker's comp insurance. Patient does have [place] Medicaid Insurance.   Cover My Meds:  Key: [id number]  Last Name: Sonya  : 1970

## 2018-07-16 ENCOUNTER — OCCUPATIONAL MEDICINE (OUTPATIENT)
Dept: URGENT CARE | Facility: PHYSICIAN GROUP | Age: 48
End: 2018-07-16
Payer: COMMERCIAL

## 2018-07-16 VITALS
OXYGEN SATURATION: 92 % | SYSTOLIC BLOOD PRESSURE: 110 MMHG | DIASTOLIC BLOOD PRESSURE: 78 MMHG | RESPIRATION RATE: 16 BRPM | TEMPERATURE: 97.9 F | HEART RATE: 92 BPM | BODY MASS INDEX: 31.18 KG/M2 | WEIGHT: 243 LBS | HEIGHT: 74 IN

## 2018-07-16 DIAGNOSIS — R06.2 WHEEZES: ICD-10-CM

## 2018-07-16 DIAGNOSIS — S22.42XD CLOSED FRACTURE OF MULTIPLE RIBS OF LEFT SIDE WITH ROUTINE HEALING, SUBSEQUENT ENCOUNTER: ICD-10-CM

## 2018-07-16 DIAGNOSIS — R06.02 SOB (SHORTNESS OF BREATH): ICD-10-CM

## 2018-07-16 PROCEDURE — 99214 OFFICE O/P EST MOD 30 MIN: CPT | Mod: 29 | Performed by: PHYSICIAN ASSISTANT

## 2018-07-16 RX ORDER — ALBUTEROL SULFATE 2.5 MG/3ML
2.5 SOLUTION RESPIRATORY (INHALATION) EVERY 4 HOURS PRN
Qty: 75 BULLET | Refills: 0 | Status: SHIPPED | OUTPATIENT
Start: 2018-07-16 | End: 2018-07-19
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
## 2018-07-19 ENCOUNTER — OFFICE VISIT (OUTPATIENT)
Dept: MEDICAL GROUP | Facility: CLINIC | Age: 48
End: 2018-07-19
Payer: MEDICAID

## 2018-07-19 VITALS
HEART RATE: 86 BPM | DIASTOLIC BLOOD PRESSURE: 82 MMHG | SYSTOLIC BLOOD PRESSURE: 110 MMHG | RESPIRATION RATE: 16 BRPM | WEIGHT: 247 LBS | OXYGEN SATURATION: 96 % | BODY MASS INDEX: 31.7 KG/M2 | HEIGHT: 74 IN | TEMPERATURE: 97.9 F

## 2018-07-19 DIAGNOSIS — J45.50 SEVERE PERSISTENT ASTHMA WITHOUT COMPLICATION: ICD-10-CM

## 2018-07-19 DIAGNOSIS — J30.89 ENVIRONMENTAL AND SEASONAL ALLERGIES: ICD-10-CM

## 2018-07-19 DIAGNOSIS — E78.2 MIXED HYPERLIPIDEMIA: ICD-10-CM

## 2018-07-19 DIAGNOSIS — Z72.0 TOBACCO ABUSE: ICD-10-CM

## 2018-07-19 DIAGNOSIS — E66.9 OBESITY (BMI 30-39.9): ICD-10-CM

## 2018-07-19 PROCEDURE — 99214 OFFICE O/P EST MOD 30 MIN: CPT | Performed by: PHYSICIAN ASSISTANT

## 2018-07-19 RX ORDER — METHOCARBAMOL 750 MG/1
TABLET, FILM COATED ORAL
Refills: 0 | COMMUNITY
Start: 2018-06-24 | End: 2018-08-29

## 2018-07-19 RX ORDER — GABAPENTIN 300 MG/1
CAPSULE ORAL
Refills: 0 | COMMUNITY
Start: 2018-06-24 | End: 2020-08-05

## 2018-07-19 RX ORDER — IPRATROPIUM BROMIDE AND ALBUTEROL SULFATE 2.5; .5 MG/3ML; MG/3ML
SOLUTION RESPIRATORY (INHALATION)
Refills: 0 | COMMUNITY
Start: 2018-06-25 | End: 2018-07-19 | Stop reason: SDUPTHER

## 2018-07-19 RX ORDER — CETIRIZINE HYDROCHLORIDE 10 MG/1
10 TABLET ORAL DAILY
Qty: 90 TAB | Refills: 3 | Status: SHIPPED | OUTPATIENT
Start: 2018-07-19 | End: 2020-08-05

## 2018-07-19 RX ORDER — OXYCODONE HYDROCHLORIDE 5 MG/1
TABLET ORAL
Refills: 0 | COMMUNITY
Start: 2018-06-24 | End: 2020-08-05

## 2018-07-19 RX ORDER — ALBUTEROL SULFATE 90 MCG
HFA AEROSOL WITH ADAPTER (GRAM) INHALATION
COMMUNITY
Start: 2018-06-25 | End: 2018-07-19

## 2018-07-19 RX ORDER — ALBUTEROL SULFATE 90 MCG
HFA AEROSOL WITH ADAPTER (GRAM) INHALATION
Refills: 0 | COMMUNITY
Start: 2018-06-25 | End: 2018-07-19

## 2018-07-19 RX ORDER — MORPHINE SULFATE 30 MG/1
TABLET, FILM COATED, EXTENDED RELEASE ORAL
Refills: 0 | COMMUNITY
Start: 2018-06-24 | End: 2020-08-05

## 2018-07-19 RX ORDER — POLYETHYLENE GLYCOL 3350 17 G/17G
POWDER, FOR SOLUTION ORAL
Refills: 0 | COMMUNITY
Start: 2018-07-03 | End: 2020-08-05

## 2018-07-19 RX ORDER — OXYCODONE AND ACETAMINOPHEN 10; 325 MG/1; MG/1
TABLET ORAL
Refills: 0 | COMMUNITY
Start: 2018-07-10 | End: 2020-08-05

## 2018-07-19 RX ORDER — IPRATROPIUM BROMIDE AND ALBUTEROL SULFATE 2.5; .5 MG/3ML; MG/3ML
3 SOLUTION RESPIRATORY (INHALATION) EVERY 6 HOURS PRN
Qty: 90 BULLET | Refills: 0 | Status: SHIPPED | OUTPATIENT
Start: 2018-07-19 | End: 2018-08-21 | Stop reason: SDUPTHER

## 2018-07-19 RX ORDER — IBUPROFEN 800 MG/1
TABLET ORAL
Refills: 0 | COMMUNITY
Start: 2018-07-03 | End: 2022-04-02

## 2018-07-19 ASSESSMENT — PATIENT HEALTH QUESTIONNAIRE - PHQ9: CLINICAL INTERPRETATION OF PHQ2 SCORE: 0

## 2018-07-19 NOTE — ASSESSMENT & PLAN NOTE
This patient has been using Advair 100-50 on a daily basis with some relief of shortness of breath.  He does have occasional coughing fits which are alleviated by albuterol use, approximately 3 times per day.  He is requesting medication refills at this time.

## 2018-07-19 NOTE — PROGRESS NOTES
Chief Complaint   Patient presents with   • Medication Refill       HISTORY OF PRESENT ILLNESS: Patient is a 48 y.o. male established patient who presents today for evaluation and management of:    Tobacco abuse  This patient has stopped attempting to quit smoking at this time as he feels the patches and Wellbutrin did not work for him.    Severe persistent asthma without complication  This patient has been using Advair 100-50 on a daily basis with some relief of shortness of breath.  He does have occasional coughing fits which are alleviated by albuterol use, approximately 3 times per day.  He is requesting medication refills at this time.    Obesity (BMI 30-39.9)  This patient is not actively working to reduce his weight.    Mixed hyperlipidemia  This patient has been taking lovastatin although he is unsure of the dose that he was given at his recent hospital visit but he had previously been taking 20 mg tablets.  He denies chest pain, shortness of breath or blurry vision his most recent lab work is as follows:  Component      Latest Ref Rng & Units 5/19/2017           7:30 AM   Cholesterol,Tot      100 - 199 mg/dL 213 (H)   Triglycerides      0 - 149 mg/dL 196 (H)   HDL      >=40 mg/dL 27 (A)   LDL      <100 mg/dL 147 (H)       Environmental and seasonal allergies  Patient has noted increased rhinorrhea with sneezing and postnasal drip.  He states this is alleviated with cetirizine and is requesting medication refills at this time.       Patient Active Problem List    Diagnosis Date Noted   • Obesity (BMI 30-39.9) 01/08/2018   • Environmental and seasonal allergies 05/22/2017   • Mixed hyperlipidemia 05/22/2017   • Dupuytren's contracture of right hand 05/22/2017   • Severe persistent asthma without complication 05/09/2017   • Tobacco abuse 05/09/2017       Allergies:Patient has no known allergies.    Current Outpatient Prescriptions   Medication Sig Dispense Refill   • ipratropium-albuterol (DUONEB) 0.5-2.5 (3)  MG/3ML nebulizer solution 3 mL by Nebulization route every 6 hours as needed for Shortness of Breath. 90 Bullet 0   • fluticasone-salmeterol (ADVAIR) 250-50 MCG/DOSE AEROSOL POWDER, BREATH ACTIVATED Inhale 1 Puff by mouth every 12 hours. 1 Inhaler 5   • cetirizine (ZYRTEC) 10 MG Tab Take 1 Tab by mouth every day. 90 Tab 3   • oxyCODONE immediate-release (ROXICODONE) 5 MG Tab TK 1 T PO Q 4 H PRN P FOR 7 DAYS  0   • morphine ER (MS CONTIN) 30 MG Tab CR tablet TK 1 T PO  Q 12 H PRN P FOR 7 DAYS  0   • methocarbamol (ROBAXIN) 750 MG Tab TK 1 T PO  QID PRN  0   • ibuprofen (MOTRIN) 800 MG Tab   0   • gabapentin (NEURONTIN) 300 MG Cap TK ONE C PO  TID  0   • oxyCODONE-acetaminophen (PERCOCET-10)  MG Tab   0   • polyethylene glycol 3350 (MIRALAX) Powder   0   • lovastatin (MEVACOR) 10 MG tablet Take 2 Tabs by mouth every bedtime. 30 Tab 0   • albuterol 108 (90 Base) MCG/ACT Aero Soln inhalation aerosol Inhale 2 Puffs by mouth every 6 hours as needed for Shortness of Breath. 8.5 g 1   • fluticasone (FLONASE) 50 MCG/ACT nasal spray Spray 1 Spray in nose 2 times a day. 1 Bottle 0   • MethylPREDNISolone (MEDROL DOSEPAK) 4 MG Tablet Therapy Pack Use as package directs 21 Tab 0     No current facility-administered medications for this visit.        Social History   Substance Use Topics   • Smoking status: Current Every Day Smoker     Packs/day: 1.00     Years: 40.00     Types: Cigarettes   • Smokeless tobacco: Never Used   • Alcohol use No       Family Status   Relation Status   • Father    • Maternal Grandfather    • Paternal Grandfather    • Paternal Grandmother    • Maternal Grandmother    • Mother      Family History   Problem Relation Age of Onset   • Lung Cancer Father    • Diabetes Father    • Cancer Father      bone   • Hyperlipidemia Father    • Diabetes Maternal Grandfather    • Diabetes Paternal Grandfather    • Diabetes Paternal Grandmother    • Diabetes Maternal Grandmother    • Asthma Mother    • Other Mother  "     varicose veins       Review of Systems: See HPI above.       Exam:  Blood pressure 110/82, pulse 86, temperature 36.6 °C (97.9 °F), resp. rate 16, height 1.88 m (6' 2\"), weight 112 kg (247 lb), SpO2 96 %.  Body mass index is 31.71 kg/m².  General:  Overweight male in NAD  Head: is grossly normal.  Neck: Supple without masses. Thyroid is not visibly enlarged.  Pulmonary: Clear to ausculation. Normal effort. No rales, ronchi, or wheezing.  Cardiovascular: Regular rate and rhythm without murmur. Carotid and radial pulses are intact and equal bilaterally.  Extremities: no clubbing, cyanosis, or edema.    Medical decision-making and discussion:  1. Tobacco abuse  This patient was strongly advised to quit smoking due to his breathing problems.    2. Mixed hyperlipidemia    - LIPID PROFILE; Future    3. Severe persistent asthma without complication    - ipratropium-albuterol (DUONEB) 0.5-2.5 (3) MG/3ML nebulizer solution; 3 mL by Nebulization route every 6 hours as needed for Shortness of Breath.  Dispense: 90 Bullet; Refill: 0  - fluticasone-salmeterol (ADVAIR) 250-50 MCG/DOSE AEROSOL POWDER, BREATH ACTIVATED; Inhale 1 Puff by mouth every 12 hours.  Dispense: 1 Inhaler; Refill: 5  - COMP METABOLIC PANEL; Future  - CBC WITHOUT DIFFERENTIAL; Future    4. Obesity (BMI 30-39.9)    - Patient identified as having weight management issue.  Appropriate orders and counseling given.    5. Environmental and seasonal allergies    - cetirizine (ZYRTEC) 10 MG Tab; Take 1 Tab by mouth every day.  Dispense: 90 Tab; Refill: 3      Please note that this dictation was created using voice recognition software. I have made every reasonable attempt to correct obvious errors, but I expect that there are errors of grammar and possibly content that I did not discover before finalizing the note.      Return in about 4 weeks (around 8/16/2018) for breathing, labs drawn asap.  "

## 2018-07-19 NOTE — ASSESSMENT & PLAN NOTE
This patient has stopped attempting to quit smoking at this time as he feels the patches and Wellbutrin did not work for him.

## 2018-07-19 NOTE — ASSESSMENT & PLAN NOTE
This patient has been taking lovastatin although he is unsure of the dose that he was given at his recent hospital visit but he had previously been taking 20 mg tablets.  He denies chest pain, shortness of breath or blurry vision his most recent lab work is as follows:  Component      Latest Ref Rng & Units 5/19/2017           7:30 AM   Cholesterol,Tot      100 - 199 mg/dL 213 (H)   Triglycerides      0 - 149 mg/dL 196 (H)   HDL      >=40 mg/dL 27 (A)   LDL      <100 mg/dL 147 (H)

## 2018-07-19 NOTE — ASSESSMENT & PLAN NOTE
Patient has noted increased rhinorrhea with sneezing and postnasal drip.  He states this is alleviated with cetirizine and is requesting medication refills at this time.

## 2018-07-23 ENCOUNTER — OCCUPATIONAL MEDICINE (OUTPATIENT)
Dept: URGENT CARE | Facility: PHYSICIAN GROUP | Age: 48
End: 2018-07-23
Payer: COMMERCIAL

## 2018-07-23 VITALS
WEIGHT: 244 LBS | HEIGHT: 74 IN | SYSTOLIC BLOOD PRESSURE: 112 MMHG | DIASTOLIC BLOOD PRESSURE: 80 MMHG | OXYGEN SATURATION: 91 % | RESPIRATION RATE: 16 BRPM | HEART RATE: 100 BPM | TEMPERATURE: 97.7 F | BODY MASS INDEX: 31.32 KG/M2

## 2018-07-23 DIAGNOSIS — S22.42XD CLOSED FRACTURE OF MULTIPLE RIBS OF LEFT SIDE WITH ROUTINE HEALING, SUBSEQUENT ENCOUNTER: ICD-10-CM

## 2018-07-23 PROCEDURE — 99213 OFFICE O/P EST LOW 20 MIN: CPT | Performed by: EMERGENCY MEDICINE

## 2018-07-23 RX ORDER — IBUPROFEN 800 MG/1
800 TABLET ORAL EVERY 8 HOURS PRN
Qty: 20 TAB | Refills: 0 | Status: SHIPPED | OUTPATIENT
Start: 2018-07-23 | End: 2018-07-30

## 2018-07-23 RX ORDER — OXYCODONE AND ACETAMINOPHEN 10; 325 MG/1; MG/1
1 TABLET ORAL EVERY 12 HOURS PRN
Qty: 17 TAB | Refills: 0 | Status: SHIPPED | OUTPATIENT
Start: 2018-07-23 | End: 2018-07-30

## 2018-07-23 ASSESSMENT — ENCOUNTER SYMPTOMS
TINGLING: 0
NAUSEA: 0
BACK PAIN: 0
CONSTIPATION: 0
VOMITING: 0
HEMOPTYSIS: 0
COUGH: 1
SPUTUM PRODUCTION: 0
SHORTNESS OF BREATH: 0
FOCAL WEAKNESS: 0
ABDOMINAL PAIN: 0
SENSORY CHANGE: 1
WHEEZING: 1
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
## 2018-07-25 ENCOUNTER — HOSPITAL ENCOUNTER (OUTPATIENT)
Facility: MEDICAL CENTER | Age: 48
End: 2018-07-25
Attending: PHYSICIAN ASSISTANT
Payer: MEDICAID

## 2018-07-25 ENCOUNTER — NON-PROVIDER VISIT (OUTPATIENT)
Dept: MEDICAL GROUP | Facility: CLINIC | Age: 48
End: 2018-07-25
Payer: MEDICAID

## 2018-07-25 DIAGNOSIS — J45.50 SEVERE PERSISTENT ASTHMA WITHOUT COMPLICATION: ICD-10-CM

## 2018-07-25 DIAGNOSIS — E78.2 MIXED HYPERLIPIDEMIA: ICD-10-CM

## 2018-07-25 PROCEDURE — 36415 COLL VENOUS BLD VENIPUNCTURE: CPT | Performed by: FAMILY MEDICINE

## 2018-07-25 PROCEDURE — 85027 COMPLETE CBC AUTOMATED: CPT

## 2018-07-25 PROCEDURE — 80053 COMPREHEN METABOLIC PANEL: CPT

## 2018-07-25 PROCEDURE — 99000 SPECIMEN HANDLING OFFICE-LAB: CPT | Performed by: FAMILY MEDICINE

## 2018-07-25 PROCEDURE — 80061 LIPID PANEL: CPT

## 2018-07-26 LAB
ALBUMIN SERPL BCP-MCNC: 4.9 G/DL (ref 3.2–4.9)
ALBUMIN/GLOB SERPL: 1.8 G/DL
ALP SERPL-CCNC: 97 U/L (ref 30–99)
ALT SERPL-CCNC: 26 U/L (ref 2–50)
ANION GAP SERPL CALC-SCNC: 7 MMOL/L (ref 0–11.9)
AST SERPL-CCNC: 20 U/L (ref 12–45)
BILIRUB SERPL-MCNC: 0.4 MG/DL (ref 0.1–1.5)
BUN SERPL-MCNC: 19 MG/DL (ref 8–22)
CALCIUM SERPL-MCNC: 9.9 MG/DL (ref 8.5–10.5)
CHLORIDE SERPL-SCNC: 104 MMOL/L (ref 96–112)
CHOLEST SERPL-MCNC: 222 MG/DL (ref 100–199)
CO2 SERPL-SCNC: 28 MMOL/L (ref 20–33)
CREAT SERPL-MCNC: 1.04 MG/DL (ref 0.5–1.4)
ERYTHROCYTE [DISTWIDTH] IN BLOOD BY AUTOMATED COUNT: 43.6 FL (ref 35.9–50)
GLOBULIN SER CALC-MCNC: 2.7 G/DL (ref 1.9–3.5)
GLUCOSE SERPL-MCNC: 92 MG/DL (ref 65–99)
HCT VFR BLD AUTO: 49.8 % (ref 42–52)
HDLC SERPL-MCNC: 33 MG/DL
HGB BLD-MCNC: 16.1 G/DL (ref 14–18)
LDLC SERPL CALC-MCNC: 114 MG/DL
MCH RBC QN AUTO: 28.6 PG (ref 27–33)
MCHC RBC AUTO-ENTMCNC: 32.3 G/DL (ref 33.7–35.3)
MCV RBC AUTO: 88.6 FL (ref 81.4–97.8)
PLATELET # BLD AUTO: 230 K/UL (ref 164–446)
PMV BLD AUTO: 11.5 FL (ref 9–12.9)
POTASSIUM SERPL-SCNC: 4.1 MMOL/L (ref 3.6–5.5)
PROT SERPL-MCNC: 7.6 G/DL (ref 6–8.2)
RBC # BLD AUTO: 5.62 M/UL (ref 4.7–6.1)
SODIUM SERPL-SCNC: 139 MMOL/L (ref 135–145)
TRIGL SERPL-MCNC: 373 MG/DL (ref 0–149)
WBC # BLD AUTO: 7.6 K/UL (ref 4.8–10.8)

## 2018-07-30 ENCOUNTER — OCCUPATIONAL MEDICINE (OUTPATIENT)
Dept: URGENT CARE | Facility: PHYSICIAN GROUP | Age: 48
End: 2018-07-30
Payer: COMMERCIAL

## 2018-07-30 VITALS
TEMPERATURE: 98.3 F | HEIGHT: 74 IN | BODY MASS INDEX: 31.57 KG/M2 | HEART RATE: 106 BPM | WEIGHT: 246 LBS | DIASTOLIC BLOOD PRESSURE: 80 MMHG | SYSTOLIC BLOOD PRESSURE: 118 MMHG | RESPIRATION RATE: 18 BRPM | OXYGEN SATURATION: 90 %

## 2018-07-30 DIAGNOSIS — S22.42XD CLOSED FRACTURE OF MULTIPLE RIBS OF LEFT SIDE WITH ROUTINE HEALING, SUBSEQUENT ENCOUNTER: ICD-10-CM

## 2018-07-30 PROCEDURE — 99214 OFFICE O/P EST MOD 30 MIN: CPT | Mod: 29 | Performed by: PHYSICIAN ASSISTANT
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
## 2018-07-30 NOTE — PATIENT INSTRUCTIONS
"Smoking Cessation, Tips for Success  If you are ready to quit smoking, congratulations! You have chosen to help yourself be healthier. Cigarettes bring nicotine, tar, carbon monoxide, and other irritants into your body. Your lungs, heart, and blood vessels will be able to work better without these poisons. There are many different ways to quit smoking. Nicotine gum, nicotine patches, a nicotine inhaler, or nicotine nasal spray can help with physical craving. Hypnosis, support groups, and medicines help break the habit of smoking.  WHAT THINGS CAN I DO TO MAKE QUITTING EASIER?   Here are some tips to help you quit for good:  · Pick a date when you will quit smoking completely. Tell all of your friends and family about your plan to quit on that date.  · Do not try to slowly cut down on the number of cigarettes you are smoking. Pick a quit date and quit smoking completely starting on that day.  · Throw away all cigarettes.    · Clean and remove all ashtrays from your home, work, and car.  · On a card, write down your reasons for quitting. Carry the card with you and read it when you get the urge to smoke.  · Cleanse your body of nicotine. Drink enough water and fluids to keep your urine clear or pale yellow. Do this after quitting to flush the nicotine from your body.  · Learn to predict your moods. Do not let a bad situation be your excuse to have a cigarette. Some situations in your life might tempt you into wanting a cigarette.  · Never have \"just one\" cigarette. It leads to wanting another and another. Remind yourself of your decision to quit.  · Change habits associated with smoking. If you smoked while driving or when feeling stressed, try other activities to replace smoking. Stand up when drinking your coffee. Brush your teeth after eating. Sit in a different chair when you read the paper. Avoid alcohol while trying to quit, and try to drink fewer caffeinated beverages. Alcohol and caffeine may urge you to " "smoke.  · Avoid foods and drinks that can trigger a desire to smoke, such as sugary or spicy foods and alcohol.  · Ask people who smoke not to smoke around you.  · Have something planned to do right after eating or having a cup of coffee. For example, plan to take a walk or exercise.  · Try a relaxation exercise to calm you down and decrease your stress. Remember, you may be tense and nervous for the first 2 weeks after you quit, but this will pass.  · Find new activities to keep your hands busy. Play with a pen, coin, or rubber band. Doodle or draw things on paper.  · Brush your teeth right after eating. This will help cut down on the craving for the taste of tobacco after meals. You can also try mouthwash.    · Use oral substitutes in place of cigarettes. Try using lemon drops, carrots, cinnamon sticks, or chewing gum. Keep them handy so they are available when you have the urge to smoke.  · When you have the urge to smoke, try deep breathing.  · Designate your home as a nonsmoking area.  · If you are a heavy smoker, ask your health care provider about a prescription for nicotine chewing gum. It can ease your withdrawal from nicotine.  · Reward yourself. Set aside the cigarette money you save and buy yourself something nice.  · Look for support from others. Join a support group or smoking cessation program. Ask someone at home or at work to help you with your plan to quit smoking.  · Always ask yourself, \"Do I need this cigarette or is this just a reflex?\" Tell yourself, \"Today, I choose not to smoke,\" or \"I do not want to smoke.\" You are reminding yourself of your decision to quit.  · Do not replace cigarette smoking with electronic cigarettes (commonly called e-cigarettes). The safety of e-cigarettes is unknown, and some may contain harmful chemicals.  · If you relapse, do not give up! Plan ahead and think about what you will do the next time you get the urge to smoke.  HOW WILL I FEEL WHEN I QUIT SMOKING?  You " may have symptoms of withdrawal because your body is used to nicotine (the addictive substance in cigarettes). You may crave cigarettes, be irritable, feel very hungry, cough often, get headaches, or have difficulty concentrating. The withdrawal symptoms are only temporary. They are strongest when you first quit but will go away within 10-14 days. When withdrawal symptoms occur, stay in control. Think about your reasons for quitting. Remind yourself that these are signs that your body is healing and getting used to being without cigarettes. Remember that withdrawal symptoms are easier to treat than the major diseases that smoking can cause.   Even after the withdrawal is over, expect periodic urges to smoke. However, these cravings are generally short lived and will go away whether you smoke or not. Do not smoke!  WHAT RESOURCES ARE AVAILABLE TO HELP ME QUIT SMOKING?  Your health care provider can direct you to community resources or hospitals for support, which may include:  · Group support.  · Education.  · Hypnosis.  · Therapy.     This information is not intended to replace advice given to you by your health care provider. Make sure you discuss any questions you have with your health care provider.     Document Released: 09/15/2005 Document Revised: 01/08/2016 Document Reviewed: 06/05/2014  Company Interactive Patient Education ©2016 [employer]

## 2018-07-30 NOTE — LETTER
[address] [place] [address]  Phone:  [phone] - Fax:  [phone]   [facility] Progress Report and Disability Certification  Date of Service: 2018   No Show:  No  Date / Time of Next Visit: 2018   Claim Information   Patient Name: Dayton Hassan  Claim Number:     Employer: [employer]  Date of Injury: 2018     Insurer / TPA: [employer] Workers [employer]  ID / SSN:     Occupation:   Diagnosis: The encounter diagnosis was Closed fracture of multiple ribs of left side with routine healing, subsequent encounter.    Medical Information   Related to Industrial Injury?      Subjective Complaints:  Continued left rib pain   Objective Findings: No bruising or ecchymosis, tenderness to palpation to the left rib cage.   Pre-Existing Condition(s):     Assessment:   Condition Same    Status: Additional Care Required  Comments:Refer to occupational health for pain management  Permanent Disability:No    Plan: MedicationMedication (NOT at Work)    Diagnostics:      Comments:       Disability Information   Status: Released to Restricted Duty    From:  2018  Through: 2018 Restrictions are: Temporary   Physical Restrictions   Sitting:    Standing:    Stooping:    Bendin hrs/day   Squattin hrs/day Walking:    Climbin hrs/day Pushin hrs/day   Pullin hrs/day Other:    Reaching Above Shoulder (L): 0 hrs/day Reaching Above Shoulder (R):       Reaching Below Shoulder (L):  0 hrs/day Reaching Below Shoulder (R):      Not to exceed Weight Limits   Carrying(hrs):   Weight Limit(lb): < or = to 10 pounds Lifting(hrs):   Weight  Limit(lb): < or = to 10 pounds   Comments:      Repetitive Actions   Hands: i.e. Fine Manipulations from Grasping:     Feet: i.e. Operating Foot Controls:     Driving / Operate Machinery:     Physician Name: Lloyd Shannon P.A.-C. Physician Signature: LLOYD Mills P.A.-C.  e-Signature: Dr. Erick Burnham, Medical Director   Clinic Name / Location: [address] [address] Clinic Phone Number: Dept: [phone]   Appointment Time: 9:00 Am Visit Start Time: 9:28 AM   Check-In Time:  9:17 Am Visit Discharge Time:  9:57PM   Original-Treating Physician or Chiropractor    Page 2-Insurer/TPA    Page 3-Employer    Page 4-Employee

## 2018-08-06 ENCOUNTER — OCCUPATIONAL MEDICINE (OUTPATIENT)
Dept: OCCUPATIONAL MEDICINE | Facility: CLINIC | Age: 48
End: 2018-08-06
Payer: COMMERCIAL

## 2018-08-06 VITALS
WEIGHT: 246 LBS | OXYGEN SATURATION: 96 % | HEART RATE: 82 BPM | DIASTOLIC BLOOD PRESSURE: 86 MMHG | HEIGHT: 74 IN | BODY MASS INDEX: 31.57 KG/M2 | TEMPERATURE: 97.6 F | SYSTOLIC BLOOD PRESSURE: 128 MMHG | RESPIRATION RATE: 16 BRPM

## 2018-08-06 DIAGNOSIS — S22.42XD CLOSED FRACTURE OF MULTIPLE RIBS OF LEFT SIDE WITH ROUTINE HEALING, SUBSEQUENT ENCOUNTER: ICD-10-CM

## 2018-08-06 PROCEDURE — 99204 OFFICE O/P NEW MOD 45 MIN: CPT | Performed by: PREVENTIVE MEDICINE

## 2018-08-06 RX ORDER — TRAMADOL HYDROCHLORIDE 50 MG/1
50 TABLET ORAL EVERY 6 HOURS PRN
Qty: 30 TAB | Refills: 0 | Status: SHIPPED | OUTPATIENT
Start: 2018-08-06 | End: 2018-08-26

## 2018-08-06 RX ORDER — IBUPROFEN 800 MG/1
800 TABLET ORAL EVERY 8 HOURS PRN
Qty: 60 TAB | Refills: 0 | Status: SHIPPED | OUTPATIENT
Start: 2018-08-06 | End: 2020-08-05
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
## 2018-08-21 ENCOUNTER — OFFICE VISIT (OUTPATIENT)
Dept: MEDICAL GROUP | Facility: CLINIC | Age: 48
End: 2018-08-21
Payer: MEDICAID

## 2018-08-21 VITALS
TEMPERATURE: 98 F | OXYGEN SATURATION: 91 % | HEART RATE: 78 BPM | SYSTOLIC BLOOD PRESSURE: 118 MMHG | HEIGHT: 74 IN | BODY MASS INDEX: 31.57 KG/M2 | DIASTOLIC BLOOD PRESSURE: 78 MMHG | WEIGHT: 246 LBS | RESPIRATION RATE: 14 BRPM

## 2018-08-21 DIAGNOSIS — E78.2 MIXED HYPERLIPIDEMIA: ICD-10-CM

## 2018-08-21 DIAGNOSIS — J45.50 SEVERE PERSISTENT ASTHMA WITHOUT COMPLICATION: ICD-10-CM

## 2018-08-21 DIAGNOSIS — F17.200 CURRENT SMOKER: ICD-10-CM

## 2018-08-21 PROCEDURE — 99214 OFFICE O/P EST MOD 30 MIN: CPT | Mod: 25 | Performed by: PHYSICIAN ASSISTANT

## 2018-08-21 PROCEDURE — 99406 BEHAV CHNG SMOKING 3-10 MIN: CPT | Mod: 25 | Performed by: PHYSICIAN ASSISTANT

## 2018-08-21 RX ORDER — ALBUTEROL SULFATE 90 UG/1
2 AEROSOL, METERED RESPIRATORY (INHALATION) EVERY 6 HOURS PRN
Qty: 8.5 G | Refills: 1 | Status: SHIPPED | OUTPATIENT
Start: 2018-08-21 | End: 2020-08-05 | Stop reason: SDUPTHER

## 2018-08-21 RX ORDER — TRAMADOL HYDROCHLORIDE 50 MG/1
TABLET ORAL
Refills: 0 | COMMUNITY
Start: 2018-08-07 | End: 2020-08-05

## 2018-08-21 RX ORDER — IPRATROPIUM BROMIDE AND ALBUTEROL SULFATE 2.5; .5 MG/3ML; MG/3ML
3 SOLUTION RESPIRATORY (INHALATION) EVERY 6 HOURS PRN
Qty: 90 BULLET | Refills: 0 | Status: SHIPPED | OUTPATIENT
Start: 2018-08-21 | End: 2020-08-05

## 2018-08-21 RX ORDER — VARENICLINE TARTRATE 1 MG/1
TABLET, FILM COATED ORAL
Qty: 60 TAB | Refills: 2 | Status: SHIPPED | OUTPATIENT
Start: 2018-08-21 | End: 2020-08-05

## 2018-08-21 RX ORDER — PRAVASTATIN SODIUM 40 MG
40 TABLET ORAL
Qty: 90 TAB | Refills: 3 | Status: SHIPPED | OUTPATIENT
Start: 2018-08-21 | End: 2020-08-05

## 2018-08-22 PROBLEM — F17.200 SMOKER UNMOTIVATED TO QUIT: Status: ACTIVE | Noted: 2017-05-09

## 2018-08-22 NOTE — ASSESSMENT & PLAN NOTE
This patient had stopped attempting to quit smoking as he felt the patches and Wellbutrin did not work for him but he is willing to retry chantix to attempt to quit. He does wish to quit smoking but because he is a , he isn't sure what else to do with his hands or his habitual cigarette use.

## 2018-08-22 NOTE — PROGRESS NOTES
Chief Complaint   Patient presents with   • Hyperlipidemia     FV labs        HISTORY OF PRESENT ILLNESS: Patient is a 48 y.o. male established patient who presents today for evaluation and management of:    Mixed hyperlipidemia  Component      Latest Ref Rng & Units 7/25/2018   Cholesterol,Tot      100 - 199 mg/dL 222 (H)   Triglycerides      0 - 149 mg/dL 373 (H)   HDL      >=40 mg/dL 33 (A)   LDL      <100 mg/dL 114 (H)   This patient has been taking 10mg lovastatin. His most recent labs are as above. He denies side effects from this medication and denies chest pain, shortness of breath or headache.    Severe persistent asthma without complication  This patient continues using albuterol rescue inhalers, Advair 250-50 mcg per dose breath activated aerosol daily, DuoNeb nebulizer solution as needed with good relief of difficulty breathing and coughing attacks.  He is requesting refills at this time.    Current smoker  This patient had stopped attempting to quit smoking as he felt the patches and Wellbutrin did not work for him but he is willing to retry chantix to attempt to quit. He does wish to quit smoking but because he is a , he isn't sure what else to do with his hands or his habitual cigarette use.        Patient Active Problem List    Diagnosis Date Noted   • Obesity (BMI 30-39.9) 01/08/2018   • Environmental and seasonal allergies 05/22/2017   • Mixed hyperlipidemia 05/22/2017   • Dupuytren's contracture of right hand 05/22/2017   • Severe persistent asthma without complication 05/09/2017   • Current smoker 05/09/2017       Allergies:Patient has no known allergies.    Current Outpatient Prescriptions   Medication Sig Dispense Refill   • pravastatin (PRAVACHOL) 40 MG tablet Take 1 Tab by mouth every bedtime. 90 Tab 3   • ipratropium-albuterol (DUONEB) 0.5-2.5 (3) MG/3ML nebulizer solution 3 mL by Nebulization route every 6 hours as needed for Shortness of Breath. 90 Bullet 0   • albuterol 108  (90 Base) MCG/ACT Aero Soln inhalation aerosol Inhale 2 Puffs by mouth every 6 hours as needed for Shortness of Breath. 8.5 g 1   • varenicline (CHANTIX) 1 MG tablet 0.5 mg PO daily for 3 days then, 0.5mg PO BID for 4 days then, 1.0 mg PO BID thereafter. 60 Tab 2   • tramadol (ULTRAM) 50 MG Tab   0   • oxyCODONE immediate-release (ROXICODONE) 5 MG Tab TK 1 T PO Q 4 H PRN P FOR 7 DAYS  0   • morphine ER (MS CONTIN) 30 MG Tab CR tablet TK 1 T PO  Q 12 H PRN P FOR 7 DAYS  0   • methocarbamol (ROBAXIN) 750 MG Tab TK 1 T PO  QID PRN  0   • ibuprofen (MOTRIN) 800 MG Tab   0   • gabapentin (NEURONTIN) 300 MG Cap TK ONE C PO  TID  0   • oxyCODONE-acetaminophen (PERCOCET-10)  MG Tab   0   • polyethylene glycol 3350 (MIRALAX) Powder   0   • fluticasone-salmeterol (ADVAIR) 250-50 MCG/DOSE AEROSOL POWDER, BREATH ACTIVATED Inhale 1 Puff by mouth every 12 hours. 1 Inhaler 5   • cetirizine (ZYRTEC) 10 MG Tab Take 1 Tab by mouth every day. 90 Tab 3   • fluticasone (FLONASE) 50 MCG/ACT nasal spray Spray 1 Spray in nose 2 times a day. 1 Bottle 0   • MethylPREDNISolone (MEDROL DOSEPAK) 4 MG Tablet Therapy Pack Use as package directs 21 Tab 0     No current facility-administered medications for this visit.        Social History   Substance Use Topics   • Smoking status: Current Every Day Smoker     Packs/day: 2.00     Years: 40.00     Types: Cigarettes   • Smokeless tobacco: Never Used      Comment: currently 1 ppd, down from 3 ppd for many years   • Alcohol use No       Family Status   Relation Status   • Fa (Not Specified)   • MGFa (Not Specified)   • PGFa (Not Specified)   • PGMo (Not Specified)   • MGMo (Not Specified)   • Mo (Not Specified)     Family History   Problem Relation Age of Onset   • Lung Cancer Father    • Diabetes Father    • Cancer Father         bone   • Hyperlipidemia Father    • Diabetes Maternal Grandfather    • Diabetes Paternal Grandfather    • Diabetes Paternal Grandmother    • Diabetes Maternal  "Grandmother    • Asthma Mother    • Other Mother         varicose veins       Review of Systems: See HPI above.  Constitutional: Negative for fever, chills, weight loss and malaise/fatigue.   HENT: Negative for ear pain, nosebleeds, congestion, sore throat and neck pain.  Positive for nasal sinus congestion.  Eyes: Negative for blurred vision.   Respiratory: Positive daily smokers for cough with occasional sputum production, shortness of breath and frequent wheezing.    Cardiovascular: Negative for chest pain, palpitations, orthopnea and leg swelling.   Gastrointestinal: Negative for heartburn, nausea, vomiting and abdominal pain.   Genitourinary: Negative for dysuria, urgency and frequency.       Exam:  Blood pressure 118/78, pulse 78, temperature 36.7 °C (98 °F), resp. rate 14, height 1.88 m (6' 2\"), weight 111.6 kg (246 lb), SpO2 91 %.  Body mass index is 31.58 kg/m².  General:  Overweight male in NAD  Head: is grossly normal.  Neck: Supple without masses. Thyroid is not visibly enlarged.  Pulmonary: Positive for slightly diminished breath sounds and wheezes in bilateral upper and lower lung fields without rhonchi.  Slightly increased effort in conversation but normal effort at rest.  Cardiovascular: Regular rate and rhythm without murmur. Carotid and radial pulses are intact and equal bilaterally.  Extremities: no  cyanosis, or edema.  Clubbing and fingernails bilaterally.    Medical decision-making and discussion:  1. Severe persistent asthma without complication    - ipratropium-albuterol (DUONEB) 0.5-2.5 (3) MG/3ML nebulizer solution; 3 mL by Nebulization route every 6 hours as needed for Shortness of Breath.  Dispense: 90 Bullet; Refill: 0  - albuterol 108 (90 Base) MCG/ACT Aero Soln inhalation aerosol; Inhale 2 Puffs by mouth every 6 hours as needed for Shortness of Breath.  Dispense: 8.5 g; Refill: 1    2. Mixed hyperlipidemia    - pravastatin (PRAVACHOL) 40 MG tablet; Take 1 Tab by mouth every bedtime.  " Dispense: 90 Tab; Refill: 3  - LIPID PANEL  - COMP METABOLIC PANEL; Future    3. Current smoker  Patient was counseled regarding quitting smoking and the importance of this in relation to his wheezing and shortness of breath.  He agrees to take the following medication in a new attempt to quit although he has made a great deal of progress since smoking 3 packs/day and is now smoking 1 pack/day.  - varenicline (CHANTIX) 1 MG tablet; 0.5 mg PO daily for 3 days then, 0.5mg PO BID for 4 days then, 1.0 mg PO BID thereafter.  Dispense: 60 Tab; Refill: 2      Please note that this dictation was created using voice recognition software. I have made every reasonable attempt to correct obvious errors, but I expect that there are errors of grammar and possibly content that I did not discover before finalizing the note.      Return in about 6 months (around 2/21/2019) for Chronic conditions.

## 2018-08-22 NOTE — ASSESSMENT & PLAN NOTE
This patient continues using albuterol rescue inhalers, Advair 250-50 mcg per dose breath activated aerosol daily, DuoNeb nebulizer solution as needed with good relief of difficulty breathing and coughing attacks.  He is requesting refills at this time.

## 2018-08-22 NOTE — ASSESSMENT & PLAN NOTE
Component      Latest Ref Rng & Units 7/25/2018   Cholesterol,Tot      100 - 199 mg/dL 222 (H)   Triglycerides      0 - 149 mg/dL 373 (H)   HDL      >=40 mg/dL 33 (A)   LDL      <100 mg/dL 114 (H)   This patient has been taking 10mg lovastatin. His most recent labs are as above. He denies side effects from this medication and denies chest pain, shortness of breath or headache.

## 2018-08-29 ENCOUNTER — OCCUPATIONAL MEDICINE (OUTPATIENT)
Dept: OCCUPATIONAL MEDICINE | Facility: CLINIC | Age: 48
End: 2018-08-29
Payer: COMMERCIAL

## 2018-08-29 ENCOUNTER — APPOINTMENT (OUTPATIENT)
Dept: RADIOLOGY | Facility: IMAGING CENTER | Age: 48
End: 2018-08-29
Attending: PREVENTIVE MEDICINE
Payer: COMMERCIAL

## 2018-08-29 VITALS
BODY MASS INDEX: 31.57 KG/M2 | TEMPERATURE: 98.9 F | HEART RATE: 100 BPM | RESPIRATION RATE: 16 BRPM | HEIGHT: 74 IN | DIASTOLIC BLOOD PRESSURE: 80 MMHG | SYSTOLIC BLOOD PRESSURE: 118 MMHG | OXYGEN SATURATION: 97 % | WEIGHT: 246 LBS

## 2018-08-29 DIAGNOSIS — S22.42XD CLOSED FRACTURE OF MULTIPLE RIBS OF LEFT SIDE WITH ROUTINE HEALING, SUBSEQUENT ENCOUNTER: ICD-10-CM

## 2018-08-29 DIAGNOSIS — S29.019D THORACIC MYOFASCIAL STRAIN, SUBSEQUENT ENCOUNTER: ICD-10-CM

## 2018-08-29 PROCEDURE — 72070 X-RAY EXAM THORAC SPINE 2VWS: CPT | Mod: 26 | Performed by: PHYSICIAN ASSISTANT

## 2018-08-29 PROCEDURE — 99214 OFFICE O/P EST MOD 30 MIN: CPT | Performed by: PREVENTIVE MEDICINE

## 2018-08-29 RX ORDER — PREDNISONE 20 MG/1
40 TABLET ORAL DAILY
Qty: 14 TAB | Refills: 0 | Status: SHIPPED | OUTPATIENT
Start: 2018-08-29 | End: 2018-09-05

## 2018-08-29 RX ORDER — TIZANIDINE 4 MG/1
4 TABLET ORAL
Qty: 30 TAB | Refills: 0 | Status: SHIPPED | OUTPATIENT
Start: 2018-08-29 | End: 2020-08-05

## 2018-08-29 ASSESSMENT — PAIN SCALES - GENERAL: PAINLEVEL: 5=MODERATE PAIN

## 2018-08-29 ASSESSMENT — ENCOUNTER SYMPTOMS
COUGH: 1
TINGLING: 1
SENSORY CHANGE: 1
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
## 2018-09-28 ENCOUNTER — OCCUPATIONAL MEDICINE (OUTPATIENT)
Dept: OCCUPATIONAL MEDICINE | Facility: CLINIC | Age: 48
End: 2018-09-28
Payer: COMMERCIAL

## 2018-09-28 VITALS
TEMPERATURE: 97 F | HEART RATE: 116 BPM | WEIGHT: 260 LBS | OXYGEN SATURATION: 93 % | BODY MASS INDEX: 33.37 KG/M2 | HEIGHT: 74 IN | SYSTOLIC BLOOD PRESSURE: 132 MMHG | DIASTOLIC BLOOD PRESSURE: 88 MMHG

## 2018-09-28 DIAGNOSIS — S22.42XD CLOSED FRACTURE OF MULTIPLE RIBS OF LEFT SIDE WITH ROUTINE HEALING, SUBSEQUENT ENCOUNTER: ICD-10-CM

## 2018-09-28 DIAGNOSIS — S29.019D THORACIC MYOFASCIAL STRAIN, SUBSEQUENT ENCOUNTER: ICD-10-CM

## 2018-09-28 PROCEDURE — 99213 OFFICE O/P EST LOW 20 MIN: CPT | Performed by: PREVENTIVE MEDICINE

## 2018-09-28 ASSESSMENT — ENCOUNTER SYMPTOMS
SENSORY CHANGE: 1
TINGLING: 1
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
## 2018-10-05 ENCOUNTER — OCCUPATIONAL MEDICINE (OUTPATIENT)
Dept: OCCUPATIONAL MEDICINE | Facility: CLINIC | Age: 48
End: 2018-10-05
Payer: COMMERCIAL

## 2018-10-05 VITALS
WEIGHT: 259 LBS | HEART RATE: 107 BPM | HEIGHT: 74 IN | OXYGEN SATURATION: 92 % | DIASTOLIC BLOOD PRESSURE: 78 MMHG | SYSTOLIC BLOOD PRESSURE: 132 MMHG | TEMPERATURE: 99.1 F | BODY MASS INDEX: 33.24 KG/M2

## 2018-10-05 DIAGNOSIS — S29.019D THORACIC MYOFASCIAL STRAIN, SUBSEQUENT ENCOUNTER: ICD-10-CM

## 2018-10-05 DIAGNOSIS — S22.42XD CLOSED FRACTURE OF MULTIPLE RIBS OF LEFT SIDE WITH ROUTINE HEALING, SUBSEQUENT ENCOUNTER: ICD-10-CM

## 2018-10-05 PROCEDURE — 99213 OFFICE O/P EST LOW 20 MIN: CPT | Performed by: PREVENTIVE MEDICINE

## 2018-10-05 RX ORDER — TIZANIDINE 4 MG/1
4 TABLET ORAL
Qty: 30 TAB | Refills: 0 | Status: SHIPPED | OUTPATIENT
Start: 2018-10-05 | End: 2018-11-04

## 2018-10-05 ASSESSMENT — ENCOUNTER SYMPTOMS
TINGLING: 1
SHORTNESS OF BREATH: 0
WHEEZING: 0
SENSORY CHANGE: 0
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
## 2018-10-30 ENCOUNTER — OCCUPATIONAL MEDICINE (OUTPATIENT)
Dept: OCCUPATIONAL MEDICINE | Facility: CLINIC | Age: 48
End: 2018-10-30
Payer: COMMERCIAL

## 2018-10-30 VITALS
HEART RATE: 86 BPM | BODY MASS INDEX: 33.37 KG/M2 | TEMPERATURE: 98.2 F | HEIGHT: 74 IN | SYSTOLIC BLOOD PRESSURE: 116 MMHG | OXYGEN SATURATION: 97 % | RESPIRATION RATE: 18 BRPM | DIASTOLIC BLOOD PRESSURE: 78 MMHG | WEIGHT: 260 LBS

## 2018-10-30 DIAGNOSIS — S29.019D THORACIC MYOFASCIAL STRAIN, SUBSEQUENT ENCOUNTER: ICD-10-CM

## 2018-10-30 DIAGNOSIS — S22.42XD CLOSED FRACTURE OF MULTIPLE RIBS OF LEFT SIDE WITH ROUTINE HEALING, SUBSEQUENT ENCOUNTER: ICD-10-CM

## 2018-10-30 PROCEDURE — 99212 OFFICE O/P EST SF 10 MIN: CPT | Performed by: PREVENTIVE MEDICINE
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
## 2019-06-12 NOTE — PROGRESS NOTES
Quick Note:    I reviewed labs. I will speak with patient at his appointment today.  ______  
Discharged
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
## 2020-08-05 ENCOUNTER — OFFICE VISIT (OUTPATIENT)
Dept: URGENT CARE | Facility: PHYSICIAN GROUP | Age: 50
End: 2020-08-05
Payer: OTHER MISCELLANEOUS

## 2020-08-05 VITALS
WEIGHT: 233 LBS | DIASTOLIC BLOOD PRESSURE: 72 MMHG | TEMPERATURE: 98.3 F | RESPIRATION RATE: 16 BRPM | OXYGEN SATURATION: 95 % | HEIGHT: 74 IN | HEART RATE: 72 BPM | SYSTOLIC BLOOD PRESSURE: 110 MMHG | BODY MASS INDEX: 29.9 KG/M2

## 2020-08-05 DIAGNOSIS — K04.7 DENTAL INFECTION: ICD-10-CM

## 2020-08-05 DIAGNOSIS — J45.909 ASTHMA, UNSPECIFIED ASTHMA SEVERITY, UNSPECIFIED WHETHER COMPLICATED, UNSPECIFIED WHETHER PERSISTENT: ICD-10-CM

## 2020-08-05 PROCEDURE — 99214 OFFICE O/P EST MOD 30 MIN: CPT | Mod: 25 | Performed by: FAMILY MEDICINE

## 2020-08-05 RX ORDER — HYDROCODONE BITARTRATE AND ACETAMINOPHEN 5; 325 MG/1; MG/1
TABLET ORAL
Qty: 12 TAB | Refills: 0 | Status: SHIPPED | OUTPATIENT
Start: 2020-08-05 | End: 2020-08-05 | Stop reason: SDUPTHER

## 2020-08-05 RX ORDER — HYDROCODONE BITARTRATE AND ACETAMINOPHEN 5; 325 MG/1; MG/1
TABLET ORAL
Qty: 12 TAB | Refills: 0 | Status: SHIPPED | OUTPATIENT
Start: 2020-08-05 | End: 2020-08-10

## 2020-08-05 RX ORDER — IPRATROPIUM BROMIDE AND ALBUTEROL SULFATE 2.5; .5 MG/3ML; MG/3ML
3 SOLUTION RESPIRATORY (INHALATION) EVERY 4 HOURS PRN
Qty: 30 EACH | Refills: 3 | Status: SHIPPED | OUTPATIENT
Start: 2020-08-05 | End: 2021-03-31 | Stop reason: SDUPTHER

## 2020-08-05 RX ORDER — AMOXICILLIN AND CLAVULANATE POTASSIUM 875; 125 MG/1; MG/1
TABLET, FILM COATED ORAL
Qty: 20 TAB | Refills: 0 | Status: SHIPPED | OUTPATIENT
Start: 2020-08-05 | End: 2022-04-02

## 2020-08-05 RX ORDER — ALBUTEROL SULFATE 90 UG/1
2 AEROSOL, METERED RESPIRATORY (INHALATION) EVERY 4 HOURS PRN
Qty: 8.5 G | Refills: 3 | Status: SHIPPED | OUTPATIENT
Start: 2020-08-05 | End: 2022-04-02

## 2020-08-05 NOTE — LETTER
August 5, 2020         Patient: Dayton Hassan   YOB: 1970   Date of Visit: 8/5/2020           To Whom it May Concern:    Dayton Hassan was seen in my clinic on 8/5/2020.     Please excuse from work for 8/5 and 8/6/2020 due to medical condition.    May return on 8/6/2020 if better.    If you have any questions or concerns, please don't hesitate to call.        Sincerely,           Jayesh Menjivar M.D.  Electronically Signed

## 2020-08-05 NOTE — PROGRESS NOTES
Chief Complaint:    Chief Complaint   Patient presents with   • Oral Pain     Oral infection/med refill       History of Present Illness:    He is here for multiple issues.    He has left upper tooth pain x 2 days which has subsequently worsened, now pain is severe, and now the left side of his face is swollen. He reports he can take Hydrocodone-APAP without problem.    He is also requesting refills of his Albuterol MDI and Duoneb solution for his Asthma.      Review of Systems:    Constitutional: Negative for fever, chills, and diaphoresis.   Eyes: Negative for change in vision, photophobia, pain, redness, and discharge.  ENT: See HPI.  Respiratory: See HPI.  Cardiovascular: Negative for chest pain, palpitations, orthopnea, claudication, leg swelling, and PND.   Gastrointestinal: Negative for abdominal pain, nausea, vomiting, diarrhea, constipation, blood in stool, and melena.   Genitourinary: Negative for dysuria, urinary urgency, urinary frequency, hematuria, and flank pain.   Musculoskeletal: Negative for myalgias, joint pain, neck pain, and back pain.   Skin: Negative for rash and itching.   Neurological: Negative for dizziness, tingling, tremors, sensory change, speech change, focal weakness, seizures, loss of consciousness, and headaches.   Endo: Negative for polydipsia.   Heme: Does not bruise/bleed easily.   Psychiatric/Behavioral: Negative for depression, suicidal ideas, hallucinations, and memory loss. The patient is not nervous/anxious and does not have insomnia.        Past Medical History:    Past Medical History:   Diagnosis Date   • Asthma    • Chronic airway obstruction, not elsewhere classified      Past Surgical History:    History reviewed. No pertinent surgical history.    Social History:    Social History     Socioeconomic History   • Marital status: Unknown     Spouse name: Not on file   • Number of children: Not on file   • Years of education: Not on file   • Highest education level: Not on  file   Occupational History   • Not on file   Social Needs   • Financial resource strain: Not on file   • Food insecurity     Worry: Not on file     Inability: Not on file   • Transportation needs     Medical: Not on file     Non-medical: Not on file   Tobacco Use   • Smoking status: Current Every Day Smoker     Packs/day: 2.00     Years: 40.00     Pack years: 80.00     Types: Cigarettes   • Smokeless tobacco: Never Used   • Tobacco comment: currently 1 ppd, down from 3 ppd for many years   Substance and Sexual Activity   • Alcohol use: No     Alcohol/week: 0.0 oz   • Drug use: No   • Sexual activity: Not on file   Lifestyle   • Physical activity     Days per week: Not on file     Minutes per session: Not on file   • Stress: Not on file   Relationships   • Social connections     Talks on phone: Not on file     Gets together: Not on file     Attends [employer] service: Not on file     Active member of club or organization: Not on file     Attends meetings of clubs or organizations: Not on file     Relationship status: Not on file   • Intimate partner violence     Fear of current or ex partner: Not on file     Emotionally abused: Not on file     Physically abused: Not on file     Forced sexual activity: Not on file   Other Topics Concern   • Not on file   Social History Narrative    ** Merged History Encounter **          Family History:    Family History   Problem Relation Age of Onset   • Lung Cancer Father    • Diabetes Father    • Cancer Father         bone   • Hyperlipidemia Father    • Diabetes Maternal Grandfather    • Diabetes Paternal Grandfather    • Diabetes Paternal Grandmother    • Diabetes Maternal Grandmother    • Asthma Mother    • Other Mother         varicose veins     Medications:    Current Outpatient Medications on File Prior to Visit   Medication Sig Dispense Refill   • albuterol 108 (90 Base) MCG/ACT Aero Soln inhalation aerosol Inhale 2 Puffs by mouth every 6 hours as needed for Shortness of  "Breath. 8.5 g 1   • ibuprofen (MOTRIN) 800 MG Tab   0   • fluticasone-salmeterol (ADVAIR) 250-50 MCG/DOSE AEROSOL POWDER, BREATH ACTIVATED Inhale 1 Puff by mouth every 12 hours. 1 Inhaler 5   • gabapentin (NEURONTIN) 300 MG Cap Take 1 Cap by mouth 3 times a day. 90 Cap 0   • ipratropium-albuterol (DUONEB) 0.5-2.5 (3) MG/3ML nebulizer solution 3 mL by Nebulization route every 6 hours as needed for Shortness of Breath. 30 Bullet 0   • cetirizine (ZYRTEC) 10 MG Tab Take 10 mg by mouth every day.     • fluticasone (FLONASE) 50 MCG/ACT nasal spray Spray 1 Spray in nose 2 times a day. 1 Bottle 0     No current facility-administered medications on file prior to visit.      Allergies:    No Known Allergies      Vitals:    Vitals:    08/05/20 1211   BP: 110/72   Pulse: 72   Resp: 16   Temp: 36.8 °C (98.3 °F)   TempSrc: Temporal   SpO2: 95%   Weight: 105.7 kg (233 lb)   Height: 1.88 m (6' 2\")       Physical Exam:    Constitutional: Vital signs reviewed. Appears well-developed and well-nourished. No acute distress.   Eyes: Sclera white, conjunctivae clear. PERRLA.  ENT: Left upper tooth is broken and tender to palpation. External ears normal. Hearing normal. Oral mucosa pink and moist. Posterior pharynx: WNL.  Neck: Neck supple.   Pulmonary/Chest: Respirations non-labored.  Lymph: Cervical nodes without tenderness or enlargement.  Musculoskeletal: Normal gait. Normal range of motion. No muscular atrophy or weakness.  Neurological: Alert and oriented to person, place, and time. CN 2-12 intact. Muscle tone normal. Coordination normal.   Skin: Left side of face is erythematous and moderately swollen in left cheek and lower left eyelid compared to right side.  Psychiatric: Normal mood and affect. Behavior is normal. Judgment and thought content normal.       Assessment / Plan:    1. Dental infection  - cefTRIAXone (ROCEPHIN) 1 g, lidocaine (XYLOCAINE) 1 % 3.6 mL for IM use  - amoxicillin-clavulanate (AUGMENTIN) 875-125 MG Tab; 1 " TAB BY MOUTH TWICE A DAY X 10 DAYS. TAKE WITH FOOD.  Dispense: 20 Tab; Refill: 0  - HYDROcodone-acetaminophen (NORCO) 5-325 MG Tab per tablet; 1 TAB BY MOUTH EVERY 6 HOURS ONLY IF NEEDED FOR PAIN FOR UP TO 3 DAYS. MAY CAUSE DROWSINESS.  Dispense: 12 Tab; Refill: 0    2. Asthma, unspecified asthma severity, unspecified whether complicated, unspecified whether persistent  - albuterol 108 (90 Base) MCG/ACT Aero Soln inhalation aerosol; Inhale 2 Puffs by mouth every four hours as needed for Shortness of Breath.  Dispense: 8.5 g; Refill: 3  - ipratropium-albuterol (DUONEB) 0.5-2.5 (3) MG/3ML nebulizer solution; 3 mL by Nebulization route every four hours as needed for Shortness of Breath.  Dispense: 30 Each; Refill: 3      Work note given - excuse for 8/5 and 8/6/2020. May return on 8/6/2020 if better.    Discussed with him DDX, management options, and risks, benefits, and alternatives to treatment plan agreed upon.    Due to severity of presentation, offered aggressive antibiotic treatment. He would like.    Agreeable to medications given and prescribed.  report checked - last Rx was Tramadol 50 mg #30 filled on 8/7/18.    In my professional judgment, after considering each of the factors set forth in , the CS is medically necessary and appropriate.    Advised to see Dentist for definitive treatment.    Discussed expected course of duration, time for improvement, and to seek follow-up in Emergency Room, urgent care, or with PCP if getting worse at any time or not improving within expected time frame.

## 2021-03-31 ENCOUNTER — OFFICE VISIT (OUTPATIENT)
Dept: URGENT CARE | Facility: PHYSICIAN GROUP | Age: 51
End: 2021-03-31
Payer: COMMERCIAL

## 2021-03-31 VITALS
HEART RATE: 78 BPM | OXYGEN SATURATION: 98 % | DIASTOLIC BLOOD PRESSURE: 68 MMHG | BODY MASS INDEX: 29.9 KG/M2 | SYSTOLIC BLOOD PRESSURE: 108 MMHG | HEIGHT: 74 IN | WEIGHT: 233 LBS | RESPIRATION RATE: 20 BRPM | TEMPERATURE: 98.5 F

## 2021-03-31 DIAGNOSIS — Z87.09 HISTORY OF ASTHMA: ICD-10-CM

## 2021-03-31 DIAGNOSIS — J45.909 ASTHMA, UNSPECIFIED ASTHMA SEVERITY, UNSPECIFIED WHETHER COMPLICATED, UNSPECIFIED WHETHER PERSISTENT: ICD-10-CM

## 2021-03-31 DIAGNOSIS — Z76.0 ENCOUNTER FOR MEDICATION REFILL: ICD-10-CM

## 2021-03-31 DIAGNOSIS — M54.41 ACUTE RIGHT-SIDED LOW BACK PAIN WITH RIGHT-SIDED SCIATICA: ICD-10-CM

## 2021-03-31 DIAGNOSIS — M62.830 LUMBAR PARASPINAL MUSCLE SPASM: ICD-10-CM

## 2021-03-31 PROCEDURE — 99213 OFFICE O/P EST LOW 20 MIN: CPT | Performed by: NURSE PRACTITIONER

## 2021-03-31 RX ORDER — ALBUTEROL SULFATE 90 UG/1
2 AEROSOL, METERED RESPIRATORY (INHALATION) EVERY 6 HOURS PRN
Qty: 8.5 G | Refills: 0 | Status: SHIPPED | OUTPATIENT
Start: 2021-03-31 | End: 2022-04-02

## 2021-03-31 RX ORDER — KETOROLAC TROMETHAMINE 30 MG/ML
30 INJECTION, SOLUTION INTRAMUSCULAR; INTRAVENOUS ONCE
Status: DISCONTINUED | OUTPATIENT
Start: 2021-03-31 | End: 2021-03-31

## 2021-03-31 RX ORDER — CYCLOBENZAPRINE HCL 10 MG
10 TABLET ORAL 3 TIMES DAILY PRN
Qty: 30 TABLET | Refills: 0 | Status: SHIPPED | OUTPATIENT
Start: 2021-03-31 | End: 2022-04-02

## 2021-03-31 RX ORDER — METHYLPREDNISOLONE 4 MG/1
TABLET ORAL
Qty: 21 TABLET | Refills: 0 | Status: SHIPPED | OUTPATIENT
Start: 2021-03-31 | End: 2022-04-02

## 2021-03-31 RX ORDER — IPRATROPIUM BROMIDE AND ALBUTEROL SULFATE 2.5; .5 MG/3ML; MG/3ML
3 SOLUTION RESPIRATORY (INHALATION) EVERY 4 HOURS PRN
Qty: 30 EACH | Refills: 0 | Status: SHIPPED | OUTPATIENT
Start: 2021-03-31 | End: 2022-04-02

## 2021-03-31 RX ORDER — KETOROLAC TROMETHAMINE 30 MG/ML
30 INJECTION, SOLUTION INTRAMUSCULAR; INTRAVENOUS ONCE
Status: COMPLETED | OUTPATIENT
Start: 2021-03-31 | End: 2021-03-31

## 2021-03-31 RX ADMIN — KETOROLAC TROMETHAMINE 30 MG: 30 INJECTION, SOLUTION INTRAMUSCULAR; INTRAVENOUS at 13:45

## 2021-03-31 ASSESSMENT — ENCOUNTER SYMPTOMS
BOWEL INCONTINENCE: 0
BACK PAIN: 1

## 2021-03-31 NOTE — PROGRESS NOTES
Subjective:      Dayton Hassan is a 51 y.o. male who presents with Back Pain (Pt states Sx started 3 days ago, Pt states he bent down, and when he came up he had severe pain in lower back. )            Back Pain  This is a new problem. Episode onset: pt reports new onset of lower back pain that started 3 days ago. He states he bent over to help his niece up that fell. He states when he stood up there was sudden, severe pain on the right side. Painful with walking. The problem is unchanged. The pain is present in the lumbar spine. The quality of the pain is described as shooting. The pain radiates to the right thigh. Pertinent negatives include no bladder incontinence or bowel incontinence. He has tried heat and NSAIDs for the symptoms. The treatment provided no relief.       Review of Systems   Gastrointestinal: Negative for bowel incontinence.   Genitourinary: Negative for bladder incontinence.   Musculoskeletal: Positive for back pain.   All other systems reviewed and are negative.    Past Medical History:   Diagnosis Date   • Asthma    • Chronic airway obstruction, not elsewhere classified     History reviewed. No pertinent surgical history.   Social History     Socioeconomic History   • Marital status: Unknown     Spouse name: Not on file   • Number of children: Not on file   • Years of education: Not on file   • Highest education level: Not on file   Occupational History   • Not on file   Tobacco Use   • Smoking status: Current Every Day Smoker     Packs/day: 2.00     Years: 40.00     Pack years: 80.00     Types: Cigarettes   • Smokeless tobacco: Never Used   • Tobacco comment: currently 1 ppd, down from 3 ppd for many years   Substance and Sexual Activity   • Alcohol use: No     Alcohol/week: 0.0 oz   • Drug use: No   • Sexual activity: Not on file   Other Topics Concern   • Not on file   Social History Narrative    ** Merged History Encounter **          Social Determinants of Health     Financial Resource  "Strain:    • Difficulty of Paying Living Expenses:    Food Insecurity:    • Worried About Running Out of Food in the Last Year:    • Ran Out of Food in the Last Year:    Transportation Needs:    • Lack of Transportation (Medical):    • Lack of Transportation (Non-Medical):    Physical Activity:    • Days of Exercise per Week:    • Minutes of Exercise per Session:    Stress:    • Feeling of Stress :    Social Connections:    • Frequency of Communication with Friends and Family:    • Frequency of Social Gatherings with Friends and Family:    • Attends Shinto Services:    • Active Member of Clubs or Organizations:    • Attends Club or Organization Meetings:    • Marital Status:    Intimate Partner Violence:    • Fear of Current or Ex-Partner:    • Emotionally Abused:    • Physically Abused:    • Sexually Abused:           Objective:     /68   Pulse 78   Temp 36.9 °C (98.5 °F) (Temporal)   Resp 20   Ht 1.88 m (6' 2\")   Wt 106 kg (233 lb)   SpO2 98%   BMI 29.92 kg/m²      Physical Exam  Vitals and nursing note reviewed.   Constitutional:       Appearance: Normal appearance. He is well-developed.   HENT:      Head: Normocephalic and atraumatic.      Right Ear: External ear normal.      Left Ear: External ear normal.      Nose: Nose normal.      Mouth/Throat:      Mouth: Mucous membranes are moist.   Eyes:      Conjunctiva/sclera: Conjunctivae normal.      Pupils: Pupils are equal, round, and reactive to light.   Cardiovascular:      Rate and Rhythm: Normal rate and regular rhythm.   Pulmonary:      Effort: Pulmonary effort is normal.   Musculoskeletal:      Cervical back: Normal range of motion.      Lumbar back: Spasms and tenderness present. Decreased range of motion (secondary to pain). Negative right straight leg raise test and negative left straight leg raise test.        Back:    Skin:     General: Skin is warm and dry.      Capillary Refill: Capillary refill takes less than 2 seconds. "   Neurological:      General: No focal deficit present.      Mental Status: He is alert and oriented to person, place, and time. Mental status is at baseline.   Psychiatric:         Mood and Affect: Mood normal.         Behavior: Behavior normal.         Thought Content: Thought content normal.                 Assessment/Plan:        1. Acute right-sided low back pain with right-sided sciatica  - ketorolac (TORADOL) injection 30 mg  - methylPREDNISolone (MEDROL DOSEPAK) 4 MG Tablet Therapy Pack; Follow schedule on package instructions.  Dispense: 21 tablet; Refill: 0    2. Lumbar paraspinal muscle spasm  - ketorolac (TORADOL) injection 30 mg  - cyclobenzaprine (FLEXERIL) 10 mg Tab; Take 1 tablet by mouth 3 times a day as needed for Muscle Spasms.  Dispense: 30 tablet; Refill: 0  - methylPREDNISolone (MEDROL DOSEPAK) 4 MG Tablet Therapy Pack; Follow schedule on package instructions.  Dispense: 21 tablet; Refill: 0    3. History of asthma    4. Encounter for medication refill  - ipratropium-albuterol (DUONEB) 0.5-2.5 (3) MG/3ML nebulizer solution; Take 3 mL by nebulization every four hours as needed for Shortness of Breath.  Dispense: 30 Each; Refill: 0  - albuterol 108 (90 Base) MCG/ACT Aero Soln inhalation aerosol; Inhale 2 Puffs every 6 hours as needed for Shortness of Breath.  Dispense: 8.5 g; Refill: 0    5. Asthma, unspecified asthma severity, unspecified whether complicated, unspecified whether persistent  - ipratropium-albuterol (DUONEB) 0.5-2.5 (3) MG/3ML nebulizer solution; Take 3 mL by nebulization every four hours as needed for Shortness of Breath.  Dispense: 30 Each; Refill: 0  - albuterol 108 (90 Base) MCG/ACT Aero Soln inhalation aerosol; Inhale 2 Puffs every 6 hours as needed for Shortness of Breath.  Dispense: 8.5 g; Refill: 0    Tolerated injection well  Sedating effects of flexeril discussed  May use tylenol and ibuprofen as needed for pain  Continue heat compresses  Advised against bed rest  Work  note provided  Supportive care, differential diagnoses, and indications for immediate follow-up discussed with patient.    Pathogenesis of diagnosis discussed including typical length and natural progression.      Instructed to return to UC or nearest emergency department if symptoms fail to improve, for any change in condition, further concerns, or new concerning symptoms.  Patient states understanding of the plan of care and discharge instructions.

## 2021-03-31 NOTE — LETTER
March 31, 2021    To Whom It May Concern:         This is confirmation that Dayton Hassan attended his scheduled appointment with PILLO Yarbrough on 3/31/21.    Please excuse him from work 3/29/21-3/31/21.    Sincerely,          ZANDER Yarbrough.  [phone]

## 2021-04-24 ENCOUNTER — APPOINTMENT (OUTPATIENT)
Dept: URGENT CARE | Facility: PHYSICIAN GROUP | Age: 51
End: 2021-04-24
Payer: COMMERCIAL

## 2021-04-24 ENCOUNTER — APPOINTMENT (OUTPATIENT)
Dept: RADIOLOGY | Facility: IMAGING CENTER | Age: 51
End: 2021-04-24
Attending: ORTHOPAEDIC SURGERY
Payer: COMMERCIAL

## 2021-04-24 DIAGNOSIS — Z01.812 PRE-OPERATIVE LABORATORY EXAMINATION: ICD-10-CM

## 2021-04-24 PROCEDURE — 71046 X-RAY EXAM CHEST 2 VIEWS: CPT | Mod: TC,FY | Performed by: NURSE PRACTITIONER

## 2021-04-27 ENCOUNTER — HOSPITAL ENCOUNTER (OUTPATIENT)
Dept: CARDIOLOGY | Facility: MEDICAL CENTER | Age: 51
End: 2021-04-27
Attending: ORTHOPAEDIC SURGERY
Payer: COMMERCIAL

## 2021-04-27 LAB — EKG IMPRESSION: NORMAL

## 2021-04-27 PROCEDURE — 93005 ELECTROCARDIOGRAM TRACING: CPT | Performed by: ORTHOPAEDIC SURGERY

## 2021-04-27 PROCEDURE — 93010 ELECTROCARDIOGRAM REPORT: CPT | Performed by: INTERNAL MEDICINE

## 2022-04-02 ENCOUNTER — OFFICE VISIT (OUTPATIENT)
Dept: URGENT CARE | Facility: PHYSICIAN GROUP | Age: 52
End: 2022-04-02
Payer: COMMERCIAL

## 2022-04-02 VITALS
RESPIRATION RATE: 16 BRPM | BODY MASS INDEX: 32.73 KG/M2 | DIASTOLIC BLOOD PRESSURE: 70 MMHG | HEIGHT: 74 IN | HEART RATE: 95 BPM | WEIGHT: 255 LBS | TEMPERATURE: 97.8 F | SYSTOLIC BLOOD PRESSURE: 114 MMHG | OXYGEN SATURATION: 94 %

## 2022-04-02 DIAGNOSIS — R51.9 NONINTRACTABLE HEADACHE, UNSPECIFIED CHRONICITY PATTERN, UNSPECIFIED HEADACHE TYPE: ICD-10-CM

## 2022-04-02 DIAGNOSIS — J44.1 COPD WITH ACUTE EXACERBATION (HCC): ICD-10-CM

## 2022-04-02 PROCEDURE — 99214 OFFICE O/P EST MOD 30 MIN: CPT | Performed by: FAMILY MEDICINE

## 2022-04-02 RX ORDER — ALBUTEROL SULFATE 90 UG/1
AEROSOL, METERED RESPIRATORY (INHALATION)
COMMUNITY
End: 2022-04-02

## 2022-04-02 RX ORDER — ALBUTEROL SULFATE 2.5 MG/3ML
2.5 SOLUTION RESPIRATORY (INHALATION) EVERY 4 HOURS PRN
Qty: 30 EACH | Refills: 5 | Status: SHIPPED | OUTPATIENT
Start: 2022-04-02 | End: 2023-01-27

## 2022-04-02 RX ORDER — IBUPROFEN 800 MG/1
TABLET ORAL
Qty: 60 TABLET | Refills: 0 | Status: SHIPPED | OUTPATIENT
Start: 2022-04-02

## 2022-04-02 RX ORDER — ALBUTEROL SULFATE 90 UG/1
AEROSOL, METERED RESPIRATORY (INHALATION)
Qty: 8.5 G | Refills: 5 | Status: SHIPPED | OUTPATIENT
Start: 2022-04-02 | End: 2023-01-27

## 2022-04-02 RX ORDER — METHYLPREDNISOLONE 4 MG/1
TABLET ORAL
Qty: 21 TABLET | Refills: 0 | Status: SHIPPED | OUTPATIENT
Start: 2022-04-02 | End: 2022-04-08

## 2022-04-02 RX ORDER — AZITHROMYCIN 250 MG/1
TABLET, FILM COATED ORAL
Qty: 6 TABLET | Refills: 0 | Status: SHIPPED | OUTPATIENT
Start: 2022-04-02 | End: 2022-04-07

## 2022-04-02 RX ORDER — IBUPROFEN 800 MG/1
800 TABLET ORAL
COMMUNITY
End: 2022-04-02

## 2022-04-02 NOTE — PROGRESS NOTES
Chief Complaint:    Chief Complaint   Patient presents with   • Shortness of Breath     Dx COPD and ASTHMA        History of Present Illness:    He has COPD and Asthma, breathing has been worse past 2-3 days, and coughing up purulent mucus. Z-harman and Medrol Dose Harman have worked and been tolerated in the past. He is requesting refill of MDI and neb solution. He is requesting Ibuprofen 800 mg to use for headaches as needed.      Past Medical History:    Past Medical History:   Diagnosis Date   • Asthma    • Chronic airway obstruction, not elsewhere classified      Past Surgical History:    History reviewed. No pertinent surgical history.    Social History:    Social History     Socioeconomic History   • Marital status: Unknown     Spouse name: Not on file   • Number of children: Not on file   • Years of education: Not on file   • Highest education level: Not on file   Occupational History   • Not on file   Tobacco Use   • Smoking status: Current Every Day Smoker     Packs/day: 2.00     Years: 40.00     Pack years: 80.00     Types: Cigarettes   • Smokeless tobacco: Never Used   • Tobacco comment: currently 1 ppd, down from 3 ppd for many years   Vaping Use   • Vaping Use: Never used   Substance and Sexual Activity   • Alcohol use: No     Alcohol/week: 0.0 oz   • Drug use: No   • Sexual activity: Not on file   Other Topics Concern   • Not on file   Social History Narrative    ** Merged History Encounter **          Social Determinants of Health     Financial Resource Strain: Not on file   Food Insecurity: Not on file   Transportation Needs: Not on file   Physical Activity: Not on file   Stress: Not on file   Social Connections: Not on file   Intimate Partner Violence: Not on file   Housing Stability: Not on file     Family History:    Family History   Problem Relation Age of Onset   • Lung Cancer Father    • Diabetes Father    • Cancer Father         bone   • Hyperlipidemia Father    • Diabetes Maternal Grandfather    •  "Diabetes Paternal Grandfather    • Diabetes Paternal Grandmother    • Diabetes Maternal Grandmother    • Asthma Mother    • Other Mother         varicose veins     Medications:    Current Outpatient Medications on File Prior to Visit   Medication Sig Dispense Refill   • albuterol (PROVENTIL) 2.5mg/0.5ml Nebu Soln Inhale 2.5 mg.     • albuterol 108 (90 Base) MCG/ACT Aero Soln inhalation aerosol Inhale 2 Puffs by mouth every four hours as needed for Shortness of Breath. 8.5 g 3     No current facility-administered medications on file prior to visit.     Allergies:    No Known Allergies      Vitals:    Vitals:    22 1255   BP: 114/70   Pulse: 95   Resp: 16   Temp: 36.6 °C (97.8 °F)   TempSrc: Temporal   SpO2: 94%   Weight: 116 kg (255 lb)   Height: 1.88 m (6' 2\")       Physical Exam:    Constitutional: Vital signs reviewed. Appears well-developed and well-nourished. No acute distress.   Eyes: Sclera white, conjunctivae clear. PERRLA.  ENT: External ears normal. Hearing normal.   Cardiovascular: Regular rate and rhythm. No murmur.  Pulmonary/Chest: Respirations non-labored. Clear to auscultation bilaterally.  Musculoskeletal: Normal gait. No muscular atrophy or weakness.  Neurological: Alert and oriented to person, place, and time. CN 2-12 intact. Muscle tone normal. Coordination normal.   Skin: No rashes or lesions. Warm, dry, normal turgor.  Psychiatric: Normal mood and affect. Behavior is normal. Judgment and thought content normal.       Medical Decision Makin. COPD with acute exacerbation (HCC)  - azithromycin (ZITHROMAX) 250 MG Tab; 2 TABS BY MOUTH ON DAY 1, 1 TAB ON DAYS 2-5.  Dispense: 6 Tablet; Refill: 0  - methylPREDNISolone (MEDROL DOSEPAK) 4 MG Tablet Therapy Pack; TAKE AS DIRECTED ON PACKAGE.  Dispense: 21 Tablet; Refill: 0  - albuterol 108 (90 Base) MCG/ACT Aero Soln inhalation aerosol; 2 PUFFS EVERY 4 HOURS ONLY IF NEEDED FOR COUGH, WHEEZING, OR SHORTNESS OF BREATH.  Dispense: 8.5 g; Refill: " 5  - albuterol (PROVENTIL) 2.5mg/3ml Nebu Soln solution for nebulization; Take 3 mL by nebulization every four hours as needed for Shortness of Breath.  Dispense: 30 Each; Refill: 5    2. Nonintractable headache, unspecified chronicity pattern, unspecified headache type  - ibuprofen (MOTRIN) 800 MG Tab; 1 TAB BY MOUTH EVERY 8 HOURS ONLY IF NEEDED FOR HEADACHE OR PAIN. TAKE WITH FOOD.  Dispense: 60 Tablet; Refill: 0      Discussed with him DDX, management options, and risks, benefits, and alternatives to treatment plan agreed upon.    He has COPD and Asthma, breathing has been worse past 2-3 days, and coughing up purulent mucus. Z-harman and Medrol Dose Harman have worked and been tolerated in the past. He is requesting refill of MDI and neb solution. He is requesting Ibuprofen 800 mg to use for headaches as needed.    COPD - chronic condition with acute exacerbation.    Agreeable to medications prescribed.    Discussed expected course of duration, time for improvement, and to seek follow-up in Emergency Room, urgent care, or with PCP if getting worse at any time or not improving within expected time frame.

## 2022-09-01 DIAGNOSIS — R51.9 NONINTRACTABLE HEADACHE, UNSPECIFIED CHRONICITY PATTERN, UNSPECIFIED HEADACHE TYPE: ICD-10-CM

## 2022-11-19 ENCOUNTER — APPOINTMENT (OUTPATIENT)
Dept: RADIOLOGY | Facility: IMAGING CENTER | Age: 52
End: 2022-11-19
Attending: NURSE PRACTITIONER
Payer: COMMERCIAL

## 2022-11-19 ENCOUNTER — OFFICE VISIT (OUTPATIENT)
Dept: URGENT CARE | Facility: PHYSICIAN GROUP | Age: 52
End: 2022-11-19
Payer: COMMERCIAL

## 2022-11-19 ENCOUNTER — HOSPITAL ENCOUNTER (OUTPATIENT)
Facility: MEDICAL CENTER | Age: 52
End: 2022-11-19
Attending: NURSE PRACTITIONER
Payer: COMMERCIAL

## 2022-11-19 VITALS
TEMPERATURE: 98.8 F | WEIGHT: 240.8 LBS | HEIGHT: 74 IN | BODY MASS INDEX: 30.9 KG/M2 | SYSTOLIC BLOOD PRESSURE: 110 MMHG | RESPIRATION RATE: 18 BRPM | HEART RATE: 106 BPM | OXYGEN SATURATION: 91 % | DIASTOLIC BLOOD PRESSURE: 62 MMHG

## 2022-11-19 DIAGNOSIS — J44.1 CHRONIC OBSTRUCTIVE PULMONARY DISEASE WITH ACUTE EXACERBATION (HCC): ICD-10-CM

## 2022-11-19 LAB
INT CON NEG: NORMAL
INT CON POS: NORMAL
S PYO AG THROAT QL: NORMAL

## 2022-11-19 PROCEDURE — 71046 X-RAY EXAM CHEST 2 VIEWS: CPT | Mod: TC,FY | Performed by: RADIOLOGY

## 2022-11-19 PROCEDURE — 0240U HCHG SARS-COV-2 COVID-19 NFCT DS RESP RNA 3 TRGT MIC: CPT

## 2022-11-19 PROCEDURE — 99214 OFFICE O/P EST MOD 30 MIN: CPT | Performed by: NURSE PRACTITIONER

## 2022-11-19 PROCEDURE — 87880 STREP A ASSAY W/OPTIC: CPT | Performed by: NURSE PRACTITIONER

## 2022-11-19 RX ORDER — ALBUTEROL SULFATE 90 UG/1
2 AEROSOL, METERED RESPIRATORY (INHALATION) EVERY 6 HOURS PRN
Qty: 8.5 G | Refills: 0 | Status: SHIPPED | OUTPATIENT
Start: 2022-11-19 | End: 2023-01-27

## 2022-11-19 RX ORDER — PREDNISONE 10 MG/1
40 TABLET ORAL DAILY
Qty: 20 TABLET | Refills: 0 | Status: SHIPPED | OUTPATIENT
Start: 2022-11-19 | End: 2022-11-24

## 2022-11-19 RX ORDER — AMOXICILLIN AND CLAVULANATE POTASSIUM 875; 125 MG/1; MG/1
1 TABLET, FILM COATED ORAL 2 TIMES DAILY
Qty: 14 TABLET | Refills: 0 | Status: SHIPPED | OUTPATIENT
Start: 2022-11-19 | End: 2022-11-26

## 2022-11-19 RX ORDER — DOXYCYCLINE HYCLATE 100 MG
100 TABLET ORAL 2 TIMES DAILY
Qty: 14 TABLET | Refills: 0 | Status: SHIPPED | OUTPATIENT
Start: 2022-11-19 | End: 2022-11-26

## 2022-11-19 RX ORDER — ALBUTEROL SULFATE 2.5 MG/3ML
2.5 SOLUTION RESPIRATORY (INHALATION) EVERY 4 HOURS PRN
Qty: 3 ML | Refills: 3 | Status: SHIPPED | OUTPATIENT
Start: 2022-11-19 | End: 2023-01-27

## 2022-11-19 ASSESSMENT — ENCOUNTER SYMPTOMS
SORE THROAT: 0
NAUSEA: 0
SPUTUM PRODUCTION: 1
MYALGIAS: 0
VOMITING: 0
EYE REDNESS: 0
DIZZINESS: 0
CHILLS: 1
FEVER: 0
WHEEZING: 1
SHORTNESS OF BREATH: 1
COUGH: 1

## 2022-11-19 NOTE — PROGRESS NOTES
Subjective:   Dayton Hassan is a 52 y.o. male who presents for Cough, Headache, Pharyngitis (X pt stated couple weeks ), Shortness of Breath, Body Aches, Chills, Fever, and Other (Wants refills on his medication)      Cough  This is a new problem. The current episode started 1 to 4 weeks ago. The problem has been gradually worsening. The problem occurs constantly. The cough is Productive of sputum and productive of purulent sputum. Associated symptoms include chills, nasal congestion, shortness of breath and wheezing. Pertinent negatives include no chest pain, eye redness, fever, myalgias, rash or sore throat. Nothing aggravates the symptoms. Risk factors for lung disease include smoking/tobacco exposure. He has tried a beta-agonist inhaler for the symptoms. The treatment provided no relief.     Review of Systems   Constitutional:  Positive for chills and malaise/fatigue. Negative for fever.   HENT:  Positive for congestion. Negative for sore throat.    Eyes:  Negative for redness.   Respiratory:  Positive for cough, sputum production, shortness of breath and wheezing.    Cardiovascular:  Negative for chest pain.   Gastrointestinal:  Negative for nausea and vomiting.   Genitourinary:  Negative for dysuria.   Musculoskeletal:  Negative for myalgias.   Skin:  Negative for rash.   Neurological:  Negative for dizziness.     Medications:    albuterol Aers  albuterol Nebu  amoxicillin-clavulanate Tabs  doxycycline Tabs  ibuprofen Tabs  predniSONE Tabs    Allergies: Patient has no known allergies.    Problem List: Dayton Hassan does not have any pertinent problems on file.    Surgical History:  No past surgical history on file.    Past Social Hx: Dayton Hassan  reports that he has been smoking cigarettes. He has a 80.00 pack-year smoking history. He has never used smokeless tobacco. He reports that he does not drink alcohol and does not use drugs.     Past Family Hx:  Dayton Hassan family history includes  "Asthma in his mother; Cancer in his father; Diabetes in his father, maternal grandfather, maternal grandmother, paternal grandfather, and paternal grandmother; Hyperlipidemia in his father; Lung Cancer in his father; Other in his mother.     Problem list, medications, and allergies reviewed by myself today in Epic.     Objective:     /62   Pulse (!) 106   Temp 37.1 °C (98.8 °F) (Temporal)   Resp 18   Ht 1.88 m (6' 2\")   Wt 109 kg (240 lb 12.8 oz)   SpO2 91%   BMI 30.92 kg/m²     Physical Exam  Vitals and nursing note reviewed.   Constitutional:       General: He is not in acute distress.     Appearance: He is well-developed.   HENT:      Head: Normocephalic and atraumatic.      Right Ear: Tympanic membrane and external ear normal.      Left Ear: Tympanic membrane and external ear normal.      Nose: Nose normal.      Right Sinus: No maxillary sinus tenderness or frontal sinus tenderness.      Left Sinus: No maxillary sinus tenderness or frontal sinus tenderness.      Mouth/Throat:      Mouth: Mucous membranes are moist.      Pharynx: Uvula midline. No posterior oropharyngeal erythema.      Tonsils: No tonsillar exudate or tonsillar abscesses.   Eyes:      General:         Right eye: No discharge.         Left eye: No discharge.      Conjunctiva/sclera: Conjunctivae normal.   Cardiovascular:      Rate and Rhythm: Normal rate.   Pulmonary:      Effort: Tachypnea and accessory muscle usage present. No respiratory distress.      Breath sounds: Wheezing and rhonchi present.   Abdominal:      General: There is no distension.   Musculoskeletal:         General: Normal range of motion.   Skin:     General: Skin is warm and dry.   Neurological:      General: No focal deficit present.      Mental Status: He is alert and oriented to person, place, and time. Mental status is at baseline.      Gait: Gait (gait at baseline) normal.   Psychiatric:         Judgment: Judgment normal.       Assessment/Plan:     Diagnosis " and associated orders:     1. Chronic obstructive pulmonary disease with acute exacerbation (HCC)  DX-CHEST-2 VIEWS    POCT Rapid Strep A    albuterol 108 (90 Base) MCG/ACT Aero Soln inhalation aerosol    predniSONE (DELTASONE) 10 MG Tab    doxycycline (VIBRAMYCIN) 100 MG Tab    amoxicillin-clavulanate (AUGMENTIN) 875-125 MG Tab    CoV-2 and Flu A/B by PCR (24 hour In-House): Collect NP swab in VTM    albuterol (PROVENTIL) 2.5mg/3ml Nebu Soln solution for nebulization    Referral to establish with [facility] PCP         Comments/MDM:   Strep negative  I independently reviewed the patient's imaging and agree with the interpretation of the radiologist.         IMPRESSION:     No radiographic evidence of acute cardiopulmonary process.      I personally reviewed prior external notes and prior test results pertinent to today's visit.   Patient has a history of COPD having an acute exacerbationdiscussed management options, risks and benefits, and alternatives to treatment plan agreed upon.   Red flags discussed and indications to immediately call 911 or present to the Emergency Department.   Supportive care, differential diagnoses, and indications for immediate follow-up discussed with patient.    Patient expresses understanding and agrees to plan. Patient denies any other questions or concerns.            Please note that this dictation was created using voice recognition software. I have made a reasonable attempt to correct obvious errors, but I expect that there are errors of grammar and possibly content that I did not discover before finalizing the note.    This note was electronically signed by Wesley WYATT.

## 2022-11-20 LAB
FLUAV RNA SPEC QL NAA+PROBE: NEGATIVE
FLUBV RNA SPEC QL NAA+PROBE: NEGATIVE
SARS-COV-2 RNA RESP QL NAA+PROBE: NOTDETECTED
SPECIMEN SOURCE: NORMAL

## 2023-01-27 ENCOUNTER — OFFICE VISIT (OUTPATIENT)
Dept: URGENT CARE | Facility: PHYSICIAN GROUP | Age: 53
End: 2023-01-27
Payer: COMMERCIAL

## 2023-01-27 VITALS
RESPIRATION RATE: 16 BRPM | HEART RATE: 90 BPM | BODY MASS INDEX: 31.95 KG/M2 | OXYGEN SATURATION: 90 % | HEIGHT: 74 IN | TEMPERATURE: 98 F | SYSTOLIC BLOOD PRESSURE: 152 MMHG | WEIGHT: 249 LBS | DIASTOLIC BLOOD PRESSURE: 76 MMHG

## 2023-01-27 DIAGNOSIS — J44.1 COPD WITH ACUTE EXACERBATION (HCC): ICD-10-CM

## 2023-01-27 PROCEDURE — 99214 OFFICE O/P EST MOD 30 MIN: CPT | Performed by: FAMILY MEDICINE

## 2023-01-27 RX ORDER — ALBUTEROL SULFATE 90 UG/1
AEROSOL, METERED RESPIRATORY (INHALATION)
Qty: 8.5 G | Refills: 11 | Status: SHIPPED | OUTPATIENT
Start: 2023-01-27 | End: 2024-01-15 | Stop reason: SDUPTHER

## 2023-01-27 RX ORDER — METHYLPREDNISOLONE 4 MG/1
TABLET ORAL
Qty: 21 TABLET | Refills: 0 | Status: SHIPPED | OUTPATIENT
Start: 2023-01-27 | End: 2023-02-02

## 2023-01-27 RX ORDER — ALBUTEROL SULFATE 2.5 MG/3ML
2.5 SOLUTION RESPIRATORY (INHALATION) EVERY 4 HOURS PRN
Qty: 30 EACH | Refills: 11 | Status: SHIPPED | OUTPATIENT
Start: 2023-01-27 | End: 2024-01-15 | Stop reason: SDUPTHER

## 2023-01-27 RX ORDER — AZITHROMYCIN 250 MG/1
TABLET, FILM COATED ORAL
Qty: 6 TABLET | Refills: 0 | Status: SHIPPED | OUTPATIENT
Start: 2023-01-27 | End: 2023-02-01

## 2023-01-27 NOTE — LETTER
January 27, 2023         Patient: Dayton Hassan   YOB: 1970   Date of Visit: 1/27/2023           To Whom it May Concern:    Dayton Hassan was seen in my clinic on 1/27/2023.     Please excuse from work for 1/27/23 due to medical condition.    If you have any questions or concerns, please don't hesitate to call.        Sincerely,           Jayesh Menjivar M.D.  Electronically Signed

## 2023-09-18 ENCOUNTER — OFFICE VISIT (OUTPATIENT)
Dept: URGENT CARE | Facility: PHYSICIAN GROUP | Age: 53
End: 2023-09-18
Payer: COMMERCIAL

## 2023-09-18 VITALS
SYSTOLIC BLOOD PRESSURE: 118 MMHG | DIASTOLIC BLOOD PRESSURE: 48 MMHG | OXYGEN SATURATION: 92 % | RESPIRATION RATE: 20 BRPM | TEMPERATURE: 97.8 F | HEART RATE: 76 BPM | HEIGHT: 74 IN | BODY MASS INDEX: 33.11 KG/M2 | WEIGHT: 258 LBS

## 2023-09-18 DIAGNOSIS — J44.1 COPD EXACERBATION (HCC): ICD-10-CM

## 2023-09-18 LAB
FLUAV RNA SPEC QL NAA+PROBE: NEGATIVE
FLUBV RNA SPEC QL NAA+PROBE: NEGATIVE
RSV RNA SPEC QL NAA+PROBE: NEGATIVE
SARS-COV-2 RNA RESP QL NAA+PROBE: NEGATIVE

## 2023-09-18 PROCEDURE — 99214 OFFICE O/P EST MOD 30 MIN: CPT | Performed by: FAMILY MEDICINE

## 2023-09-18 PROCEDURE — 0241U POCT CEPHEID COV-2, FLU A/B, RSV - PCR: CPT | Performed by: FAMILY MEDICINE

## 2023-09-18 PROCEDURE — 3074F SYST BP LT 130 MM HG: CPT | Performed by: FAMILY MEDICINE

## 2023-09-18 PROCEDURE — 3078F DIAST BP <80 MM HG: CPT | Performed by: FAMILY MEDICINE

## 2023-09-18 RX ORDER — METHYLPREDNISOLONE 4 MG/1
TABLET ORAL
Qty: 21 TABLET | Refills: 0 | Status: SHIPPED | OUTPATIENT
Start: 2023-09-18 | End: 2023-10-28

## 2023-09-18 RX ORDER — AZITHROMYCIN 250 MG/1
TABLET, FILM COATED ORAL
Qty: 6 TABLET | Refills: 0 | Status: CANCELLED | OUTPATIENT
Start: 2023-09-18

## 2023-09-18 RX ORDER — AZITHROMYCIN 250 MG/1
TABLET, FILM COATED ORAL
Qty: 6 TABLET | Refills: 0 | Status: SHIPPED | OUTPATIENT
Start: 2023-09-18 | End: 2023-10-28

## 2023-09-18 RX ORDER — DIAZEPAM 5 MG/1
TABLET ORAL
COMMUNITY
End: 2023-10-28

## 2023-09-18 RX ORDER — ALBUTEROL SULFATE 0.63 MG/3ML
0.63 SOLUTION RESPIRATORY (INHALATION) EVERY 4 HOURS PRN
Qty: 150 ML | Refills: 0 | Status: SHIPPED | OUTPATIENT
Start: 2023-09-18 | End: 2023-10-28

## 2023-09-18 RX ORDER — ALBUTEROL SULFATE 90 UG/1
2 AEROSOL, METERED RESPIRATORY (INHALATION) EVERY 6 HOURS PRN
Qty: 1 EACH | Refills: 0 | Status: SHIPPED | OUTPATIENT
Start: 2023-09-18 | End: 2023-10-28

## 2023-09-18 NOTE — PROGRESS NOTES
"      Chief Complaint   Patient presents with    Medication Refill     Asthma and COPD medication.   SOB DX COPD           Cough  This is a new problem. The current episode started 1 week ago. The problem has been gradually worsening. The problem occurs constantly. The cough is productive of sputum. Associated symptoms include: low grade fever, wheezing. Pertinent negatives include no   headaches, sweats, weight loss. Nothing aggravates the symptoms.  Patient has tried albuterol, atrovent for the symptoms - minor improvement.   Has hx of COPD         Social History     Tobacco Use    Smoking status: Every Day     Current packs/day: 2.00     Average packs/day: 2.0 packs/day for 40.0 years (80.0 ttl pk-yrs)     Types: Cigarettes    Smokeless tobacco: Never    Tobacco comments:     currently 1 ppd, down from 3 ppd for many years   Vaping Use    Vaping Use: Never used   Substance Use Topics    Alcohol use: No     Alcohol/week: 0.0 oz    Drug use: No           Past Medical History:   Diagnosis Date    Asthma     Chronic airway obstruction, not elsewhere classified          Family History   Problem Relation Age of Onset    Lung Cancer Father     Diabetes Father     Cancer Father         bone    Hyperlipidemia Father     Diabetes Maternal Grandfather     Diabetes Paternal Grandfather     Diabetes Paternal Grandmother     Diabetes Maternal Grandmother     Asthma Mother     Other Mother         varicose veins           Review of Systems    HENT: negative for ear pain.    Cardiovascular - denies chest pain    Respiratory: Positive for cough.  Cough is productive.  +  for wheezing.    Neurological: Negative for headaches.   GI - denies nausea, vomiting or diarrhea  Neuro - denies numbness or tingling.            Objective:     /48   Pulse 76   Temp 36.6 °C (97.8 °F) (Temporal)   Resp 20   Ht 1.88 m (6' 2\")   Wt 117 kg (258 lb)   SpO2 92%       Physical Exam   Constitutional: patient is oriented to person, place, " and time. Patient appears well-developed and well-nourished. No distress.   HENT:   Head: Normocephalic and atraumatic.   Right Ear: External ear normal.   Left Ear: External ear normal.   Nose: Mucosal edema and rhinorrhea present. Right sinus exhibits no maxillary sinus tenderness. Left sinus exhibits no maxillary sinus tenderness.   Mouth/Throat: Mucous membranes are normal. No oral lesions. Posterior oropharyngeal erythema present. No oropharyngeal exudate or posterior oropharyngeal edema.   Eyes: Conjunctivae and EOM are normal. Pupils are equal, round, and reactive to light. Right eye exhibits no discharge. Left eye exhibits no discharge. No scleral icterus.   Neck: Normal range of motion. Neck supple. No tracheal deviation present.   Cardiovascular: Normal rate, regular rhythm and normal heart sounds.  Exam reveals no friction rub.    Pulmonary/Chest: Effort normal. No respiratory distress.  Patient has  wheezing. Patient has no rales or rhonchi.    Musculoskeletal:  exhibits no edema.      Neurological: patient is alert and oriented to person, place, and time.   Skin: Skin is warm and dry. No rash noted. No erythema.   Psychiatric: patient  has a normal mood and affect.  behavior is normal.   Nursing note and vitals reviewed.              Assessment/Plan:      1. COPD exacerbation (HCC)      Will screen for COVID, but pt is vaccinated.       - methylPREDNISolone (MEDROL DOSEPAK) 4 MG Tablet Therapy Pack; Follow schedule on package instructions.  Dispense: 21 Tablet; Refill: 0  - albuterol 108 (90 Base) MCG/ACT Aero Soln inhalation aerosol; Inhale 2 Puffs every 6 hours as needed for Shortness of Breath.  Dispense: 1 Each; Refill: 0    - albuterol (ACCUNEB) 0.63 MG/3ML nebulizer solution; Take 3 mL by nebulization every four hours as needed for Shortness of Breath.  Dispense: 150 mL; Refill: 0      - Referral back to [facility]CP         Supportive care, differential diagnoses, and indications for immediate  follow-up discussed with patient.   Pathogenesis of diagnosis discussed including typical length and natural progression.   Instructed to return to clinic or nearest emergency department for any change in condition, further concerns, or worsening of symptoms.  Patient states understanding of the plan of care and discharge instructions.

## 2023-09-18 NOTE — LETTER
September 18, 2023         Patient: Dayton Hassan   YOB: 1970   Date of Visit: 9/18/2023           To Whom it May Concern:    Dayton Hassan was seen in my clinic on 9/18/2023. He may return to work tomorrow.    If you have any questions or concerns, please don't hesitate to call.        Sincerely,           Adama Gill M.D.  Electronically Signed

## 2023-09-21 ENCOUNTER — TELEPHONE (OUTPATIENT)
Dept: HEALTH INFORMATION MANAGEMENT | Facility: OTHER | Age: 53
End: 2023-09-21
Payer: COMMERCIAL

## 2023-10-28 ENCOUNTER — OFFICE VISIT (OUTPATIENT)
Dept: URGENT CARE | Facility: PHYSICIAN GROUP | Age: 53
End: 2023-10-28
Payer: COMMERCIAL

## 2023-10-28 VITALS
OXYGEN SATURATION: 94 % | SYSTOLIC BLOOD PRESSURE: 112 MMHG | RESPIRATION RATE: 16 BRPM | WEIGHT: 260 LBS | DIASTOLIC BLOOD PRESSURE: 74 MMHG | TEMPERATURE: 97.9 F | HEIGHT: 74 IN | BODY MASS INDEX: 33.37 KG/M2 | HEART RATE: 93 BPM

## 2023-10-28 DIAGNOSIS — F17.200 CURRENT SMOKER: ICD-10-CM

## 2023-10-28 DIAGNOSIS — J44.9 CHRONIC OBSTRUCTIVE PULMONARY DISEASE, UNSPECIFIED COPD TYPE (HCC): ICD-10-CM

## 2023-10-28 DIAGNOSIS — J45.50 SEVERE PERSISTENT ASTHMA WITHOUT COMPLICATION: ICD-10-CM

## 2023-10-28 PROCEDURE — 3078F DIAST BP <80 MM HG: CPT | Performed by: FAMILY MEDICINE

## 2023-10-28 PROCEDURE — 99214 OFFICE O/P EST MOD 30 MIN: CPT | Performed by: FAMILY MEDICINE

## 2023-10-28 PROCEDURE — 3074F SYST BP LT 130 MM HG: CPT | Performed by: FAMILY MEDICINE

## 2023-10-28 RX ORDER — ALBUTEROL SULFATE 2.5 MG/3ML
2.5 SOLUTION RESPIRATORY (INHALATION) EVERY 6 HOURS PRN
Qty: 90 EACH | Refills: 2 | Status: SHIPPED | OUTPATIENT
Start: 2023-10-28 | End: 2024-01-15

## 2023-10-28 RX ORDER — DEXAMETHASONE SODIUM PHOSPHATE 4 MG/ML
8 INJECTION, SOLUTION INTRA-ARTICULAR; INTRALESIONAL; INTRAMUSCULAR; INTRAVENOUS; SOFT TISSUE ONCE
Status: COMPLETED | OUTPATIENT
Start: 2023-10-28 | End: 2023-10-28

## 2023-10-28 RX ORDER — BUDESONIDE AND FORMOTEROL FUMARATE DIHYDRATE 80; 4.5 UG/1; UG/1
2 AEROSOL RESPIRATORY (INHALATION) 2 TIMES DAILY
Qty: 1 EACH | Refills: 3 | Status: SHIPPED | OUTPATIENT
Start: 2023-10-28 | End: 2024-01-15

## 2023-10-28 RX ORDER — ALBUTEROL SULFATE 90 UG/1
2 AEROSOL, METERED RESPIRATORY (INHALATION) EVERY 6 HOURS PRN
Qty: 8.5 G | Refills: 3 | Status: SHIPPED | OUTPATIENT
Start: 2023-10-28 | End: 2024-01-15

## 2023-10-28 RX ADMIN — DEXAMETHASONE SODIUM PHOSPHATE 8 MG: 4 INJECTION, SOLUTION INTRA-ARTICULAR; INTRALESIONAL; INTRAMUSCULAR; INTRAVENOUS; SOFT TISSUE at 11:25

## 2023-10-28 ASSESSMENT — ENCOUNTER SYMPTOMS
SHORTNESS OF BREATH: 1
WHEEZING: 1

## 2023-10-28 NOTE — PROGRESS NOTES
Subjective     Dayton Hassan is a 53 y.o. male who presents with Medication Refill (Albuterol inhaler and Nebulizer Duo Neb solution for COPD. )      - This is a very pleasant 53 y.o. who has come to the walk-in clinic today for needs refills of inhalers for asthma/copd. Ran out and has been acting up this week (increased coughing and wheezing and non-bloody sputum). Used to use advair but to expensive, willing to try Symbicort and see if cheaper. No nvfc/cp.           ALLERGIES:  Patient has no known allergies.     PMH:  Past Medical History:   Diagnosis Date    Asthma     Chronic airway obstruction, not elsewhere classified         PSH:  History reviewed. No pertinent surgical history.    MEDS:    Current Outpatient Medications:     albuterol (PROAIR HFA) 108 (90 Base) MCG/ACT Aero Soln inhalation aerosol, Inhale 2 Puffs every 6 hours as needed (cough/wheeze)., Disp: 8.5 g, Rfl: 3    albuterol (PROVENTIL) 2.5mg/3ml Nebu Soln solution for nebulization, Take 3 mL by nebulization every 6 hours as needed (cough/wheeze)., Disp: 90 Each, Rfl: 2    budesonide-formoterol (SYMBICORT) 80-4.5 MCG/ACT Aerosol, Inhale 2 Puffs 2 times a day., Disp: 1 Each, Rfl: 3    albuterol 108 (90 Base) MCG/ACT Aero Soln inhalation aerosol, 2 PUFFS EVERY 4 HOURS ONLY IF NEEDED FOR COUGH, WHEEZING, OR SHORTNESS OF BREATH., Disp: 8.5 g, Rfl: 11    albuterol (PROVENTIL) 2.5mg/3ml Nebu Soln solution for nebulization, Take 3 mL by nebulization every four hours as needed for Shortness of Breath., Disp: 30 Each, Rfl: 11    ibuprofen (MOTRIN) 800 MG Tab, 1 TAB BY MOUTH EVERY 8 HOURS ONLY IF NEEDED FOR HEADACHE OR PAIN. TAKE WITH FOOD., Disp: 60 Tablet, Rfl: 0    Current Facility-Administered Medications:     dexamethasone (Decadron) injection 8 mg, 8 mg, Oral, Once, Zach Mariano M.D.    ** I have documented what I find to be significant in regards to past medical, social, family and surgical history  in my HPI or under PMH/PSH/FH review  "section, otherwise it is noncontributory **         HPI    Review of Systems   Respiratory:  Positive for shortness of breath and wheezing.    All other systems reviewed and are negative.             Objective     /74   Pulse 93   Temp 36.6 °C (97.9 °F) (Temporal)   Resp 16   Ht 1.88 m (6' 2\")   Wt 118 kg (260 lb)   SpO2 94%   BMI 33.38 kg/m²      Physical Exam  Vitals and nursing note reviewed.   Constitutional:       General: He is not in acute distress.     Appearance: Normal appearance. He is well-developed.   HENT:      Head: Normocephalic.      Mouth/Throat:      Mouth: Mucous membranes are moist.      Pharynx: Oropharynx is clear.   Cardiovascular:      Heart sounds: Normal heart sounds. No murmur heard.  Pulmonary:      Effort: Pulmonary effort is normal. No respiratory distress.      Breath sounds: Wheezing present.   Neurological:      Mental Status: He is alert.      Motor: No abnormal muscle tone.   Psychiatric:         Mood and Affect: Mood normal.         Behavior: Behavior normal.                             Assessment & Plan     1. Current smoker  Referral to establish with [facility] PCP      2. Severe persistent asthma without complication  dexamethasone (Decadron) injection 8 mg    albuterol (PROAIR HFA) 108 (90 Base) MCG/ACT Aero Soln inhalation aerosol    albuterol (PROVENTIL) 2.5mg/3ml Nebu Soln solution for nebulization    budesonide-formoterol (SYMBICORT) 80-4.5 MCG/ACT Aerosol    Referral to establish with [facility] PCP      3. Chronic obstructive pulmonary disease, unspecified COPD type (HCC)  dexamethasone (Decadron) injection 8 mg    albuterol (PROAIR HFA) 108 (90 Base) MCG/ACT Aero Soln inhalation aerosol    albuterol (PROVENTIL) 2.5mg/3ml Nebu Soln solution for nebulization    budesonide-formoterol (SYMBICORT) 80-4.5 MCG/ACT Aerosol    Referral to establish with [facility] PCP          - Dx, plan & d/c instructions discussed   - Rest, stay hydrated  - E.R. precautions discussed "     Follow up with your regular primary care providers office in a week for a recheck on today's visit. ER if not improving in 2-3 days or if feeling/getting worse. (If you do not have a primary care provider and need to schedule one you may call [facility] at [phone] to do this).    Any realistic side effects of medications that may have been given today reviewed.     Patient left in stable condition     Pertinent prior lab work and/or imaging studies in Epic have been reviewed by me today on day of this visit and taken into account for my treatment and plan today    Pertinent PMH/PSH and/or chronic conditions and medications if any were reviewed today and taken into account for my treatment and plan today    Pertinent prior office visit notes in Epic have been reviewed by me today on day of this visit.    Please note that this dictation may have been created using voice recognition software, if so I have made every reasonable attempt to correct obvious errors, but I expect that there are errors of grammar and possibly content that I did not discover before finalizing the note.

## 2023-11-02 ENCOUNTER — TELEPHONE (OUTPATIENT)
Dept: HEALTH INFORMATION MANAGEMENT | Facility: OTHER | Age: 53
End: 2023-11-02
Payer: COMMERCIAL

## 2024-01-12 SDOH — ECONOMIC STABILITY: HOUSING INSECURITY: IN THE LAST 12 MONTHS, HOW MANY PLACES HAVE YOU LIVED?: 1

## 2024-01-12 SDOH — ECONOMIC STABILITY: INCOME INSECURITY: HOW HARD IS IT FOR YOU TO PAY FOR THE VERY BASICS LIKE FOOD, HOUSING, MEDICAL CARE, AND HEATING?: SOMEWHAT HARD

## 2024-01-12 SDOH — ECONOMIC STABILITY: TRANSPORTATION INSECURITY
IN THE PAST 12 MONTHS, HAS THE LACK OF TRANSPORTATION KEPT YOU FROM MEDICAL APPOINTMENTS OR FROM GETTING MEDICATIONS?: NO

## 2024-01-12 SDOH — ECONOMIC STABILITY: FOOD INSECURITY: WITHIN THE PAST 12 MONTHS, YOU WORRIED THAT YOUR FOOD WOULD RUN OUT BEFORE YOU GOT MONEY TO BUY MORE.: PATIENT DECLINED

## 2024-01-12 SDOH — ECONOMIC STABILITY: HOUSING INSECURITY
IN THE LAST 12 MONTHS, WAS THERE A TIME WHEN YOU DID NOT HAVE A STEADY PLACE TO SLEEP OR SLEPT IN A SHELTER (INCLUDING NOW)?: NO

## 2024-01-12 SDOH — ECONOMIC STABILITY: INCOME INSECURITY: IN THE LAST 12 MONTHS, WAS THERE A TIME WHEN YOU WERE NOT ABLE TO PAY THE MORTGAGE OR RENT ON TIME?: NO

## 2024-01-12 SDOH — ECONOMIC STABILITY: FOOD INSECURITY: WITHIN THE PAST 12 MONTHS, THE FOOD YOU BOUGHT JUST DIDN'T LAST AND YOU DIDN'T HAVE MONEY TO GET MORE.: PATIENT DECLINED

## 2024-01-12 SDOH — ECONOMIC STABILITY: TRANSPORTATION INSECURITY
IN THE PAST 12 MONTHS, HAS LACK OF TRANSPORTATION KEPT YOU FROM MEETINGS, WORK, OR FROM GETTING THINGS NEEDED FOR DAILY LIVING?: NO

## 2024-01-12 SDOH — ECONOMIC STABILITY: TRANSPORTATION INSECURITY
IN THE PAST 12 MONTHS, HAS LACK OF RELIABLE TRANSPORTATION KEPT YOU FROM MEDICAL APPOINTMENTS, MEETINGS, WORK OR FROM GETTING THINGS NEEDED FOR DAILY LIVING?: NO

## 2024-01-12 SDOH — HEALTH STABILITY: MENTAL HEALTH
STRESS IS WHEN SOMEONE FEELS TENSE, NERVOUS, ANXIOUS, OR CAN'T SLEEP AT NIGHT BECAUSE THEIR MIND IS TROUBLED. HOW STRESSED ARE YOU?: NOT AT ALL

## 2024-01-12 SDOH — HEALTH STABILITY: PHYSICAL HEALTH: ON AVERAGE, HOW MANY MINUTES DO YOU ENGAGE IN EXERCISE AT THIS LEVEL?: PATIENT DECLINED

## 2024-01-12 SDOH — HEALTH STABILITY: PHYSICAL HEALTH: ON AVERAGE, HOW MANY DAYS PER WEEK DO YOU ENGAGE IN MODERATE TO STRENUOUS EXERCISE (LIKE A BRISK WALK)?: 5 DAYS

## 2024-01-12 ASSESSMENT — SOCIAL DETERMINANTS OF HEALTH (SDOH)
HOW OFTEN DO YOU ATTENT MEETINGS OF THE CLUB OR ORGANIZATION YOU BELONG TO?: MORE THAN 4 TIMES PER YEAR
HOW OFTEN DO YOU HAVE A DRINK CONTAINING ALCOHOL: NEVER
HOW OFTEN DO YOU ATTEND CHURCH OR RELIGIOUS SERVICES?: NEVER
HOW OFTEN DO YOU ATTEND CHURCH OR RELIGIOUS SERVICES?: NEVER
DO YOU BELONG TO ANY CLUBS OR ORGANIZATIONS SUCH AS CHURCH GROUPS UNIONS, FRATERNAL OR ATHLETIC GROUPS, OR SCHOOL GROUPS?: YES
HOW OFTEN DO YOU ATTENT MEETINGS OF THE CLUB OR ORGANIZATION YOU BELONG TO?: MORE THAN 4 TIMES PER YEAR
HOW OFTEN DO YOU GET TOGETHER WITH FRIENDS OR RELATIVES?: ONCE A WEEK
IN A TYPICAL WEEK, HOW MANY TIMES DO YOU TALK ON THE PHONE WITH FAMILY, FRIENDS, OR NEIGHBORS?: THREE TIMES A WEEK
HOW HARD IS IT FOR YOU TO PAY FOR THE VERY BASICS LIKE FOOD, HOUSING, MEDICAL CARE, AND HEATING?: SOMEWHAT HARD
DO YOU BELONG TO ANY CLUBS OR ORGANIZATIONS SUCH AS CHURCH GROUPS UNIONS, FRATERNAL OR ATHLETIC GROUPS, OR SCHOOL GROUPS?: YES
HOW MANY DRINKS CONTAINING ALCOHOL DO YOU HAVE ON A TYPICAL DAY WHEN YOU ARE DRINKING: PATIENT DOES NOT DRINK
WITHIN THE PAST 12 MONTHS, YOU WORRIED THAT YOUR FOOD WOULD RUN OUT BEFORE YOU GOT THE MONEY TO BUY MORE: PATIENT DECLINED
HOW OFTEN DO YOU HAVE SIX OR MORE DRINKS ON ONE OCCASION: NEVER
IN A TYPICAL WEEK, HOW MANY TIMES DO YOU TALK ON THE PHONE WITH FAMILY, FRIENDS, OR NEIGHBORS?: THREE TIMES A WEEK
HOW OFTEN DO YOU GET TOGETHER WITH FRIENDS OR RELATIVES?: ONCE A WEEK

## 2024-01-12 ASSESSMENT — LIFESTYLE VARIABLES
HOW OFTEN DO YOU HAVE SIX OR MORE DRINKS ON ONE OCCASION: NEVER
AUDIT-C TOTAL SCORE: 0
HOW MANY STANDARD DRINKS CONTAINING ALCOHOL DO YOU HAVE ON A TYPICAL DAY: PATIENT DOES NOT DRINK
HOW OFTEN DO YOU HAVE A DRINK CONTAINING ALCOHOL: NEVER
SKIP TO QUESTIONS 9-10: 1

## 2024-01-15 ENCOUNTER — OFFICE VISIT (OUTPATIENT)
Dept: MEDICAL GROUP | Facility: CLINIC | Age: 54
End: 2024-01-15
Payer: COMMERCIAL

## 2024-01-15 VITALS
BODY MASS INDEX: 35.7 KG/M2 | HEART RATE: 83 BPM | SYSTOLIC BLOOD PRESSURE: 120 MMHG | TEMPERATURE: 98.5 F | OXYGEN SATURATION: 93 % | DIASTOLIC BLOOD PRESSURE: 70 MMHG | HEIGHT: 73 IN | WEIGHT: 269.4 LBS | RESPIRATION RATE: 20 BRPM

## 2024-01-15 DIAGNOSIS — Z11.4 SCREENING FOR HIV (HUMAN IMMUNODEFICIENCY VIRUS): ICD-10-CM

## 2024-01-15 DIAGNOSIS — J45.50 SEVERE PERSISTENT ASTHMA WITHOUT COMPLICATION: ICD-10-CM

## 2024-01-15 DIAGNOSIS — E78.2 MIXED HYPERLIPIDEMIA: ICD-10-CM

## 2024-01-15 DIAGNOSIS — J44.9 CHRONIC OBSTRUCTIVE PULMONARY DISEASE, UNSPECIFIED COPD TYPE (HCC): ICD-10-CM

## 2024-01-15 DIAGNOSIS — Z11.59 NEED FOR HEPATITIS C SCREENING TEST: ICD-10-CM

## 2024-01-15 DIAGNOSIS — J44.1 COPD WITH ACUTE EXACERBATION (HCC): ICD-10-CM

## 2024-01-15 DIAGNOSIS — Z13.21 ENCOUNTER FOR VITAMIN DEFICIENCY SCREENING: ICD-10-CM

## 2024-01-15 DIAGNOSIS — Z96.82 NEUROSTIMULATOR DEVICE IN SITU: ICD-10-CM

## 2024-01-15 DIAGNOSIS — Z12.5 PROSTATE CANCER SCREENING: ICD-10-CM

## 2024-01-15 PROBLEM — F07.81 POSTCONCUSSION SYNDROME: Status: ACTIVE | Noted: 2024-01-15

## 2024-01-15 PROBLEM — F41.9 ANXIETY: Status: ACTIVE | Noted: 2023-08-06

## 2024-01-15 PROBLEM — G89.4 CHRONIC PAIN SYNDROME: Status: ACTIVE | Noted: 2023-03-26

## 2024-01-15 PROBLEM — M47.816 LUMBAR SPONDYLOSIS: Status: ACTIVE | Noted: 2023-05-08

## 2024-01-15 PROBLEM — M47.812 CERVICAL SPONDYLOSIS: Status: ACTIVE | Noted: 2023-06-19

## 2024-01-15 PROBLEM — M54.16 LUMBAR RADICULOPATHY: Status: ACTIVE | Noted: 2023-03-26

## 2024-01-15 PROCEDURE — 99214 OFFICE O/P EST MOD 30 MIN: CPT | Performed by: PHYSICIAN ASSISTANT

## 2024-01-15 PROCEDURE — 3074F SYST BP LT 130 MM HG: CPT | Performed by: PHYSICIAN ASSISTANT

## 2024-01-15 PROCEDURE — 3078F DIAST BP <80 MM HG: CPT | Performed by: PHYSICIAN ASSISTANT

## 2024-01-15 RX ORDER — ALBUTEROL SULFATE 2.5 MG/3ML
2.5 SOLUTION RESPIRATORY (INHALATION) EVERY 4 HOURS PRN
Qty: 120 EACH | Refills: 2 | Status: SHIPPED | OUTPATIENT
Start: 2024-01-15

## 2024-01-15 RX ORDER — MOXIFLOXACIN 5 MG/ML
1 SOLUTION/ DROPS OPHTHALMIC 4 TIMES DAILY
COMMUNITY
Start: 2023-10-24

## 2024-01-15 RX ORDER — ALBUTEROL SULFATE 90 UG/1
AEROSOL, METERED RESPIRATORY (INHALATION)
Qty: 54 G | Refills: 1 | Status: SHIPPED | OUTPATIENT
Start: 2024-01-15

## 2024-01-15 RX ORDER — FLUTICASONE FUROATE, UMECLIDINIUM BROMIDE AND VILANTEROL TRIFENATATE 100; 62.5; 25 UG/1; UG/1; UG/1
1 POWDER RESPIRATORY (INHALATION) DAILY
Qty: 90 EACH | Refills: 1 | Status: SHIPPED | OUTPATIENT
Start: 2024-01-15 | End: 2024-02-15

## 2024-01-15 RX ORDER — EPINEPHRINE 0.3 MG/.3ML
INJECTION SUBCUTANEOUS
Qty: 1 EACH | Refills: 5 | Status: SHIPPED | OUTPATIENT
Start: 2024-01-15

## 2024-01-15 RX ORDER — PREDNISONE 20 MG/1
20 TABLET ORAL DAILY
Qty: 5 TABLET | Refills: 0 | Status: SHIPPED | OUTPATIENT
Start: 2024-01-15 | End: 2024-01-20

## 2024-01-15 ASSESSMENT — PATIENT HEALTH QUESTIONNAIRE - PHQ9
CLINICAL INTERPRETATION OF PHQ2 SCORE: 3
SUM OF ALL RESPONSES TO PHQ QUESTIONS 1-9: 3
5. POOR APPETITE OR OVEREATING: 0 - NOT AT ALL

## 2024-01-15 NOTE — PROGRESS NOTES
cc:  copd    Subjective:     Dayton Hassan is a 53 y.o. male presenting for copd      Patient presents to the office for copd.   Patient states that he would like to see a specialist but is dealing with a workman's comp issue for his back and would like for this to be resolved first.  He is also declining a lung cancer screen.   Patient indicates that he does have COPD and is currently in an exacerbation.  He also has severe asthma.  His mother uses breast tree and is interested in trying this.  Patient indicates multiple allergies and also indicates having exacerbation at this time and requesting treatment as he has a white phlegmy productive cough.    Patient is also due for routine lab work.  He does have mixed hyperlipidemia but is also due for a PSA screen, vitamin D screening, hepatitis C and HIV screening.    He does indicate that he is seeing Workmen's Comp. for his back and also has a neurostimulator that was recently placed.      Review of systems:  See above.   Denies any symptoms unless previously indicated.        Current Outpatient Medications:     moxifloxacin (VIGAMOX) 0.5 % Solution, Administer 1 Drop into the right eye 4 times a day., Disp: , Rfl:     albuterol (PROVENTIL) 2.5mg/3ml Nebu Soln solution for nebulization, Take 3 mL by nebulization every four hours as needed for Shortness of Breath., Disp: 120 Each, Rfl: 2    albuterol 108 (90 Base) MCG/ACT Aero Soln inhalation aerosol, 2 PUFFS EVERY 4 HOURS ONLY IF NEEDED FOR COUGH, WHEEZING, OR SHORTNESS OF BREATH., Disp: 54 g, Rfl: 1    fluticasone-umeclidinium-vilanterol (TRELEGY ELLIPTA) 100-62.5-25 mcg/act inhaler, Inhale 1 Puff every day., Disp: 90 Each, Rfl: 1    EPINEPHrine (EPIPEN) 0.3 MG/0.3ML Solution Auto-injector solution for injection, Inject 0.3 mL into the thigh one time for 1 dose, Disp: 1 Each, Rfl: 5    predniSONE (DELTASONE) 20 MG Tab, Take 1 Tablet by mouth every day for 5 days., Disp: 5 Tablet, Rfl: 0    ibuprofen (MOTRIN) 800  "MG Tab, 1 TAB BY MOUTH EVERY 8 HOURS ONLY IF NEEDED FOR HEADACHE OR PAIN. TAKE WITH FOOD., Disp: 60 Tablet, Rfl: 0    Allergies, past medical history, past surgical history, family history, social history reviewed and updated    Objective:     Vitals: /70 (BP Location: Left arm, Patient Position: Sitting, BP Cuff Size: Adult)   Pulse 83   Temp 36.9 °C (98.5 °F) (Temporal)   Resp 20   Ht 1.842 m (6' 0.5\")   Wt 122 kg (269 lb 6.4 oz)   SpO2 93%   BMI 36.04 kg/m²   General: Alert, pleasant, NAD  EYES:   PERRL, EOMI, no icterus or pallor.  Conjunctivae and lids normal.   HENT:  Normocephalic.  External ears normal.  Neck supple.   .  Heart: Regular rate and rhythm.  S1 and S2 normal.  No murmurs appreciated.  Respiratory: Increased respiratory effort with wheezing and decreased breath sounds all lung fields.    Abdomen: obese  Skin: Warm, dry, no rashes.  Musculoskeletal: Gait is normal.  Moves all extremities well.    Extremities: Normal range of motion all extremities.   Neurological: No tremors, sensation grossly intact,  CN2-12 intact.  Psych:  Affect/mood is normal, judgement is good, memory is intact, grooming is appropriate.    Assessment/Plan:     Dayton was seen today for establish care and medication refill.    Diagnoses and all orders for this visit:    COPD with acute exacerbation (HCC)  -     albuterol (PROVENTIL) 2.5mg/3ml Nebu Soln solution for nebulization; Take 3 mL by nebulization every four hours as needed for Shortness of Breath.  -     albuterol 108 (90 Base) MCG/ACT Aero Soln inhalation aerosol; 2 PUFFS EVERY 4 HOURS ONLY IF NEEDED FOR COUGH, WHEEZING, OR SHORTNESS OF BREATH.  -     fluticasone-umeclidinium-vilanterol (TRELEGY ELLIPTA) 100-62.5-25 mcg/act inhaler; Inhale 1 Puff every day.  -     EPINEPHrine (EPIPEN) 0.3 MG/0.3ML Solution Auto-injector solution for injection; Inject 0.3 mL into the thigh one time for 1 dose  -     predniSONE (DELTASONE) 20 MG Tab; Take 1 Tablet by mouth " every day for 5 days.  Severe persistent asthma without complication  Chronic obstructive pulmonary disease, unspecified COPD type (HCC)    We will refill patient's medications including his albuterol for his nebulizer as well as his inhaler.  Will switch Symbicort to Trelegy.  Patient would like to give this a try.  Will also start him on prednisone 20 mg daily for 5 days.  Would like to follow-up in approximately 2 to 4 weeks, sooner if needed.  Patient does indicate that he has had episodes where he feels his throat narrowing and it is difficult to get air in although he can get it out.  Will prescribe patient an EpiPen as well.    Mixed hyperlipidemia  -     TSH WITH REFLEX TO FT4; Future  -     Comp Metabolic Panel; Future  -     Lipid Profile; Future    Lab work has been ordered to evaluate further.    Neurostimulator device in situ    Patient to follow-up with his specialist and with Workmen's Comp.    Prostate cancer screening  -     PROSTATE SPECIFIC AG SCREENING; Future  Encounter for vitamin deficiency screening  -     VITAMIN D,25 HYDROXY (DEFICIENCY); Future  Need for hepatitis C screening test  -     HEP C VIRUS ANTIBODY; Future  Screening for HIV (human immunodeficiency virus)  -     HIV AG/AB COMBO ASSAY SCREENING; Future    Routine testing ordered to evaluate further.  Follow-up in 2 to 4 weeks.    Other orders  -     Cancel: INFLUENZA VACCINE QUAD INJ (PF)  -     Cancel: Pneumococcal Conjugate Vaccine 20-Valent (6 wks+)            Return in about 4 weeks (around 2/12/2024), or if symptoms worsen or fail to improve, for 2-4 weeks inhaler and labs.    Please note that this dictation was created using voice recognition software. I have made every reasonable attempt to correct obvious errors, but expect that there are errors of grammar and possible content that I did not discover before finalizing note.

## 2024-01-24 ENCOUNTER — HOSPITAL ENCOUNTER (OUTPATIENT)
Facility: MEDICAL CENTER | Age: 54
End: 2024-01-24
Attending: PHYSICIAN ASSISTANT
Payer: COMMERCIAL

## 2024-01-24 ENCOUNTER — NON-PROVIDER VISIT (OUTPATIENT)
Dept: MEDICAL GROUP | Facility: CLINIC | Age: 54
End: 2024-01-24
Payer: COMMERCIAL

## 2024-01-24 DIAGNOSIS — Z13.21 ENCOUNTER FOR VITAMIN DEFICIENCY SCREENING: ICD-10-CM

## 2024-01-24 DIAGNOSIS — E78.2 MIXED HYPERLIPIDEMIA: ICD-10-CM

## 2024-01-24 LAB
25(OH)D3 SERPL-MCNC: 20 NG/ML (ref 30–100)
ALBUMIN SERPL BCP-MCNC: 4.5 G/DL (ref 3.2–4.9)
ALBUMIN/GLOB SERPL: 1.6 G/DL
ALP SERPL-CCNC: 86 U/L (ref 30–99)
ALT SERPL-CCNC: 26 U/L (ref 2–50)
ANION GAP SERPL CALC-SCNC: 17 MMOL/L (ref 7–16)
AST SERPL-CCNC: 17 U/L (ref 12–45)
BILIRUB SERPL-MCNC: 0.4 MG/DL (ref 0.1–1.5)
BUN SERPL-MCNC: 10 MG/DL (ref 8–22)
CALCIUM ALBUM COR SERPL-MCNC: 8.7 MG/DL (ref 8.5–10.5)
CALCIUM SERPL-MCNC: 9.1 MG/DL (ref 8.5–10.5)
CHLORIDE SERPL-SCNC: 103 MMOL/L (ref 96–112)
CO2 SERPL-SCNC: 21 MMOL/L (ref 20–33)
CREAT SERPL-MCNC: 0.79 MG/DL (ref 0.5–1.4)
GFR SERPLBLD CREATININE-BSD FMLA CKD-EPI: 104 ML/MIN/1.73 M 2
GLOBULIN SER CALC-MCNC: 2.8 G/DL (ref 1.9–3.5)
GLUCOSE SERPL-MCNC: 117 MG/DL (ref 65–99)
POTASSIUM SERPL-SCNC: 4.3 MMOL/L (ref 3.6–5.5)
PROT SERPL-MCNC: 7.3 G/DL (ref 6–8.2)
SODIUM SERPL-SCNC: 141 MMOL/L (ref 135–145)
TSH SERPL DL<=0.005 MIU/L-ACNC: 0.86 UIU/ML (ref 0.38–5.33)

## 2024-01-24 PROCEDURE — 82306 VITAMIN D 25 HYDROXY: CPT

## 2024-01-24 PROCEDURE — 36415 COLL VENOUS BLD VENIPUNCTURE: CPT | Performed by: PHYSICIAN ASSISTANT

## 2024-01-24 PROCEDURE — 80053 COMPREHEN METABOLIC PANEL: CPT

## 2024-01-24 PROCEDURE — 84443 ASSAY THYROID STIM HORMONE: CPT

## 2024-01-24 NOTE — PROGRESS NOTES
Dayton Hassan is a 53 y.o. male here for a non-provider visit for a lab draw on 1/24/2024 at 11:44 AM.    Procedure performed:  Venipuncture     Anatomical site:  Left Antecubital Area    Equipment used:  23 g butterfly     Labs drawn:  Comp Metabolic Panel , Lipid Profile, Vitamin D , TSH, and HIV< HEPC< PROSTATE SPECIFIC AG     Ordering provider:  SRIDHAR MARS PA-C    Lab draw completed by:  Yudith Larson

## 2024-02-15 ENCOUNTER — OFFICE VISIT (OUTPATIENT)
Dept: MEDICAL GROUP | Facility: CLINIC | Age: 54
End: 2024-02-15
Payer: COMMERCIAL

## 2024-02-15 VITALS
SYSTOLIC BLOOD PRESSURE: 122 MMHG | RESPIRATION RATE: 20 BRPM | HEIGHT: 73 IN | TEMPERATURE: 98.5 F | OXYGEN SATURATION: 95 % | HEART RATE: 94 BPM | WEIGHT: 268.96 LBS | BODY MASS INDEX: 35.65 KG/M2 | DIASTOLIC BLOOD PRESSURE: 64 MMHG

## 2024-02-15 DIAGNOSIS — E55.9 VITAMIN D DEFICIENCY: ICD-10-CM

## 2024-02-15 DIAGNOSIS — R73.9 HYPERGLYCEMIA: ICD-10-CM

## 2024-02-15 DIAGNOSIS — J40 BRONCHITIS: ICD-10-CM

## 2024-02-15 DIAGNOSIS — J44.9 CHRONIC OBSTRUCTIVE PULMONARY DISEASE, UNSPECIFIED COPD TYPE (HCC): ICD-10-CM

## 2024-02-15 DIAGNOSIS — J45.41 MODERATE PERSISTENT ASTHMA WITH ACUTE EXACERBATION: ICD-10-CM

## 2024-02-15 PROCEDURE — 3078F DIAST BP <80 MM HG: CPT | Performed by: PHYSICIAN ASSISTANT

## 2024-02-15 PROCEDURE — 99214 OFFICE O/P EST MOD 30 MIN: CPT | Performed by: PHYSICIAN ASSISTANT

## 2024-02-15 PROCEDURE — 3074F SYST BP LT 130 MM HG: CPT | Performed by: PHYSICIAN ASSISTANT

## 2024-02-15 RX ORDER — DOXYCYCLINE HYCLATE 100 MG
100 TABLET ORAL 2 TIMES DAILY
Qty: 20 TABLET | Refills: 0 | Status: SHIPPED | OUTPATIENT
Start: 2024-02-15

## 2024-02-15 RX ORDER — FLUTICASONE FUROATE, UMECLIDINIUM BROMIDE AND VILANTEROL TRIFENATATE 200; 62.5; 25 UG/1; UG/1; UG/1
1 POWDER RESPIRATORY (INHALATION) DAILY
Qty: 90 EACH | Refills: 2 | Status: SHIPPED | OUTPATIENT
Start: 2024-02-15

## 2024-02-15 RX ORDER — CHOLECALCIFEROL (VITAMIN D3) 125 MCG
1 CAPSULE ORAL DAILY
Qty: 90 CAPSULE | Refills: 2 | Status: SHIPPED | OUTPATIENT
Start: 2024-02-15

## 2024-02-16 NOTE — PROGRESS NOTES
cc: COPD    Subjective:     Dayton Hassan is a 53 y.o. male presenting for COPD    Patient presents to the office for COPD.  Patient presented as a new patient to me on 1- with COPD and severe asthma.  He was needing help with breathing.  At the time he was given albuterol and prednisone.  We also switched his Symbicort to Trelegy and obtained labs.  He is here today to follow-up with his most recent test results and with his COPD and asthma.  He states that being sick it was difficult to know if the steroid helped but feels the trelegy helped.      Review of systems:  See above.   Denies any symptoms unless previously indicated.        Current Outpatient Medications:     fluticasone-umeclidinium-vilanterol (TRELEGY ELLIPTA) 200-62.5-25 mcg/act inhaler, Inhale 1 Puff every day., Disp: 90 Each, Rfl: 2    doxycycline (VIBRAMYCIN) 100 MG Tab, Take 1 Tablet by mouth 2 times a day., Disp: 20 Tablet, Rfl: 0    Cholecalciferol (VITAMIN D) 125 MCG (5000 UT) Cap, Take 1 Capsule by mouth every day., Disp: 90 Capsule, Rfl: 2    albuterol (PROVENTIL) 2.5mg/3ml Nebu Soln solution for nebulization, Take 3 mL by nebulization every four hours as needed for Shortness of Breath., Disp: 120 Each, Rfl: 2    albuterol 108 (90 Base) MCG/ACT Aero Soln inhalation aerosol, 2 PUFFS EVERY 4 HOURS ONLY IF NEEDED FOR COUGH, WHEEZING, OR SHORTNESS OF BREATH., Disp: 54 g, Rfl: 1    ibuprofen (MOTRIN) 800 MG Tab, 1 TAB BY MOUTH EVERY 8 HOURS ONLY IF NEEDED FOR HEADACHE OR PAIN. TAKE WITH FOOD., Disp: 60 Tablet, Rfl: 0    moxifloxacin (VIGAMOX) 0.5 % Solution, Administer 1 Drop into the right eye 4 times a day. (Patient not taking: Reported on 2/15/2024), Disp: , Rfl:     EPINEPHrine (EPIPEN) 0.3 MG/0.3ML Solution Auto-injector solution for injection, Inject 0.3 mL into the thigh one time for 1 dose (Patient taking differently: Inject 0.3 mL into the thigh one time for 1 dose Pt has not picked up due to cost), Disp: 1 Each, Rfl:  "5    Allergies, past medical history, past surgical history, family history, social history reviewed and updated    Objective:     Vitals: /64 (BP Location: Left arm, Patient Position: Sitting, BP Cuff Size: Adult)   Pulse 94   Temp 36.9 °C (98.5 °F) (Temporal)   Resp 20   Ht 1.842 m (6' 0.5\")   Wt 122 kg (268 lb 15.4 oz)   SpO2 95%   BMI 35.98 kg/m²   General: Alert, pleasant, NAD  EYES:   PERRL, EOMI, no icterus or pallor.  Conjunctivae and lids normal.   HENT:  Normocephalic.  External ears normal. Neck supple.   Heart: Regular rate and rhythm.  S1 and S2 normal.  No murmurs appreciated.  Respiratory:  wheezing.  Decreased to absent breath sounds all lung fields.    Abdomen: obese  Skin: Warm, dry, no rashes.  Musculoskeletal: Gait is normal.  Moves all extremities well.    Extremities: normal range of motion all extremities.   Neurological: No tremors, sensation grossly intact, CN2-12 intact.  Psych:  Affect/mood is normal, judgement is good, memory is intact, grooming is appropriate.     Latest Reference Range & Units 01/24/24 07:20   Sodium 135 - 145 mmol/L 141   Potassium 3.6 - 5.5 mmol/L 4.3   Chloride 96 - 112 mmol/L 103   Co2 20 - 33 mmol/L 21   Anion Gap 7.0 - 16.0  17.0 (H)   Glucose 65 - 99 mg/dL 117 (H)   Bun 8 - 22 mg/dL 10   Creatinine 0.50 - 1.40 mg/dL 0.79   GFR (CKD-EPI) >60 mL/min/1.73 m 2 104   Calcium 8.5 - 10.5 mg/dL 9.1   Correct Calcium 8.5 - 10.5 mg/dL 8.7   AST(SGOT) 12 - 45 U/L 17   ALT(SGPT) 2 - 50 U/L 26   Alkaline Phosphatase 30 - 99 U/L 86   Total Bilirubin 0.1 - 1.5 mg/dL 0.4   Albumin 3.2 - 4.9 g/dL 4.5   Total Protein 6.0 - 8.2 g/dL 7.3   Globulin 1.9 - 3.5 g/dL 2.8   A-G Ratio g/dL 1.6   25-Hydroxy   Vitamin D 25 30 - 100 ng/mL 20 (L)   TSH 0.380 - 5.330 uIU/mL 0.860   (H): Data is abnormally high  (L): Data is abnormally low    Assessment/Plan:     Dayton was seen today for lab results and medication follow-up.    Diagnoses and all orders for this visit:    Chronic " obstructive pulmonary disease, unspecified COPD type (HCC)  -     fluticasone-umeclidinium-vilanterol (TRELEGY ELLIPTA) 200-62.5-25 mcg/act inhaler; Inhale 1 Puff every day.  Moderate persistent asthma with acute exacerbation  -     fluticasone-umeclidinium-vilanterol (TRELEGY ELLIPTA) 200-62.5-25 mcg/act inhaler; Inhale 1 Puff every day.  Bronchitis  -     fluticasone-umeclidinium-vilanterol (TRELEGY ELLIPTA) 200-62.5-25 mcg/act inhaler; Inhale 1 Puff every day.  -     doxycycline (VIBRAMYCIN) 100 MG Tab; Take 1 Tablet by mouth 2 times a day.    Symptoms are improved for patient but not controlled.  In the meantime he has developed bronchitis.  Will increase the dose of the Trelegy.  He has a steroid at home that he will take and we will add doxycycline.  Follow-up in 2 months, sooner if needed.    Vitamin D deficiency  -     Cholecalciferol (VITAMIN D) 125 MCG (5000 UT) Cap; Take 1 Capsule by mouth every day.    Will start patient on a vitamin D supplement.  Will need to recheck labs in approximately 6 months.    Hyperglycemia    Possibly related to steroid use.  We can recheck labs in 6 months.        No follow-ups on file.    Please note that this dictation was created using voice recognition software. I have made every reasonable attempt to correct obvious errors, but expect that there are errors of grammar and possible content that I did not discover before finalizing note.

## 2024-04-29 DIAGNOSIS — J44.1 COPD WITH ACUTE EXACERBATION (HCC): ICD-10-CM

## 2024-04-30 ENCOUNTER — APPOINTMENT (OUTPATIENT)
Dept: RADIOLOGY | Facility: MEDICAL CENTER | Age: 54
End: 2024-04-30
Attending: EMERGENCY MEDICINE
Payer: COMMERCIAL

## 2024-04-30 ENCOUNTER — HOSPITAL ENCOUNTER (EMERGENCY)
Facility: MEDICAL CENTER | Age: 54
End: 2024-04-30
Attending: EMERGENCY MEDICINE
Payer: COMMERCIAL

## 2024-04-30 ENCOUNTER — OFFICE VISIT (OUTPATIENT)
Dept: URGENT CARE | Facility: PHYSICIAN GROUP | Age: 54
End: 2024-04-30
Payer: COMMERCIAL

## 2024-04-30 VITALS
DIASTOLIC BLOOD PRESSURE: 79 MMHG | OXYGEN SATURATION: 92 % | TEMPERATURE: 98.4 F | RESPIRATION RATE: 18 BRPM | HEART RATE: 86 BPM | BODY MASS INDEX: 35.04 KG/M2 | WEIGHT: 273 LBS | SYSTOLIC BLOOD PRESSURE: 154 MMHG | HEIGHT: 74 IN

## 2024-04-30 VITALS
SYSTOLIC BLOOD PRESSURE: 134 MMHG | HEART RATE: 119 BPM | WEIGHT: 273 LBS | OXYGEN SATURATION: 93 % | BODY MASS INDEX: 36.98 KG/M2 | HEIGHT: 72 IN | RESPIRATION RATE: 20 BRPM | TEMPERATURE: 98.3 F | DIASTOLIC BLOOD PRESSURE: 78 MMHG

## 2024-04-30 DIAGNOSIS — F17.200 CURRENT SMOKER: ICD-10-CM

## 2024-04-30 DIAGNOSIS — R07.89 CHEST WALL PAIN: ICD-10-CM

## 2024-04-30 DIAGNOSIS — I48.91 ATRIAL FIBRILLATION, UNSPECIFIED TYPE (HCC): ICD-10-CM

## 2024-04-30 DIAGNOSIS — J44.9 CHRONIC OBSTRUCTIVE PULMONARY DISEASE, UNSPECIFIED COPD TYPE (HCC): ICD-10-CM

## 2024-04-30 LAB
ALBUMIN SERPL BCP-MCNC: 4.6 G/DL (ref 3.2–4.9)
ALBUMIN/GLOB SERPL: 1.7 G/DL
ALP SERPL-CCNC: 84 U/L (ref 30–99)
ALT SERPL-CCNC: 42 U/L (ref 2–50)
ANION GAP SERPL CALC-SCNC: 13 MMOL/L (ref 7–16)
AST SERPL-CCNC: 24 U/L (ref 12–45)
BASOPHILS # BLD AUTO: 0.5 % (ref 0–1.8)
BASOPHILS # BLD: 0.05 K/UL (ref 0–0.12)
BILIRUB SERPL-MCNC: <0.2 MG/DL (ref 0.1–1.5)
BUN SERPL-MCNC: 15 MG/DL (ref 8–22)
CALCIUM ALBUM COR SERPL-MCNC: 8.5 MG/DL (ref 8.5–10.5)
CALCIUM SERPL-MCNC: 9 MG/DL (ref 8.5–10.5)
CHLORIDE SERPL-SCNC: 102 MMOL/L (ref 96–112)
CO2 SERPL-SCNC: 21 MMOL/L (ref 20–33)
CREAT SERPL-MCNC: 0.71 MG/DL (ref 0.5–1.4)
EKG IMPRESSION: NORMAL
EKG IMPRESSION: NORMAL
EOSINOPHIL # BLD AUTO: 0.19 K/UL (ref 0–0.51)
EOSINOPHIL NFR BLD: 2 % (ref 0–6.9)
ERYTHROCYTE [DISTWIDTH] IN BLOOD BY AUTOMATED COUNT: 42.6 FL (ref 35.9–50)
GFR SERPLBLD CREATININE-BSD FMLA CKD-EPI: 108 ML/MIN/1.73 M 2
GLOBULIN SER CALC-MCNC: 2.7 G/DL (ref 1.9–3.5)
GLUCOSE SERPL-MCNC: 119 MG/DL (ref 65–99)
HCT VFR BLD AUTO: 48 % (ref 42–52)
HGB BLD-MCNC: 15.9 G/DL (ref 14–18)
IMM GRANULOCYTES # BLD AUTO: 0.09 K/UL (ref 0–0.11)
IMM GRANULOCYTES NFR BLD AUTO: 0.9 % (ref 0–0.9)
LYMPHOCYTES # BLD AUTO: 2.43 K/UL (ref 1–4.8)
LYMPHOCYTES NFR BLD: 25 % (ref 22–41)
MCH RBC QN AUTO: 28.9 PG (ref 27–33)
MCHC RBC AUTO-ENTMCNC: 33.1 G/DL (ref 32.3–36.5)
MCV RBC AUTO: 87.3 FL (ref 81.4–97.8)
MONOCYTES # BLD AUTO: 0.79 K/UL (ref 0–0.85)
MONOCYTES NFR BLD AUTO: 8.1 % (ref 0–13.4)
NEUTROPHILS # BLD AUTO: 6.18 K/UL (ref 1.82–7.42)
NEUTROPHILS NFR BLD: 63.5 % (ref 44–72)
NRBC # BLD AUTO: 0 K/UL
NRBC BLD-RTO: 0 /100 WBC (ref 0–0.2)
PLATELET # BLD AUTO: 242 K/UL (ref 164–446)
PMV BLD AUTO: 10.5 FL (ref 9–12.9)
POTASSIUM SERPL-SCNC: 4.1 MMOL/L (ref 3.6–5.5)
PROT SERPL-MCNC: 7.3 G/DL (ref 6–8.2)
RBC # BLD AUTO: 5.5 M/UL (ref 4.7–6.1)
SODIUM SERPL-SCNC: 136 MMOL/L (ref 135–145)
TROPONIN T SERPL-MCNC: 10 NG/L (ref 6–19)
TROPONIN T SERPL-MCNC: 9 NG/L (ref 6–19)
WBC # BLD AUTO: 9.7 K/UL (ref 4.8–10.8)

## 2024-04-30 RX ORDER — ALBUTEROL SULFATE 90 UG/1
AEROSOL, METERED RESPIRATORY (INHALATION)
Qty: 54 G | Refills: 1 | Status: SHIPPED | OUTPATIENT
Start: 2024-04-30

## 2024-04-30 RX ADMIN — IOHEXOL 50 ML: 350 INJECTION, SOLUTION INTRAVENOUS at 20:17

## 2024-04-30 NOTE — PROGRESS NOTES
Subjective:      Chief Complaint   Patient presents with    Chest Pain     Chest pain and SOB   Left center of chest and left lower chest pain hx Asthma, COPD, broken ribs and puncture lung. Saw dead rat and work today, vomited and chest pain got worse.                       Chest Pain   This is a recurrent problem. Episode onset: 1 week.  The onset quality is undetermined. The problem occurs intermittently. The problem has been unchanged. The pain is present in the substernal region. The pain is mild. The quality of the pain is described as pressure. The pain does not radiate. Associated symptoms include shortness of breath. Pertinent negatives include no claudication, cough, exertional chest pressure, fever, nausea, near-syncope, numbness, syncope or vomiting. The pain is aggravated by nothing. Patient has tried NSAIDs for the symptoms. The treatment provided no relief. There are no known risk factors. Past medical history comments: none.  Pertinent negatives for family medical history include: no CAD. Prior workup: EKG.       Past Medical History:   Diagnosis Date    Asthma     Chronic airway obstruction, not elsewhere classified          Social History     Tobacco Use    Smoking status: Every Day     Current packs/day: 2.00     Average packs/day: 2.0 packs/day for 40.0 years (80.0 ttl pk-yrs)     Types: Cigarettes    Smokeless tobacco: Never    Tobacco comments:     currently 1 ppd, down from 3 ppd for many years   Vaping Use    Vaping Use: Never used   Substance Use Topics    Alcohol use: No     Alcohol/week: 0.0 oz    Drug use: No         Current Outpatient Medications on File Prior to Visit   Medication Sig Dispense Refill    albuterol 108 (90 Base) MCG/ACT Aero Soln inhalation aerosol 2 PUFFS EVERY 4 HOURS ONLY IF NEEDED FOR COUGH, WHEEZING, OR SHORTNESS OF BREATH. 54 g 1    fluticasone-umeclidinium-vilanterol (TRELEGY ELLIPTA) 200-62.5-25 mcg/act inhaler Inhale 1 Puff every day. 90 Each 2    Cholecalciferol  (VITAMIN D) 125 MCG (5000 UT) Cap Take 1 Capsule by mouth every day. 90 Capsule 2    albuterol (PROVENTIL) 2.5mg/3ml Nebu Soln solution for nebulization Take 3 mL by nebulization every four hours as needed for Shortness of Breath. 120 Each 2    ibuprofen (MOTRIN) 800 MG Tab 1 TAB BY MOUTH EVERY 8 HOURS ONLY IF NEEDED FOR HEADACHE OR PAIN. TAKE WITH FOOD. 60 Tablet 0    doxycycline (VIBRAMYCIN) 100 MG Tab Take 1 Tablet by mouth 2 times a day. (Patient not taking: Reported on 4/30/2024) 20 Tablet 0    moxifloxacin (VIGAMOX) 0.5 % Solution Administer 1 Drop into the right eye 4 times a day. (Patient not taking: Reported on 2/15/2024)      EPINEPHrine (EPIPEN) 0.3 MG/0.3ML Solution Auto-injector solution for injection Inject 0.3 mL into the thigh one time for 1 dose (Patient not taking: Reported on 4/30/2024) 1 Each 5     No current facility-administered medications on file prior to visit.        Review of Systems   Constitutional: Negative for fever, chills and malaise/fatigue.   Eyes: Negative for vision changes, d/c.    Respiratory: Negative for cough and sputum production.    Cardiovascular: + for chest pain .    +  palpitations.   Gastrointestinal: Negative for nausea, vomiting, abdominal pain, diarrhea and constipation.   Genitourinary: Negative for dysuria, urgency and frequency.   Skin: Negative for rash or  itching.   Neurological: Negative for dizziness and tingling.   Psychiatric/Behavioral: Negative for depression.   Hematologic/lymphatic - denies bruising or excessive bleeding  All other systems reviewed and are negative.         Objective:    /78   Pulse (!) 119   Temp 36.8 °C (98.3 °F) (Temporal)   Resp 20   Ht 1.829 m (6')   Wt 124 kg (273 lb)   SpO2 93%       Physical Exam   Constitutional: pt is oriented to person, place, and time. Pt appears well-developed and well-nourished.   HENT:   Head: Normocephalic and atraumatic.   Mouth/Throat: Oropharynx is clear and moist.   Eyes: Conjunctivae  and EOM are normal. Pupils are equal, round, and reactive to light. No scleral icterus.   Neck: Normal range of motion. Neck supple. No JVD present. No thyromegaly present.   Cardiovascular: irregular rate rate, regular rhythm, normal heart sounds and intact distal pulses.  Exam reveals no gallop and no friction rub.    No murmur heard.  Pulmonary/Chest: Effort normal and breath sounds normal. No respiratory distress. Pt has no wheezes. Pt has no rales. Pt exhibits no tenderness.   Abdominal: Soft.   Musculoskeletal: Normal range of motion. Pt exhibits no edema.   Lymphadenopathy:     Pt has no cervical adenopathy.   Neurological: pt is alert and oriented to person, place, and time. No cranial nerve deficit.   Skin: Skin is warm and dry. No erythema.   Psychiatric: pt has a normal mood and affect. patient's behavior is normal.          Assessment/Plan:          1. Atrial fibrillation, unspecified type (HCC)    EKG shows a-fib with RVR, ventricular rate 110    Pt is symptomatic and will require higher level of care    Pt refused ambulance transport and will drive himself.     Pt advised of risks and voiced understanding    Report called to transfer line    Pt voiced understanding of POC    All questions were answered.

## 2024-04-30 NOTE — TELEPHONE ENCOUNTER
Requested Prescriptions     Pending Prescriptions Disp Refills    albuterol 108 (90 Base) MCG/ACT Aero Soln inhalation aerosol 54 g 1     Si PUFFS EVERY 4 HOURS ONLY IF NEEDED FOR COUGH, WHEEZING, OR SHORTNESS OF BREATH.       LOV 02/15/24  Labs 24

## 2024-05-01 NOTE — ED NOTES
Pt provided discharge instructions. Pt verbalized understanding of all instructions. IV removed, cathlon intact, site w/out s/s of infection. Pt ambulatory to lobby w/ steady gait.

## 2024-05-01 NOTE — ED NOTES
ERP at bedside.  Bedside report from LAY Ferguson. Pt on gurney in lowest, locked position, on RA, and continuous cardiac monitoring. Fall precautions in place, including fall risk sign. Pt updated on plan of care. No needs at this time. Call light within reach.

## 2024-05-01 NOTE — ED TRIAGE NOTES
"Chief Complaint   Patient presents with    Chest Pain     Seen @  today and sent for further evaluation of new afib dx.   CP and increased SOB x3days      /85   Pulse (!) 108   Temp 36.9 °C (98.5 °F) (Temporal)   Resp 18   Ht 1.88 m (6' 2\")   Wt 124 kg (273 lb)   SpO2 94%   BMI 35.05 kg/m²     Pt is alert/oriented and follows commands. Pt speaking in full sentences and responds appropriately to questions. No acute distress noted in triage and respirations are even and unlabored.      Pt placed in lobby and educated on triage process. Pt encouraged to alert staff for any changes in condition.    "

## 2024-05-01 NOTE — ED PROVIDER NOTES
ED Provider Note    CHIEF COMPLAINT  Chief Complaint   Patient presents with    Chest Pain     Seen @  today and sent for further evaluation of new afib dx.   CP and increased SOB x3days        EXTERNAL RECORDS REVIEWED  Outpatient Notes patient was seen at [facility] emergency department today for increase shortness of breath for last few days and was found to have atrial fibrillation on EKG and transferred here for further evaluation    HPI/ROS  LIMITATION TO HISTORY   Select: : None  OUTSIDE HISTORIAN(S):  iliana    Dayton Hassan is a 54 y.o. male who presents who presents to the emerged part with chief complaint of few days of worsening shortness of breath.  He states he has a history of COPD and he still smoking so he figured that is what it was.  However he has had a little bit of discomfort in the left side of his chest, felt like his ribs were bruised.  He is definitely felt more short of breath with exertion.  He states it hurts when you push on that side the chest no change in sputum no fevers no chills.  He went from the urgent care and he was told that he may be in an abnormal heart rhythm and so we decided to drive himself here rather than except a ambulance.  Patient currently denies red discomfort unless he moves or coughs.  States the pain is definitely worse with cough movement and palpation    PAST MEDICAL HISTORY   has a past medical history of Asthma and Chronic airway obstruction, not elsewhere classified.    SURGICAL HISTORY   has a past surgical history that includes other orthopedic surgery; cataract extraction with iol; and block selective nerve.    FAMILY HISTORY  Family History   Problem Relation Age of Onset    Asthma Mother         asthma and copd    Other Mother         varicose veins    Lung Cancer Father     Diabetes Father     Cancer Father         bone and lung    Hyperlipidemia Father     Diabetes Maternal Grandmother     Heart Disease Maternal Grandfather     Diabetes Maternal  "Grandfather     Diabetes Paternal Grandmother     Heart Disease Paternal Grandfather     Diabetes Paternal Grandfather        SOCIAL HISTORY  Social History     Tobacco Use    Smoking status: Every Day     Current packs/day: 2.00     Average packs/day: 2.0 packs/day for 40.0 years (80.0 ttl pk-yrs)     Types: Cigarettes    Smokeless tobacco: Never    Tobacco comments:     currently 1 ppd, down from 3 ppd for many years   Vaping Use    Vaping Use: Never used   Substance and Sexual Activity    Alcohol use: No     Alcohol/week: 0.0 oz    Drug use: No    Sexual activity: Not on file       CURRENT MEDICATIONS  Home Medications       Reviewed by Jackelyn Rouse R.N. (Registered Nurse) on 04/30/24 at 1728  Med List Status: Not Addressed     Medication Last Dose Status   albuterol (PROVENTIL) 2.5mg/3ml Nebu Soln solution for nebulization  Active   albuterol 108 (90 Base) MCG/ACT Aero Soln inhalation aerosol  Active   Cholecalciferol (VITAMIN D) 125 MCG (5000 UT) Cap  Active   doxycycline (VIBRAMYCIN) 100 MG Tab  Active   EPINEPHrine (EPIPEN) 0.3 MG/0.3ML Solution Auto-injector solution for injection  Active   fluticasone-umeclidinium-vilanterol (TRELEGY ELLIPTA) 200-62.5-25 mcg/act inhaler  Active   ibuprofen (MOTRIN) 800 MG Tab  Active   moxifloxacin (VIGAMOX) 0.5 % Solution  Active                    ALLERGIES  No Known Allergies    PHYSICAL EXAM  VITAL SIGNS: /85   Pulse (!) 108   Temp 36.9 °C (98.5 °F) (Temporal)   Resp 18   Ht 1.88 m (6' 2\")   Wt 124 kg (273 lb)   SpO2 94%   BMI 35.05 kg/m²    Pulse OX: Pulse Oxygen level is normal  Constitutional: Alert in no apparent distress.  HENT: Normocephalic, Atraumatic, MMM  Eyes: PERound. Conjunctiva normal, non-icteric.   Heart: Regular rate and rhythm, intact distal pulses no murmurs rubs or gallops  Lungs: Symmetrical movement, no resp distress clear to auscultation bilaterally with left chest wall discomfort reproducible pain  Abdomen: Non-tender, " non-distended, normal bowel sounds  EXT/Back no edema  Skin: Warm, Dry, No erythema, No rash.   Neurologic: Alert and oriented, Grossly non-focal.       EKG/LABS  Labs Reviewed   COMP METABOLIC PANEL - Abnormal; Notable for the following components:       Result Value    Glucose 119 (*)     All other components within normal limits   CBC WITH DIFFERENTIAL   TROPONIN   TROPONIN   ESTIMATED GFR       Results for orders placed or performed during the hospital encounter of 24   EKG   Result Value Ref Range    Report       [facility] Emergency Dept.    Test Date:  2024  Pt Name:    COLETTE LOYD                   Department: ER  MRN:        [id number]                      Room:  Gender:     Male                         Technician: [id number]  :        1970                   Requested By:ER TRIAGE PROTOCOL  Order #:    [id number]                    Reading MD: Rossana Serrato MD    Measurements  Intervals                                Axis  Rate:       100                          P:          150  NJ:         151                          QRS:        -10  QRSD:       85                           T:          137  QT:         344  QTc:        444    Interpretive Statements  Sinus rhythm at a rate of 100 no ST elevations or ST depressions no abnormal  T wave inversions or Q waves voltage is definitely changed from prior  Compared to ECG 2021 12:06:43  voltage change  Electronically Signed On 2024 19:09:47 PDT by Rossana Serrato MD     EKG   Result Value Ref Range    Report       [facility] Emergency Dept.    Test Date:  2024  Pt Name:    COLETTE LOYD                   Department: ER  MRN:        [id number]                      Room:  Gender:     Male                         Technician: [id number]  :        1970                   Requested By:ER TRIAGE PROTOCOL  Order #:    [id number]                    Reading MD:    Measurements  Intervals                                 Axis  Rate:       77                           P:          43  ME:         141                          QRS:        29  QRSD:       94                           T:          54  QT:         382  QTc:        433    Interpretive Statements  Sinus rhythm  Baseline wander in lead(s) V4  Compared to ECG 04/30/2024 17:05:45  ST (T wave) deviation no longer present  T-wave abnormality no longer present  Q waves no longer present         I have independently interpreted this EKG    RADIOLOGY/PROCEDURES   I have independently interpreted the diagnostic imaging associated with this visit and am waiting the final reading from the radiologist.   My preliminary interpretation is as follows:     Chest x-ray without cardiomegaly  CT pulmonary angiogram no evidence of pulmonary embolus or interstitial process    Radiologist interpretation:  CT-CTA CHEST PULMONARY ARTERY W/ RECONS   Final Result         1.  No pulmonary embolus appreciated.   2.  Changes of hepatic steatosis   3.  Atherosclerosis and atherosclerotic coronary artery disease.      DX-CHEST-PORTABLE (1 VIEW)   Final Result      1.  Mild hyperlucency in the upper lung zones which may represent emphysematous change.   2.  Linear stranding in the left lung base at the vicinity of the costophrenic angle consistent with scarring or subsegmental atelectatic change.          COURSE & MEDICAL DECISION MAKING    ASSESSMENT, COURSE AND PLAN  Care Narrative:     Patient is a 51-year-old male who has a history of COPD and shortness of breath has had continued to smoke but no change in his sputum or fevers or chills.  His pain is little bit reduced below his chest wall.  EKG here in the emerged apartment is not atrial fibrillation, he is not having chest pain or dyspnea on exertion so I doubt ACS but will evaluate with a troponin.  Fortunately his first 1 was unremarkable.  There is a little bit of pleuritic components of pulmonary embolism will be  evaluated.    DISPOSITION AND DISCUSSIONS  8:46 PM  Fortunately the patient had 2 EKGs here in our emergency department emergency not atrial fibrillation his troponin is normal x 2 and there is no evidence of pulmonary embolism.  I was able to obtain the EKG from [facility] and unfortunately he had baseline artifact without actual atrial fibrillation.  At this time there is no cause or concern for A-fib he does not to be started on blood thinners and does not need be hospitalized for echocardiogram.  He will be referred to cardiology but I suspect most likely this is just part of a COPD exacerbation.  And some pleuritic component without evidence of PE or ACS.  He feels comfortable going home will follow-up with primary care.    Patient's heart score is 3      I have discussed management of the patient with the following physicians and PRISCA's:  none    Discussion of management with other [facility] or appropriate source(s): None     Escalation of care considered, and ultimately not performed:acute inpatient care management, however at this time, the patient is most appropriate for outpatient management    Barriers to care at this time, including but not limited to:  na .     Decision tools and prescription drugs considered including, but not limited to:  na .    The patient will return for new or worsening symptoms and is stable at the time of discharge.    The patient is referred to a primary physician for blood pressure management, diabetic screening, and for all other preventative health concerns.    DISPOSITION:  Patient will be discharged home in stable condition.    FOLLOW UP:  [facility], Emergency Dept  [address] [address] [phone]    If symptoms worsen    Ryanne Kennedy P.A.-C.  [address]  [facility] [id number] [phone]    Schedule an appointment as soon as possible for a visit         OUTPATIENT MEDICATIONS:  Discharge Medication List as of  4/30/2024  9:04 PM            FINAL DIAGNOSIS  1. Chest wall pain    2. Chronic obstructive pulmonary disease, unspecified COPD type (HCC)    3. Current smoker           Electronically signed by: Rossana Serrato M.D., 4/30/2024 7:06 PM

## 2024-05-08 ENCOUNTER — TELEPHONE (OUTPATIENT)
Dept: HEALTH INFORMATION MANAGEMENT | Facility: OTHER | Age: 54
End: 2024-05-08

## 2024-05-13 DIAGNOSIS — J44.9 CHRONIC OBSTRUCTIVE PULMONARY DISEASE, UNSPECIFIED COPD TYPE (HCC): ICD-10-CM

## 2024-05-13 DIAGNOSIS — J45.41 MODERATE PERSISTENT ASTHMA WITH ACUTE EXACERBATION: ICD-10-CM

## 2024-05-13 DIAGNOSIS — J40 BRONCHITIS: ICD-10-CM

## 2024-05-13 RX ORDER — FLUTICASONE FUROATE, UMECLIDINIUM BROMIDE AND VILANTEROL TRIFENATATE 200; 62.5; 25 UG/1; UG/1; UG/1
1 POWDER RESPIRATORY (INHALATION) DAILY
Qty: 90 EACH | Refills: 1 | Status: SHIPPED | OUTPATIENT
Start: 2024-05-13

## 2024-05-13 NOTE — TELEPHONE ENCOUNTER
Requested Prescriptions     Pending Prescriptions Disp Refills    fluticasone-umeclidinium-vilanterol (TRELEGY ELLIPTA) 200-62.5-25 mcg/act inhaler 90 Each 0     Sig: Inhale 1 Puff every day.     LOV 02/15/2024  Labs 01/24/2024

## 2024-06-18 DIAGNOSIS — J40 BRONCHITIS: ICD-10-CM

## 2024-06-18 DIAGNOSIS — J45.41 MODERATE PERSISTENT ASTHMA WITH ACUTE EXACERBATION: ICD-10-CM

## 2024-06-18 DIAGNOSIS — J44.9 CHRONIC OBSTRUCTIVE PULMONARY DISEASE, UNSPECIFIED COPD TYPE (HCC): ICD-10-CM

## 2024-06-18 RX ORDER — FLUTICASONE FUROATE, UMECLIDINIUM BROMIDE AND VILANTEROL TRIFENATATE 200; 62.5; 25 UG/1; UG/1; UG/1
1 POWDER RESPIRATORY (INHALATION)
Qty: 60 EACH | Refills: 2 | Status: SHIPPED | OUTPATIENT
Start: 2024-06-18 | End: 2024-06-19 | Stop reason: SDUPTHER

## 2024-06-18 NOTE — TELEPHONE ENCOUNTER
Received request via: Patient    Was the patient seen in the last year in this department? Yes    Does the patient have an active prescription (recently filled or refills available) for medication(s) requested?  Yes    Pharmacy Name: [facility] in [place]    Does the patient have longterm Plus and need 100 day supply (blood pressure, diabetes and cholesterol meds only)? Patient does not have SCP

## 2024-06-19 ENCOUNTER — OFFICE VISIT (OUTPATIENT)
Dept: MEDICAL GROUP | Facility: CLINIC | Age: 54
End: 2024-06-19
Payer: COMMERCIAL

## 2024-06-19 VITALS
TEMPERATURE: 98.3 F | HEART RATE: 101 BPM | RESPIRATION RATE: 18 BRPM | OXYGEN SATURATION: 96 % | WEIGHT: 261.91 LBS | DIASTOLIC BLOOD PRESSURE: 68 MMHG | BODY MASS INDEX: 33.61 KG/M2 | SYSTOLIC BLOOD PRESSURE: 136 MMHG | HEIGHT: 74 IN

## 2024-06-19 DIAGNOSIS — F17.200 CURRENT SMOKER: ICD-10-CM

## 2024-06-19 DIAGNOSIS — J45.41 MODERATE PERSISTENT ASTHMA WITH ACUTE EXACERBATION: ICD-10-CM

## 2024-06-19 DIAGNOSIS — J40 BRONCHITIS: ICD-10-CM

## 2024-06-19 DIAGNOSIS — E55.9 VITAMIN D DEFICIENCY: ICD-10-CM

## 2024-06-19 DIAGNOSIS — R63.5 WEIGHT GAIN: ICD-10-CM

## 2024-06-19 DIAGNOSIS — J44.9 CHRONIC OBSTRUCTIVE PULMONARY DISEASE, UNSPECIFIED COPD TYPE (HCC): ICD-10-CM

## 2024-06-19 DIAGNOSIS — J44.1 COPD WITH ACUTE EXACERBATION (HCC): ICD-10-CM

## 2024-06-19 PROCEDURE — 3078F DIAST BP <80 MM HG: CPT | Performed by: PHYSICIAN ASSISTANT

## 2024-06-19 PROCEDURE — 99214 OFFICE O/P EST MOD 30 MIN: CPT | Performed by: PHYSICIAN ASSISTANT

## 2024-06-19 PROCEDURE — 3075F SYST BP GE 130 - 139MM HG: CPT | Performed by: PHYSICIAN ASSISTANT

## 2024-06-19 RX ORDER — VARENICLINE TARTRATE 0.5 (11)-1
KIT ORAL
Qty: 1 EACH | Refills: 1 | Status: SHIPPED | OUTPATIENT
Start: 2024-06-19

## 2024-06-19 RX ORDER — ALBUTEROL SULFATE 90 UG/1
AEROSOL, METERED RESPIRATORY (INHALATION)
Qty: 54 G | Refills: 3 | Status: SHIPPED | OUTPATIENT
Start: 2024-06-19

## 2024-06-19 RX ORDER — FLUTICASONE FUROATE, UMECLIDINIUM BROMIDE AND VILANTEROL TRIFENATATE 200; 62.5; 25 UG/1; UG/1; UG/1
1 POWDER RESPIRATORY (INHALATION)
Qty: 60 EACH | Refills: 6 | Status: SHIPPED | OUTPATIENT
Start: 2024-06-19

## 2024-06-19 RX ORDER — PREDNISONE 20 MG/1
20 TABLET ORAL DAILY
Qty: 5 TABLET | Refills: 0 | Status: SHIPPED | OUTPATIENT
Start: 2024-06-19 | End: 2024-06-24

## 2024-06-19 RX ORDER — IBUPROFEN 800 MG/1
TABLET ORAL
Qty: 60 TABLET | Refills: 5 | Status: SHIPPED | OUTPATIENT
Start: 2024-06-19

## 2024-06-19 RX ORDER — IPRATROPIUM BROMIDE AND ALBUTEROL SULFATE 2.5; .5 MG/3ML; MG/3ML
3 SOLUTION RESPIRATORY (INHALATION) 4 TIMES DAILY
Qty: 300 ML | Refills: 3 | Status: SHIPPED | OUTPATIENT
Start: 2024-06-19

## 2024-06-19 RX ORDER — CHOLECALCIFEROL (VITAMIN D3) 125 MCG
1 CAPSULE ORAL DAILY
Qty: 90 CAPSULE | Refills: 2 | Status: SHIPPED | OUTPATIENT
Start: 2024-06-19

## 2024-06-19 RX ORDER — VARENICLINE TARTRATE 1 MG/1
1 TABLET, FILM COATED ORAL 2 TIMES DAILY
Qty: 60 TABLET | Refills: 3 | Status: SHIPPED | OUTPATIENT
Start: 2024-06-19

## 2024-06-19 ASSESSMENT — FIBROSIS 4 INDEX: FIB4 SCORE: 0.86

## 2024-06-19 NOTE — PROGRESS NOTES
cc:  ER follow up    Subjective:     Dayton Hassan is a 54 y.o. male presenting for ER follow up        History of Present Illness  The patient is a 54-year-old male who presents for ER follow-up from 04/30/2024. He presented to the ER for shortness of breath and chest pain. He had 2 EKGs in the ER. One and firmly EKG from [facility] was believed to have baseline artifact without atrial fibrillation. EKGs in the emergency room did not show atrial fibrillation. The patient was diagnosed with a COPD exacerbation.    The patient sought medical attention at [place] due to suspected cardiac issues, including arm and chest discomfort. An EKG was performed, revealing atrial fibrillation, however, ER physician indicated the EKG was not read appropriately by the [place] provider causing significant stress during his hospital visit. He is currently on a regimen of Trelegy, an inhaler, and a nebulizer, but has not received any steroids. His condition has improved, with Mucinex providing some relief for his mucus, but he is seeking a stronger medication. He has two nebulizers at home and a portable one in his car. He reports a buildup of mucus at night, which he manages with a double dose of Trelegy before sleep to mitigate chest gurgling. He recalls receiving DuoNeb in the past, but later switched to albuterol. He has previously tried Chantix, which he tolerated well, but experienced unusual dreams. He denies any suicidal ideation. He acknowledges the need to quit smoking, which he believes will significantly improve his breathing. He began smoking cigarettes at the age of 63 and has attempted to quit abruptly. He abstained from smoking for an entire day, but resumed smoking the following day. He has been self-medicating with NyQuil at night to manage his cough, which he believes is becoming accustomed to. He also reports expectorating thick phlegm. He has experienced episodes of throat closure and difficulty breathing, which  were alleviated by freezing his head. He reports that prolonged sitting appears to alleviate his breathing difficulties. He has only taken Trelegy today and has not used his nebulizer today. He recalls having a Z-Paco at home approximately a month ago, which he found beneficial.    The patient expresses a desire to lose weight, which he believes is contributing to his back and breathing difficulties. He is not interested in injectables due to personal preference for needles and meals. He reports frequent bathroom visits postprandially. He has experimented with Atkins diet, but feels it is currently ineffective. He believes that weight loss will alleviate his back and improve his breathing.    Patient also has vitamin D deficiency and is needing a refill of his medication.       Review of systems:  See above.   Denies any symptoms unless previously indicated.        Current Outpatient Medications:     ibuprofen (MOTRIN) 800 MG Tab, TAKE 1 TABLET BY MOUTH EVERY 8 HOURS WITH FOOD AS NEEDED FOR HEADACHE OR PAIN, Disp: 60 Tablet, Rfl: 5    orlistat (BETITO) 60 MG capsule, Take 1 Capsule by mouth 3 times a day with meals., Disp: 90 Capsule, Rfl: 3    ipratropium-albuterol (DUONEB) 0.5-2.5 (3) MG/3ML nebulizer solution, Take 3 mL by nebulization 4 times a day., Disp: 300 mL, Rfl: 3    Varenicline Tartrate, Starter, (CHANTIX STARTING MONTH PACO) 0.5 MG X 11 & 1 MG X 42 Tablet Therapy Pack, Take as directed, Disp: 1 Each, Rfl: 1    varenicline (CHANTIX CONTINUING MONTH PACO) 1 MG tablet, Take 1 Tablet by mouth 2 times a day., Disp: 60 Tablet, Rfl: 3    fluticasone-umeclidinium-vilanterol (TRELEGY ELLIPTA) 200-62.5-25 mcg/act inhaler, Inhale 1 Puff every day., Disp: 60 Each, Rfl: 6    albuterol 108 (90 Base) MCG/ACT Aero Soln inhalation aerosol, 2 PUFFS EVERY 4 HOURS ONLY IF NEEDED FOR COUGH, WHEEZING, OR SHORTNESS OF BREATH., Disp: 54 g, Rfl: 3    Cholecalciferol (VITAMIN D) 125 MCG (5000 UT) Cap, Take 1 Capsule by mouth every  "day., Disp: 90 Capsule, Rfl: 2    predniSONE (DELTASONE) 20 MG Tab, Take 1 Tablet by mouth every day for 5 days., Disp: 5 Tablet, Rfl: 0    albuterol (PROVENTIL) 2.5mg/3ml Nebu Soln solution for nebulization, Take 3 mL by nebulization every four hours as needed for Shortness of Breath., Disp: 120 Each, Rfl: 2    doxycycline (VIBRAMYCIN) 100 MG Tab, Take 1 Tablet by mouth 2 times a day. (Patient not taking: Reported on 4/30/2024), Disp: 20 Tablet, Rfl: 0    moxifloxacin (VIGAMOX) 0.5 % Solution, Administer 1 Drop into the right eye 4 times a day. (Patient not taking: Reported on 2/15/2024), Disp: , Rfl:     EPINEPHrine (EPIPEN) 0.3 MG/0.3ML Solution Auto-injector solution for injection, Inject 0.3 mL into the thigh one time for 1 dose (Patient not taking: Reported on 4/30/2024), Disp: 1 Each, Rfl: 5    Allergies, past medical history, past surgical history, family history, social history reviewed and updated    Objective:     Vitals: /68 (BP Location: Left arm, Patient Position: Sitting, BP Cuff Size: Large adult)   Pulse (!) 101   Temp 36.8 °C (98.3 °F) (Temporal)   Resp 18   Ht 1.88 m (6' 2\")   Wt 119 kg (261 lb 14.5 oz)   SpO2 96%   BMI 33.63 kg/m²   General: Alert, pleasant, NAD  EYES:   PERRL, EOMI, no icterus or pallor.  Conjunctivae and lids normal.   HENT:  Normocephalic.  External ears normal.  Neck supple.    Heart: Regular rate and rhythm.  S1 and S2 normal.  No murmurs appreciated.  Respiratory: Normal respiratory effort.  Clear to auscultation bilaterally.decreased breath sounds all lung fields  Abdomen: obese  Skin: Warm, dry, no rashes.  Musculoskeletal: Gait is normal.  Moves all extremities well.    Extremities: normal range of motion all extremities.   Neurological: No tremors, sensation grossly intact,  CN2-12 intact.  Psych:  Affect/mood is normal, judgement is good, memory is intact, grooming is appropriate.      Results  Testing  EKG from [facility] was believed to have baseline " artifact without atrial fibrillation.       Assessment/Plan:     Dayton was seen today for follow-up.    Diagnoses and all orders for this visit:    COPD with acute exacerbation (HCC)  -     albuterol 108 (90 Base) MCG/ACT Aero Soln inhalation aerosol; 2 PUFFS EVERY 4 HOURS ONLY IF NEEDED FOR COUGH, WHEEZING, OR SHORTNESS OF BREATH.    Moderate persistent asthma with acute exacerbation  -     fluticasone-umeclidinium-vilanterol (TRELEGY ELLIPTA) 200-62.5-25 mcg/act inhaler; Inhale 1 Puff every day.  -     predniSONE (DELTASONE) 20 MG Tab; Take 1 Tablet by mouth every day for 5 days.    Bronchitis  -     fluticasone-umeclidinium-vilanterol (TRELEGY ELLIPTA) 200-62.5-25 mcg/act inhaler; Inhale 1 Puff every day.    Chronic obstructive pulmonary disease, unspecified COPD type (HCC)  -     ipratropium-albuterol (DUONEB) 0.5-2.5 (3) MG/3ML nebulizer solution; Take 3 mL by nebulization 4 times a day.  -     fluticasone-umeclidinium-vilanterol (TRELEGY ELLIPTA) 200-62.5-25 mcg/act inhaler; Inhale 1 Puff every day.  -     predniSONE (DELTASONE) 20 MG Tab; Take 1 Tablet by mouth every day for 5 days.    Current smoker  -     Varenicline Tartrate, Starter, (CHANTIX STARTING MONTH PACO) 0.5 MG X 11 & 1 MG X 42 Tablet Therapy Pack; Take as directed  -     varenicline (CHANTIX CONTINUING MONTH PACO) 1 MG tablet; Take 1 Tablet by mouth 2 times a day.    Weight gain  -     orlistat (BETITO) 60 MG capsule; Take 1 Capsule by mouth 3 times a day with meals.    Vitamin D deficiency  -     Cholecalciferol (VITAMIN D) 125 MCG (5000 UT) Cap; Take 1 Capsule by mouth every day.      As of note, patient began to experience chest pain at the end of the visit when he was discussing stressful events I offered an EKG and further evaluation when she declined he has had a workup in the emergency room as of 4- which was negative  Assessment & Plan  1. Current smoker.  The patient will be initiated on Chantix therapy. The patient has been informed  of the potential side effects.    2. Chronic obstructive pulmonary disease (COPD) with moderate persistent asthma, bronchitis.  The patient's medications, including Trelegy and albuterol inhaler, will be refilled. He has sufficient supply of the nebulizer at home. He has been without DuoNeb for several years. At present, DuoNeb will be refilled, and it is anticipated that this will be of significant benefit, particularly for nighttime use. Additionally, prednisone 20 mg daily for 5 days will be initiated.    3. Weight gain.  The patient will be started on orlistat. The potential side effects of the medication have been discussed.    4. Vitamin D deficiency.  The patient's vitamin D prescription has been refilled.    Follow-up  The patient is scheduled for a follow-up visit in 3 weeks.    Return in about 4 weeks (around 7/17/2024), or if symptoms worsen or fail to improve, for 3- 4  weeks follow up copd.    Please note that this dictation was created using voice recognition software. I have made every reasonable attempt to correct obvious errors, but expect that there are errors of grammar and possible content that I did not discover before finalizing note.

## 2024-06-19 NOTE — LETTER
June 19, 2024       Patient: Dayton Hassan   YOB: 1970   Date of Visit: 6/19/2024         To Whom It May Concern:    In my medical opinion, I recommend that Dayton Hassan return to work 6-20-24.  He was seen in office 6-19-24.    If you have any questions or concerns, please don't hesitate to call [phone]          Sincerely,          Ryanne Kennedy P.A.-C.  Electronically Signed

## 2024-07-17 ENCOUNTER — OFFICE VISIT (OUTPATIENT)
Dept: MEDICAL GROUP | Facility: CLINIC | Age: 54
End: 2024-07-17
Payer: COMMERCIAL

## 2024-07-17 VITALS
HEIGHT: 74 IN | RESPIRATION RATE: 18 BRPM | DIASTOLIC BLOOD PRESSURE: 68 MMHG | WEIGHT: 266.76 LBS | BODY MASS INDEX: 34.23 KG/M2 | HEART RATE: 117 BPM | OXYGEN SATURATION: 95 % | TEMPERATURE: 97.2 F | SYSTOLIC BLOOD PRESSURE: 138 MMHG

## 2024-07-17 DIAGNOSIS — E66.9 OBESITY (BMI 30-39.9): ICD-10-CM

## 2024-07-17 DIAGNOSIS — R06.02 SHORTNESS OF BREATH: ICD-10-CM

## 2024-07-17 DIAGNOSIS — R35.0 URINARY FREQUENCY: ICD-10-CM

## 2024-07-17 DIAGNOSIS — J44.9 CHRONIC OBSTRUCTIVE PULMONARY DISEASE, UNSPECIFIED COPD TYPE (HCC): ICD-10-CM

## 2024-07-17 PROCEDURE — 3078F DIAST BP <80 MM HG: CPT | Performed by: PHYSICIAN ASSISTANT

## 2024-07-17 PROCEDURE — 99214 OFFICE O/P EST MOD 30 MIN: CPT | Performed by: PHYSICIAN ASSISTANT

## 2024-07-17 PROCEDURE — 3075F SYST BP GE 130 - 139MM HG: CPT | Performed by: PHYSICIAN ASSISTANT

## 2024-07-17 ASSESSMENT — FIBROSIS 4 INDEX: FIB4 SCORE: 0.86

## 2024-08-14 ENCOUNTER — OFFICE VISIT (OUTPATIENT)
Dept: MEDICAL GROUP | Facility: CLINIC | Age: 54
End: 2024-08-14
Payer: COMMERCIAL

## 2024-08-14 VITALS
RESPIRATION RATE: 18 BRPM | DIASTOLIC BLOOD PRESSURE: 64 MMHG | OXYGEN SATURATION: 95 % | TEMPERATURE: 98.3 F | HEIGHT: 74 IN | BODY MASS INDEX: 34.52 KG/M2 | WEIGHT: 268.96 LBS | SYSTOLIC BLOOD PRESSURE: 132 MMHG | HEART RATE: 117 BPM

## 2024-08-14 DIAGNOSIS — R35.0 URINARY FREQUENCY: ICD-10-CM

## 2024-08-14 DIAGNOSIS — F17.200 CURRENT SMOKER: ICD-10-CM

## 2024-08-14 DIAGNOSIS — J44.9 CHRONIC OBSTRUCTIVE PULMONARY DISEASE, UNSPECIFIED COPD TYPE (HCC): ICD-10-CM

## 2024-08-14 DIAGNOSIS — J45.41 MODERATE PERSISTENT ASTHMA WITH ACUTE EXACERBATION: ICD-10-CM

## 2024-08-14 DIAGNOSIS — E66.9 OBESITY (BMI 30-39.9): ICD-10-CM

## 2024-08-14 DIAGNOSIS — R63.5 WEIGHT GAIN: ICD-10-CM

## 2024-08-14 PROBLEM — J40 BRONCHITIS: Status: RESOLVED | Noted: 2024-02-15 | Resolved: 2024-08-14

## 2024-08-14 PROCEDURE — 3075F SYST BP GE 130 - 139MM HG: CPT | Performed by: PHYSICIAN ASSISTANT

## 2024-08-14 PROCEDURE — 3078F DIAST BP <80 MM HG: CPT | Performed by: PHYSICIAN ASSISTANT

## 2024-08-14 PROCEDURE — 99214 OFFICE O/P EST MOD 30 MIN: CPT | Performed by: PHYSICIAN ASSISTANT

## 2024-08-14 RX ORDER — VARENICLINE TARTRATE 1 MG/1
1 TABLET, FILM COATED ORAL 2 TIMES DAILY
Qty: 60 TABLET | Refills: 0 | Status: SHIPPED | OUTPATIENT
Start: 2024-08-14

## 2024-08-14 RX ORDER — FLUTICASONE FUROATE, UMECLIDINIUM BROMIDE AND VILANTEROL TRIFENATATE 200; 62.5; 25 UG/1; UG/1; UG/1
1 POWDER RESPIRATORY (INHALATION) DAILY
Qty: 3 EACH | Refills: 2 | Status: SHIPPED | OUTPATIENT
Start: 2024-08-14

## 2024-08-14 ASSESSMENT — FIBROSIS 4 INDEX: FIB4 SCORE: 0.86

## 2024-08-14 NOTE — PROGRESS NOTES
cc:  copd    Subjective:     Dayton Hassan is a 54 y.o. male presenting for copd        History of Present Illness  The patient is here today for a follow-up visit concerning his Chronic Obstructive Pulmonary Disease (COPD).    He was previously seen on 07/17/2024, at which time he was prescribed Breztri for his COPD. A follow-up visit was scheduled for 4 weeks later to assess his response to the medication. An echocardiogram was also ordered to further investigate his shortness of breath.     He reports that the Breztri does not seem to be as effective as the Trelegy he was previously taking. He has been using it twice daily but feels that the Trelegy 200 was more beneficial. He requests a switch back to Trelegy. He has yet to schedule an appointment with a pulmonologist for his chest pain and breathing difficulties.    He has been making efforts to quit smoking and has not smoked since the previous Friday. He hopes to completely quit before seeing the lung specialist. He has been using Chantix to aid in smoking cessation. He has noticed a change in the sound of his lungs since he stopped smoking a week ago. His coughing has decreased, although he has had a lifelong history of coughing. He recalls instances where he coughed so severely that he vomited. He is curious if his headaches will resolve once he quits smoking, as his aunt experienced a similar improvement after she quit.    He admits to accidentally taking four pills of Chantix daily instead of the prescribed two pills for a month. He has since corrected this mistake and feels better on the medication. He believes he may need more Chantix to continue his smoking cessation efforts. He reports no kidney pain.    He has gained weight since his last visit and is concerned about this. He is interested in exploring other weight loss medications as he was informed that Dyazide is now available over-the-counter.    He takes ibuprofen 1250 mg for his headaches.  He is scheduled to undergo a procedure on his neck in 10/2024.       Review of systems:  See above.   Denies any symptoms unless previously indicated.        Current Outpatient Medications:     fluticasone-umeclidinium-vilanterol (TRELEGY ELLIPTA) 200-62.5-25 mcg/act inhaler, Inhale 1 Puff every day., Disp: 3 Each, Rfl: 2    varenicline (CHANTIX CONTINUING MONTH PACO) 1 MG tablet, Take 1 Tablet by mouth 2 times a day., Disp: 60 Tablet, Rfl: 0    ibuprofen (MOTRIN) 800 MG Tab, TAKE 1 TABLET BY MOUTH EVERY 8 HOURS WITH FOOD AS NEEDED FOR HEADACHE OR PAIN, Disp: 60 Tablet, Rfl: 5    ipratropium-albuterol (DUONEB) 0.5-2.5 (3) MG/3ML nebulizer solution, Take 3 mL by nebulization 4 times a day., Disp: 300 mL, Rfl: 3    albuterol 108 (90 Base) MCG/ACT Aero Soln inhalation aerosol, 2 PUFFS EVERY 4 HOURS ONLY IF NEEDED FOR COUGH, WHEEZING, OR SHORTNESS OF BREATH., Disp: 54 g, Rfl: 3    Cholecalciferol (VITAMIN D) 125 MCG (5000 UT) Cap, Take 1 Capsule by mouth every day., Disp: 90 Capsule, Rfl: 2    albuterol (PROVENTIL) 2.5mg/3ml Nebu Soln solution for nebulization, Take 3 mL by nebulization every four hours as needed for Shortness of Breath., Disp: 120 Each, Rfl: 2    orlistat (BETITO) 60 MG capsule, Take 1 Capsule by mouth 3 times a day with meals. (Patient not taking: Reported on 8/14/2024), Disp: 90 Capsule, Rfl: 3    Varenicline Tartrate, Starter, (CHANTIX STARTING MONTH PACO) 0.5 MG X 11 & 1 MG X 42 Tablet Therapy Pack, Take as directed (Patient not taking: Reported on 8/14/2024), Disp: 1 Each, Rfl: 1    doxycycline (VIBRAMYCIN) 100 MG Tab, Take 1 Tablet by mouth 2 times a day. (Patient not taking: Reported on 4/30/2024), Disp: 20 Tablet, Rfl: 0    moxifloxacin (VIGAMOX) 0.5 % Solution, Administer 1 Drop into the right eye 4 times a day. (Patient not taking: Reported on 8/14/2024), Disp: , Rfl:     EPINEPHrine (EPIPEN) 0.3 MG/0.3ML Solution Auto-injector solution for injection, Inject 0.3 mL into the thigh one time for 1  "dose (Patient not taking: Reported on 4/30/2024), Disp: 1 Each, Rfl: 5    Allergies, past medical history, past surgical history, family history, social history reviewed and updated    Objective:     Vitals: /64   Pulse (!) 117   Temp 36.8 °C (98.3 °F)   Resp 18   Ht 1.88 m (6' 2\")   Wt 122 kg (268 lb 15.4 oz)   SpO2 95%   BMI 34.53 kg/m²   General: Alert, pleasant, NAD  EYES:   PERRL, EOMI, no icterus or pallor.  Conjunctivae and lids normal.   HENT:  Normocephalic.  External ears normal.  Neck supple.   Respiratory: Normal respiratory effort.  Wheezing right lower base but improved from previous visit.  Abdomen: obese  Skin: Warm, dry, no rashes.  Musculoskeletal: Gait is normal.  Moves all extremities well.    Extremities: normal range of motion all extremities.   Neurological: No tremors, sensation grossly intact, CN2-12 intact.  Psych:  Affect/mood is normal, judgement is good, memory is intact, grooming is appropriate.      Assessment/Plan:     Dayton was seen today for follow-up.    Diagnoses and all orders for this visit:    Chronic obstructive pulmonary disease, unspecified COPD type (HCC)  -     fluticasone-umeclidinium-vilanterol (TRELEGY ELLIPTA) 200-62.5-25 mcg/act inhaler; Inhale 1 Puff every day.    Moderate persistent asthma with acute exacerbation    Current smoker  -     varenicline (CHANTIX CONTINUING MONTH PACO) 1 MG tablet; Take 1 Tablet by mouth 2 times a day.    Weight gain    Obesity (BMI 30-39.9)    Urinary frequency        Assessment & Plan  1. Chronic Obstructive Pulmonary Disease (COPD)/moderate persistent asthma/current smoker.  He reports that Breztri is not working as well as Trelegy. He was previously on Trelegy and found it more effective. Trelegy will be restarted, and Breztri will be discontinued. A prescription for Trelegy 200 mcg will be sent for a 90-day supply. He has not smoked for the last week and is working on smoking cessation. He will continue using Chantix as " part of his smoking cessation plan. He accidentally took double doses of Chantix for a month but has since corrected this and feels better.     2. Weight Gain/Obesity.  He is anxious about his weight gain, which can be a side effect of smoking cessation. A referral to a specialist for weight management will be submitted.    3. Urinary Frequency.  Urinary frequency was noted when he accidentally doubled his Chantix dose. Since reducing the dose, his urinary frequency has improved. Labs to check renal function were recommended but declined. If symptoms return, blood work will be recommended.    Follow-up  The patient will follow up in 6 weeks.    Return in about 6 weeks (around 9/25/2024), or if symptoms worsen or fail to improve, for copd.    Please note that this dictation was created using voice recognition software. I have made every reasonable attempt to correct obvious errors, but expect that there are errors of grammar and possible content that I did not discover before finalizing note.

## 2024-09-25 ENCOUNTER — OFFICE VISIT (OUTPATIENT)
Dept: MEDICAL GROUP | Facility: CLINIC | Age: 54
End: 2024-09-25
Payer: COMMERCIAL

## 2024-09-25 VITALS
OXYGEN SATURATION: 95 % | HEIGHT: 74 IN | TEMPERATURE: 98.5 F | DIASTOLIC BLOOD PRESSURE: 64 MMHG | BODY MASS INDEX: 35 KG/M2 | SYSTOLIC BLOOD PRESSURE: 118 MMHG | WEIGHT: 272.71 LBS | RESPIRATION RATE: 16 BRPM | HEART RATE: 91 BPM

## 2024-09-25 DIAGNOSIS — E66.9 OBESITY (BMI 30-39.9): ICD-10-CM

## 2024-09-25 DIAGNOSIS — J44.9 CHRONIC OBSTRUCTIVE PULMONARY DISEASE, UNSPECIFIED COPD TYPE (HCC): ICD-10-CM

## 2024-09-25 DIAGNOSIS — F17.200 CURRENT SMOKER: ICD-10-CM

## 2024-09-25 PROCEDURE — 99213 OFFICE O/P EST LOW 20 MIN: CPT | Performed by: PHYSICIAN ASSISTANT

## 2024-09-25 PROCEDURE — 3074F SYST BP LT 130 MM HG: CPT | Performed by: PHYSICIAN ASSISTANT

## 2024-09-25 PROCEDURE — 3078F DIAST BP <80 MM HG: CPT | Performed by: PHYSICIAN ASSISTANT

## 2024-09-25 RX ORDER — VARENICLINE TARTRATE 1 MG/1
1 TABLET, FILM COATED ORAL 2 TIMES DAILY
Qty: 60 TABLET | Refills: 1 | Status: SHIPPED | OUTPATIENT
Start: 2024-09-25

## 2024-09-25 ASSESSMENT — FIBROSIS 4 INDEX: FIB4 SCORE: 0.86

## 2024-09-25 NOTE — PROGRESS NOTES
cc:  copd    Subjective:     Dayton Hassan is a 54 y.o. male presenting for copd        History of Present Illness  The patient is a 54-year-old male who presents to the office today to follow up on COPD.    He reports an improvement in his breathing, being able to take deeper breaths. His use of the nebulizer machine has decreased, indicating less need for medication. He has switched from Breztri to Trelegy, which he finds more effective as he can feel the powder and notice a difference. He uses Trelegy once a day compared to twice a day with Breztri. He has not used his inhaler today, only taking one puff this morning before leaving for work.    He has not completely quit smoking but is making progress. He did not receive the Chantix prescription that was sent to him.    He has been gaining weight and is trying to manage it by consuming sugar-free shakes.    SOCIAL HISTORY  He smokes cigarettes.       Review of systems:  See above.   Denies any symptoms unless previously indicated.        Current Outpatient Medications:     varenicline (CHANTIX CONTINUING MONTH PACO) 1 MG tablet, Take 1 Tablet by mouth 2 times a day., Disp: 60 Tablet, Rfl: 1    fluticasone-umeclidinium-vilanterol (TRELEGY ELLIPTA) 200-62.5-25 mcg/act inhaler, Inhale 1 Puff every day., Disp: 3 Each, Rfl: 2    ibuprofen (MOTRIN) 800 MG Tab, TAKE 1 TABLET BY MOUTH EVERY 8 HOURS WITH FOOD AS NEEDED FOR HEADACHE OR PAIN, Disp: 60 Tablet, Rfl: 5    orlistat (BETITO) 60 MG capsule, Take 1 Capsule by mouth 3 times a day with meals., Disp: 90 Capsule, Rfl: 3    ipratropium-albuterol (DUONEB) 0.5-2.5 (3) MG/3ML nebulizer solution, Take 3 mL by nebulization 4 times a day., Disp: 300 mL, Rfl: 3    Varenicline Tartrate, Starter, (CHANTIX STARTING MONTH PACO) 0.5 MG X 11 & 1 MG X 42 Tablet Therapy Pack, Take as directed, Disp: 1 Each, Rfl: 1    albuterol 108 (90 Base) MCG/ACT Aero Soln inhalation aerosol, 2 PUFFS EVERY 4 HOURS ONLY IF NEEDED FOR COUGH, WHEEZING,  "OR SHORTNESS OF BREATH., Disp: 54 g, Rfl: 3    Cholecalciferol (VITAMIN D) 125 MCG (5000 UT) Cap, Take 1 Capsule by mouth every day., Disp: 90 Capsule, Rfl: 2    EPINEPHrine (EPIPEN) 0.3 MG/0.3ML Solution Auto-injector solution for injection, Inject 0.3 mL into the thigh one time for 1 dose, Disp: 1 Each, Rfl: 5    moxifloxacin (VIGAMOX) 0.5 % Solution, Administer 1 Drop into the right eye 4 times a day. (Patient not taking: Reported on 9/25/2024), Disp: , Rfl:     Allergies, past medical history, past surgical history, family history, social history reviewed and updated    Objective:     Vitals: /64 (BP Location: Left arm, Patient Position: Sitting, BP Cuff Size: Adult)   Pulse 91   Temp 36.9 °C (98.5 °F) (Temporal)   Resp 16   Ht 1.88 m (6' 2\")   Wt 124 kg (272 lb 11.3 oz)   SpO2 95%   BMI 35.01 kg/m²   General: Alert, pleasant, NAD  EYES:   PERRL, EOMI, no icterus or pallor.  Conjunctivae and lids normal.   HENT:  Normocephalic.  External ears normal. Neck supple.    Respiratory: Normal respiratory effort.   Abdomen: obese  Skin: Warm, dry, no rashes.  Musculoskeletal: Gait is normal.  Moves all extremities well.    Extremities: normal range of motion all extremities.   Neurological: No tremors, sensation grossly intact,  CN2-12 intact.  Psych:  Affect/mood is normal, judgement is good, memory is intact, grooming is appropriate.      Assessment/Plan:     Dayton was seen today for copd.    Diagnoses and all orders for this visit:    Chronic obstructive pulmonary disease, unspecified COPD type (HCC)    Current smoker  -     varenicline (CHANTIX CONTINUING MONTH PACO) 1 MG tablet; Take 1 Tablet by mouth 2 times a day.    Obesity (BMI 30-39.9)        Assessment & Plan  1. Chronic Obstructive Pulmonary Disease (COPD).  He reports improvement in breathing and reduced use of the nebulizer. He has switched from Breztri to Trelegy and finds it more effective. He is still smoking but is attempting to cut down, " especially at work. Chantix will be resubmitted to [facility] in [place] as he did not receive the previous prescription. If he does not hear from the pharmacy by tomorrow, he should notify the office.    2. Smoking Cessation.  He is making efforts to quit smoking and has been prescribed Chantix. He is advised to continue using Chantix and to notify the office if there are any issues with the prescription.    3. Obesity.  He has not yet started orlistat but plans to do so when ready. He is advised to begin the medication when he feels prepared.    Follow-up  Return in 8 weeks for follow up.    Return in about 8 weeks (around 11/20/2024), or if symptoms worsen or fail to improve.    Please note that this dictation was created using voice recognition software. I have made every reasonable attempt to correct obvious errors, but expect that there are errors of grammar and possible content that I did not discover before finalizing note.

## 2024-10-17 ENCOUNTER — TELEPHONE (OUTPATIENT)
Dept: HEALTH INFORMATION MANAGEMENT | Facility: OTHER | Age: 54
End: 2024-10-17
Payer: COMMERCIAL

## 2024-10-31 DIAGNOSIS — J44.9 CHRONIC OBSTRUCTIVE PULMONARY DISEASE, UNSPECIFIED COPD TYPE (HCC): ICD-10-CM

## 2024-11-01 DIAGNOSIS — J44.1 COPD WITH ACUTE EXACERBATION (HCC): ICD-10-CM

## 2024-11-01 NOTE — TELEPHONE ENCOUNTER
Received request via: Patient    Was the patient seen in the last year in this department? Yes    Does the patient have an active prescription (recently filled or refills available) for medication(s) requested?  Yes    Pharmacy Name: [facility] in [place]    Does the patient have senior living Plus and need 100-day supply? (This applies to ALL medications) Patient does not have SCP

## 2024-11-01 NOTE — TELEPHONE ENCOUNTER
Received request via: Patient    Was the patient seen in the last year in this department? Yes    Does the patient have an active prescription (recently filled or refills available) for medication(s) requested?  Yes    Pharmacy Name: [facility] in [place]    Does the patient have snf Plus and need 100-day supply? (This applies to ALL medications) Patient does not have SCP

## 2024-11-04 RX ORDER — ALBUTEROL SULFATE 90 UG/1
INHALANT RESPIRATORY (INHALATION)
Qty: 54 G | Refills: 3 | Status: SHIPPED | OUTPATIENT
Start: 2024-11-04

## 2024-11-05 RX ORDER — IPRATROPIUM BROMIDE AND ALBUTEROL SULFATE 2.5; .5 MG/3ML; MG/3ML
SOLUTION RESPIRATORY (INHALATION)
Qty: 270 ML | Refills: 1 | Status: SHIPPED | OUTPATIENT
Start: 2024-11-05

## 2024-11-21 ENCOUNTER — OFFICE VISIT (OUTPATIENT)
Dept: MEDICAL GROUP | Facility: CLINIC | Age: 54
End: 2024-11-21
Payer: COMMERCIAL

## 2024-11-21 VITALS
WEIGHT: 275.57 LBS | SYSTOLIC BLOOD PRESSURE: 106 MMHG | DIASTOLIC BLOOD PRESSURE: 68 MMHG | RESPIRATION RATE: 18 BRPM | BODY MASS INDEX: 35.37 KG/M2 | TEMPERATURE: 97.8 F | HEIGHT: 74 IN | OXYGEN SATURATION: 96 % | HEART RATE: 101 BPM

## 2024-11-21 DIAGNOSIS — J44.9 CHRONIC OBSTRUCTIVE PULMONARY DISEASE, UNSPECIFIED COPD TYPE (HCC): ICD-10-CM

## 2024-11-21 DIAGNOSIS — J32.4 PANSINUSITIS, UNSPECIFIED CHRONICITY: ICD-10-CM

## 2024-11-21 DIAGNOSIS — F17.200 CURRENT SMOKER: ICD-10-CM

## 2024-11-21 PROCEDURE — 3078F DIAST BP <80 MM HG: CPT | Performed by: PHYSICIAN ASSISTANT

## 2024-11-21 PROCEDURE — 99213 OFFICE O/P EST LOW 20 MIN: CPT | Performed by: PHYSICIAN ASSISTANT

## 2024-11-21 PROCEDURE — 3074F SYST BP LT 130 MM HG: CPT | Performed by: PHYSICIAN ASSISTANT

## 2024-11-21 RX ORDER — VARENICLINE TARTRATE 1 MG/1
1 TABLET, FILM COATED ORAL 2 TIMES DAILY
Qty: 60 TABLET | Refills: 0 | Status: SHIPPED | OUTPATIENT
Start: 2024-11-21

## 2024-11-21 RX ORDER — AMOXICILLIN 500 MG/1
500 CAPSULE ORAL 3 TIMES DAILY
Qty: 30 CAPSULE | Refills: 0 | Status: SHIPPED | OUTPATIENT
Start: 2024-11-21 | End: 2024-12-01

## 2024-11-21 ASSESSMENT — FIBROSIS 4 INDEX: FIB4 SCORE: 0.86

## 2024-11-21 NOTE — PROGRESS NOTES
cc:  copd    Subjective:     Dayton Hassan is a 54 y.o. male presenting for copd        History of Present Illness  The patient is a 54-year-old male who presents to the office today for COPD and smoking cessation.    He is currently making efforts to quit smoking, with a goal to completely stop by the end of December 2024. His current consumption is approximately 4 to 5 cigarettes daily, which is a significant reduction from his previous habit of 2 packs per day. He reports feeling irritable when he is not smoking. He is unable to use nicotine patches due to excessive chewing.    He has recently refilled his Trelegy prescription.    He has been experiencing sinus issues for the past few days, which he attributes to weather changes. Despite using a pump, he has not found much relief.    SOCIAL HISTORY  He does not drink alcohol.       Review of systems:  See above.   Denies any symptoms unless previously indicated.        Current Outpatient Medications:     varenicline (CHANTIX CONTINUING MONTH PACO) 1 MG tablet, Take 1 Tablet by mouth 2 times a day., Disp: 60 Tablet, Rfl: 0    amoxicillin (AMOXIL) 500 MG Cap, Take 1 Capsule by mouth 3 times a day for 10 days., Disp: 30 Capsule, Rfl: 0    ipratropium-albuterol (DUONEB) 0.5-2.5 (3) MG/3ML nebulizer solution, USE 3 ML VIA NEBULIZER FOUR TIMES DAILY, Disp: 270 mL, Rfl: 1    albuterol 108 (90 Base) MCG/ACT Aero Soln inhalation aerosol, INHALE 2 PUFFS BY MOUTH EVERY 4 HOURS AS NEEDED FOR COUGH, WHEEZING, OR SHORTNESS OF BREATH, Disp: 54 g, Rfl: 3    fluticasone-umeclidinium-vilanterol (TRELEGY ELLIPTA) 200-62.5-25 mcg/act inhaler, Inhale 1 Puff every day., Disp: 3 Each, Rfl: 2    ibuprofen (MOTRIN) 800 MG Tab, TAKE 1 TABLET BY MOUTH EVERY 8 HOURS WITH FOOD AS NEEDED FOR HEADACHE OR PAIN, Disp: 60 Tablet, Rfl: 5    orlistat (BETITO) 60 MG capsule, Take 1 Capsule by mouth 3 times a day with meals., Disp: 90 Capsule, Rfl: 3    Varenicline Tartrate, Starter, (CHANTIX  "STARTING MONTH PAK) 0.5 MG X 11 & 1 MG X 42 Tablet Therapy Pack, Take as directed, Disp: 1 Each, Rfl: 1    Cholecalciferol (VITAMIN D) 125 MCG (5000 UT) Cap, Take 1 Capsule by mouth every day., Disp: 90 Capsule, Rfl: 2    EPINEPHrine (EPIPEN) 0.3 MG/0.3ML Solution Auto-injector solution for injection, Inject 0.3 mL into the thigh one time for 1 dose, Disp: 1 Each, Rfl: 5    moxifloxacin (VIGAMOX) 0.5 % Solution, Administer 1 Drop into the right eye 4 times a day. (Patient not taking: Reported on 11/21/2024), Disp: , Rfl:     Allergies, past medical history, past surgical history, family history, social history reviewed and updated    Objective:     Vitals: /68 (BP Location: Left arm, Patient Position: Sitting, BP Cuff Size: Large adult)   Pulse (!) 101   Temp 36.6 °C (97.8 °F) (Temporal)   Resp 18   Ht 1.88 m (6' 2\")   Wt 125 kg (275 lb 9.2 oz)   SpO2 96%   BMI 35.38 kg/m²   General: Alert, pleasant, NAD  EYES:   PERRL, EOMI, no icterus or pallor.  Conjunctivae and lids normal.   HENT:  Normocephalic.  External ears normal. Neck supple.     Heart: Regular rate and rhythm.  S1 and S2 normal.  No murmurs appreciated.  Respiratory: Normal respiratory effort.  Clear to auscultation bilaterally.improved lung sounds all lung fields  Abdomen: obese  Skin: Warm, dry, no rashes.  Musculoskeletal: Gait is normal.  Moves all extremities well.    Extremities: normal range of motion all extremities.   Neurological: No tremors, sensation grossly intact, CN2-12 intact.  Psych:  Affect/mood is normal, judgement is good, memory is intact, grooming is appropriate.    Physical Exam  Lungs sound normal.  Heart sounds normal.         Assessment/Plan:     Dayton was seen today for copd and nicotine dependence.    Diagnoses and all orders for this visit:    Chronic obstructive pulmonary disease, unspecified COPD type (HCC)    Current smoker  -     varenicline (CHANTIX CONTINUING MONTH PAK) 1 MG tablet; Take 1 Tablet by mouth 2 " times a day.    Pansinusitis, unspecified chronicity  -     amoxicillin (AMOXIL) 500 MG Cap; Take 1 Capsule by mouth 3 times a day for 10 days.        Assessment & Plan  1. Chronic Obstructive Pulmonary Disease (COPD).  He reports that his lungs feel congested and that his sinuses have been bothering him for the last couple of days. Despite this, his lungs sound clear upon examination. He has been using Trelegy and recently received a refill. A 30-day refill of Chantix was provided to assist with smoking cessation. An antibiotic prescription was also given, with instructions to fill it only if symptoms do not improve within the next 2 days.    2. Smoking Cessation.  He is currently smoking 4-5 cigarettes a day, down from a quarter pack. He is aiming to quit by late December. He was advised against increasing the dosage of Chantix due to potential kidney concerns. He was encouraged to continue his efforts to quit smoking.    3.  Sinusitis  Will provide amoxicillin.  If no improvement in the next several days, patient to start amoxicillin.      Follow-up  Return in 3 months for follow up.    No follow-ups on file.    Please note that this dictation was created using voice recognition software. I have made every reasonable attempt to correct obvious errors, but expect that there are errors of grammar and possible content that I did not discover before finalizing note.

## 2025-02-20 ENCOUNTER — OFFICE VISIT (OUTPATIENT)
Dept: MEDICAL GROUP | Facility: CLINIC | Age: 55
End: 2025-02-20
Payer: COMMERCIAL

## 2025-02-20 VITALS
DIASTOLIC BLOOD PRESSURE: 62 MMHG | BODY MASS INDEX: 34.4 KG/M2 | SYSTOLIC BLOOD PRESSURE: 120 MMHG | WEIGHT: 268.08 LBS | RESPIRATION RATE: 18 BRPM | TEMPERATURE: 97.7 F | HEART RATE: 91 BPM | HEIGHT: 74 IN | OXYGEN SATURATION: 95 %

## 2025-02-20 DIAGNOSIS — R06.02 SHORTNESS OF BREATH: ICD-10-CM

## 2025-02-20 DIAGNOSIS — J45.50 SEVERE PERSISTENT ASTHMA WITHOUT COMPLICATION: ICD-10-CM

## 2025-02-20 DIAGNOSIS — M72.0 DUPUYTREN'S CONTRACTURE OF RIGHT HAND: ICD-10-CM

## 2025-02-20 DIAGNOSIS — J44.9 CHRONIC OBSTRUCTIVE PULMONARY DISEASE, UNSPECIFIED COPD TYPE (HCC): ICD-10-CM

## 2025-02-20 DIAGNOSIS — F17.200 CURRENT SMOKER: ICD-10-CM

## 2025-02-20 DIAGNOSIS — J30.89 ENVIRONMENTAL AND SEASONAL ALLERGIES: ICD-10-CM

## 2025-02-20 PROCEDURE — 99214 OFFICE O/P EST MOD 30 MIN: CPT | Performed by: PHYSICIAN ASSISTANT

## 2025-02-20 PROCEDURE — 3074F SYST BP LT 130 MM HG: CPT | Performed by: PHYSICIAN ASSISTANT

## 2025-02-20 PROCEDURE — 3078F DIAST BP <80 MM HG: CPT | Performed by: PHYSICIAN ASSISTANT

## 2025-02-20 RX ORDER — MONTELUKAST SODIUM 10 MG/1
10 TABLET ORAL DAILY
Qty: 90 TABLET | Refills: 1 | Status: SHIPPED | OUTPATIENT
Start: 2025-02-20

## 2025-02-20 RX ORDER — FLUTICASONE FUROATE, UMECLIDINIUM BROMIDE AND VILANTEROL TRIFENATATE 200; 62.5; 25 UG/1; UG/1; UG/1
1 POWDER RESPIRATORY (INHALATION) DAILY
Qty: 3 EACH | Refills: 2 | Status: SHIPPED | OUTPATIENT
Start: 2025-02-20

## 2025-02-20 RX ORDER — VARENICLINE TARTRATE 0.5 (11)-1
KIT ORAL
Qty: 1 EACH | Refills: 1 | Status: SHIPPED | OUTPATIENT
Start: 2025-02-20

## 2025-02-20 RX ORDER — VARENICLINE TARTRATE 1 MG/1
1 TABLET, FILM COATED ORAL 2 TIMES DAILY
Qty: 60 TABLET | Refills: 0 | Status: SHIPPED | OUTPATIENT
Start: 2025-02-20

## 2025-02-20 ASSESSMENT — FIBROSIS 4 INDEX: FIB4 SCORE: 0.86
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
## 2025-07-25 DIAGNOSIS — J44.9 CHRONIC OBSTRUCTIVE PULMONARY DISEASE, UNSPECIFIED COPD TYPE (HCC): ICD-10-CM

## 2025-07-25 NOTE — TELEPHONE ENCOUNTER
Requested Prescriptions     Pending Prescriptions Disp Refills    fluticasone-umeclidinium-vilanterol (TRELEGY ELLIPTA) 200-62.5-25 mcg/PUFF inhaler 3 Each 2     Sig: Inhale 1 Puff every day.     Last office visit: 2/20/25  Last lab: 1/24/24

## 2025-07-28 RX ORDER — FLUTICASONE FUROATE, UMECLIDINIUM BROMIDE AND VILANTEROL TRIFENATATE 200; 62.5; 25 UG/1; UG/1; UG/1
1 POWDER RESPIRATORY (INHALATION) DAILY
Qty: 3 EACH | Refills: 1 | Status: SHIPPED | OUTPATIENT
Start: 2025-07-28

## 2025-08-16 DIAGNOSIS — J45.50 SEVERE PERSISTENT ASTHMA WITHOUT COMPLICATION: ICD-10-CM

## 2025-08-19 RX ORDER — MONTELUKAST SODIUM 10 MG/1
10 TABLET ORAL DAILY
Qty: 90 TABLET | Refills: 1 | Status: SHIPPED | OUTPATIENT
Start: 2025-08-19 | End: 2025-08-20 | Stop reason: SDUPTHER

## 2025-08-20 ENCOUNTER — OFFICE VISIT (OUTPATIENT)
Dept: MEDICAL GROUP | Facility: CLINIC | Age: 55
End: 2025-08-20
Payer: COMMERCIAL

## 2025-08-20 VITALS
BODY MASS INDEX: 34.94 KG/M2 | HEIGHT: 74 IN | TEMPERATURE: 97.9 F | HEART RATE: 103 BPM | OXYGEN SATURATION: 96 % | SYSTOLIC BLOOD PRESSURE: 110 MMHG | RESPIRATION RATE: 20 BRPM | DIASTOLIC BLOOD PRESSURE: 66 MMHG | WEIGHT: 272.27 LBS

## 2025-08-20 DIAGNOSIS — M25.562 CHRONIC PAIN OF BOTH KNEES: Primary | ICD-10-CM

## 2025-08-20 DIAGNOSIS — M54.42 CHRONIC BILATERAL LOW BACK PAIN WITH BILATERAL SCIATICA: ICD-10-CM

## 2025-08-20 DIAGNOSIS — J45.50 SEVERE PERSISTENT ASTHMA WITHOUT COMPLICATION: ICD-10-CM

## 2025-08-20 DIAGNOSIS — M79.672 PAIN OF LEFT HEEL: ICD-10-CM

## 2025-08-20 DIAGNOSIS — M54.41 CHRONIC BILATERAL LOW BACK PAIN WITH BILATERAL SCIATICA: ICD-10-CM

## 2025-08-20 DIAGNOSIS — G89.29 CHRONIC BILATERAL LOW BACK PAIN WITH BILATERAL SCIATICA: ICD-10-CM

## 2025-08-20 DIAGNOSIS — J44.1 COPD WITH ACUTE EXACERBATION (HCC): ICD-10-CM

## 2025-08-20 DIAGNOSIS — E66.9 OBESITY (BMI 30-39.9): ICD-10-CM

## 2025-08-20 DIAGNOSIS — M25.561 CHRONIC PAIN OF BOTH KNEES: Primary | ICD-10-CM

## 2025-08-20 DIAGNOSIS — M65.30 TRIGGER FINGER, UNSPECIFIED FINGER, UNSPECIFIED LATERALITY: ICD-10-CM

## 2025-08-20 DIAGNOSIS — J44.9 CHRONIC OBSTRUCTIVE PULMONARY DISEASE, UNSPECIFIED COPD TYPE (HCC): ICD-10-CM

## 2025-08-20 DIAGNOSIS — G89.29 CHRONIC PAIN OF BOTH KNEES: Primary | ICD-10-CM

## 2025-08-20 PROCEDURE — 99214 OFFICE O/P EST MOD 30 MIN: CPT | Performed by: PHYSICIAN ASSISTANT

## 2025-08-20 PROCEDURE — 3078F DIAST BP <80 MM HG: CPT | Performed by: PHYSICIAN ASSISTANT

## 2025-08-20 PROCEDURE — 3074F SYST BP LT 130 MM HG: CPT | Performed by: PHYSICIAN ASSISTANT

## 2025-08-20 RX ORDER — ALBUTEROL SULFATE 90 UG/1
INHALANT RESPIRATORY (INHALATION)
Qty: 54 G | Refills: 3 | Status: SHIPPED | OUTPATIENT
Start: 2025-08-20

## 2025-08-20 RX ORDER — PREDNISONE 20 MG/1
20 TABLET ORAL DAILY
Qty: 5 TABLET | Refills: 0 | Status: SHIPPED | OUTPATIENT
Start: 2025-08-20

## 2025-08-20 RX ORDER — MONTELUKAST SODIUM 10 MG/1
10 TABLET ORAL DAILY
Qty: 90 TABLET | Refills: 1 | Status: SHIPPED | OUTPATIENT
Start: 2025-08-20

## 2025-08-20 RX ORDER — IPRATROPIUM BROMIDE AND ALBUTEROL SULFATE 2.5; .5 MG/3ML; MG/3ML
3 SOLUTION RESPIRATORY (INHALATION) EVERY 4 HOURS PRN
Qty: 270 ML | Refills: 3 | Status: SHIPPED | OUTPATIENT
Start: 2025-08-20

## 2025-08-20 RX ORDER — FLUTICASONE FUROATE, UMECLIDINIUM BROMIDE AND VILANTEROL TRIFENATATE 200; 62.5; 25 UG/1; UG/1; UG/1
1 POWDER RESPIRATORY (INHALATION) DAILY
Qty: 3 EACH | Refills: 1 | Status: SHIPPED | OUTPATIENT
Start: 2025-08-20

## 2025-08-20 ASSESSMENT — FIBROSIS 4 INDEX: FIB4 SCORE: 0.87
